# Patient Record
Sex: FEMALE | Race: WHITE | NOT HISPANIC OR LATINO | Employment: OTHER | URBAN - METROPOLITAN AREA
[De-identification: names, ages, dates, MRNs, and addresses within clinical notes are randomized per-mention and may not be internally consistent; named-entity substitution may affect disease eponyms.]

---

## 2017-04-04 ENCOUNTER — OFFICE VISIT (OUTPATIENT)
Dept: FAMILY MEDICINE | Facility: CLINIC | Age: 72
End: 2017-04-04
Payer: COMMERCIAL

## 2017-04-04 ENCOUNTER — RADIANT APPOINTMENT (OUTPATIENT)
Dept: GENERAL RADIOLOGY | Facility: CLINIC | Age: 72
End: 2017-04-04
Attending: PHYSICIAN ASSISTANT
Payer: COMMERCIAL

## 2017-04-04 VITALS
BODY MASS INDEX: 20.36 KG/M2 | SYSTOLIC BLOOD PRESSURE: 98 MMHG | HEART RATE: 54 BPM | DIASTOLIC BLOOD PRESSURE: 66 MMHG | HEIGHT: 66 IN | TEMPERATURE: 97.5 F | WEIGHT: 126.7 LBS | OXYGEN SATURATION: 98 %

## 2017-04-04 DIAGNOSIS — S99.921A INJURY OF TOE, RIGHT, INITIAL ENCOUNTER: Primary | ICD-10-CM

## 2017-04-04 DIAGNOSIS — S99.921A INJURY OF TOE, RIGHT, INITIAL ENCOUNTER: ICD-10-CM

## 2017-04-04 DIAGNOSIS — S92.514A CLOSED NONDISPLACED FRACTURE OF PROXIMAL PHALANX OF LESSER TOE OF RIGHT FOOT, INITIAL ENCOUNTER: ICD-10-CM

## 2017-04-04 PROCEDURE — 99213 OFFICE O/P EST LOW 20 MIN: CPT | Performed by: PHYSICIAN ASSISTANT

## 2017-04-04 PROCEDURE — 73660 X-RAY EXAM OF TOE(S): CPT | Mod: RT

## 2017-04-04 ASSESSMENT — ANXIETY QUESTIONNAIRES
3. WORRYING TOO MUCH ABOUT DIFFERENT THINGS: NOT AT ALL
5. BEING SO RESTLESS THAT IT IS HARD TO SIT STILL: NOT AT ALL
7. FEELING AFRAID AS IF SOMETHING AWFUL MIGHT HAPPEN: NOT AT ALL
GAD7 TOTAL SCORE: 0
2. NOT BEING ABLE TO STOP OR CONTROL WORRYING: NOT AT ALL
6. BECOMING EASILY ANNOYED OR IRRITABLE: NOT AT ALL
IF YOU CHECKED OFF ANY PROBLEMS ON THIS QUESTIONNAIRE, HOW DIFFICULT HAVE THESE PROBLEMS MADE IT FOR YOU TO DO YOUR WORK, TAKE CARE OF THINGS AT HOME, OR GET ALONG WITH OTHER PEOPLE: NOT DIFFICULT AT ALL
1. FEELING NERVOUS, ANXIOUS, OR ON EDGE: NOT AT ALL

## 2017-04-04 ASSESSMENT — PATIENT HEALTH QUESTIONNAIRE - PHQ9: 5. POOR APPETITE OR OVEREATING: NOT AT ALL

## 2017-04-04 NOTE — MR AVS SNAPSHOT
After Visit Summary   4/4/2017    Shaina Calixto    MRN: [id number]           Patient Information     Date Of Birth          1945        Visit Information        Provider Department      4/4/2017 9:00 AM Sanjeev Olvera PA-C [facility]        Today's Diagnoses     Injury of toe, right, initial encounter    -  1    Closed nondisplaced fracture of proximal phalanx of lesser toe of right foot, initial encounter          Care Instructions    You have already gotten [id number] vaccine. You should get the ftlclir31 vaccine. Both a for pneumonia protection.        Follow-ups after your visit        Additional Services     ORTHO  REFERRAL       Coverage of these services is subject to the terms and limitations of your health insurance plan. Please call member services at your health plan with any benefit or coverage questions.       [facility] is referring you to the Orthopedic  Services at [facility].       The  representative will assist you in the coordination of your orthopedic and musculoskeletal care as prescribed by your physician.    The  representative will call you within 24 hours or   You may contact the  representative at:   [phone] for [place] and [facility]  [phone] for [facility], and [facility]  [phone] for [facility]    Type of Referral : [facility]       Timeframe requested: Routine       If X-rays, CT or MRI's   have been performed, please contact the facility where they were done, to arrange for  prior to your scheduled appointment. Please bring this referral request to your appointment and present it to your specialist.                  Your next 10 appointments already scheduled     Apr 18, 2017  1:15 PM CDT   Return Visit with Octavia Marroquin MD   [facility]  "   [facility]  [address]  [facility] [address] [phone]              Who to contact     If you have questions or need follow up information about today's clinic visit or your schedule please contact [facility] [place] directly at [phone].  Normal or non-critical lab and imaging results will be communicated to you by MyChart, letter or phone within 4 business days after the clinic has received the results. If you do not hear from us within 7 days, please contact the clinic through MyChart or phone. If you have a critical or abnormal lab result, we will notify you by phone as soon as possible.  Submit refill requests through [employer] or call your pharmacy and they will forward the refill request to us. Please allow 3 business days for your refill to be completed.          Additional Information About Your Visit        MyChart Information     [employer] gives you secure access to your electronic health record. If you see a primary care provider, you can also send messages to your care team and make appointments. If you have questions, please call your primary care clinic.  If you do not have a primary care provider, please call [phone] and they will assist you.        Care EveryWhere ID     This is your Care EveryWhere ID. This could be used by other organizations to access your [place] medical records  [id number]        Your Vitals Were     Pulse Temperature Height Pulse Oximetry BMI (Body Mass Index)       54 97.5  F (36.4  C) (Oral) 5' 5.5\" (1.664 m) 98% 20.76 kg/m2        Blood Pressure from Last 3 Encounters:   04/04/17 98/66   12/15/15 131/75   11/30/15 130/82    Weight from Last 3 Encounters:   04/04/17 126 lb 11.2 oz (57.5 kg)   12/15/15 136 lb 11.2 oz (62 kg)   11/30/15 138 lb 1.6 oz (62.6 kg)              We Performed the Following     ORTHO  REFERRAL        Primary Care Provider Office Phone # Fax #    JULES Gillette Ra CNP " [phone]       [facility] [address]  [facility] [address]        Thank you!     Thank you for choosing [facility]  for your care. Our goal is always to provide you with excellent care. Hearing back from our patients is one way we can continue to improve our services. Please take a few minutes to complete the written survey that you may receive in the mail after your visit with us. Thank you!             Your Updated Medication List - Protect others around you: Learn how to safely use, store and throw away your medicines at www.disposemymeds.org.          This list is accurate as of: 4/4/17  9:52 AM.  Always use your most recent med list.                   Brand Name Dispense Instructions for use    HOMEOPATHIC PRODUCTS PO          MAGNESIUM PO          OMEGA 3 PO          vitamin B complex with vitamin C Tabs tablet      Take 1 tablet by mouth daily Reported on 4/4/2017       VITAMIN D3 PO      Take by mouth daily       WOMENS BONE HEALTH PO

## 2017-04-04 NOTE — PROGRESS NOTES
SUBJECTIVE:                                                    Shaina Calixto is a 71 year old female who presents to clinic today for the following health issues:    Musculoskeletal problem/pain      Duration: 1 week ago    Description  Location: 4 and 5 toes on right foot    Intensity:  mild    Accompanying signs and symptoms: discoloration of foot, painfull, swelling    History  Previous similar problem: no   Previous evaluation:  none    Precipitating or alleviating factors:  Trauma or overuse: YES- hit chair with foot  Aggravating factors include: standing and walking    Therapies tried and outcome: ice and arnica     -Patient is a 70yo female who presents with a one week hx of right toe injury  -She was walking in the dark and stubbed her toe - chair seemed to go in between her 4th and 5th toes  -There was immediate pain and swelling  -later was able to ice and use arnica for the injury  -she was seen at a hospital in [place], no xrays taken, but did get an ace bandage and continued with her trip  -is walking with limp, favoring the foot    Problem list and histories reviewed & adjusted, as indicated.  Additional history: as documented    Patient Active Problem List   Diagnosis     Breast cancer screening     Mild major depression (H)     Yeast dermatitis     Stress     Urticaria     CARDIOVASCULAR SCREENING; LDL GOAL LESS THAN 160     High risk HPV infection     Past Surgical History:   Procedure Laterality Date     ARTHROSCOPY KNEE RT/LT Right 2004     FOOT SURGERY Right 1995    due to foot injury     FOOT SURGERY Right 2000    ayers's neuroma     LASER YAG CAPSULOTOMY Bilateral 12/29/15     PHACOEMULSIFICATION CLEAR CORNEA WITH STANDARD INTRAOCULAR LENS IMPLANT Right 10/1/14     PHACOEMULSIFICATION CLEAR CORNEA WITH STANDARD INTRAOCULAR LENS IMPLANT Left 10/08/14       Social History   Substance Use Topics     Smoking status: Never Smoker     Smokeless tobacco: Never Used     Alcohol use Yes      Comment:  "1 glass a month     Family History   Problem Relation Age of Onset     Endometrial Cancer Mother 73     only endometrial hyperplasia     Other - See Comments Mother      TIA     Parkinsonism Mother      C.A.D. Maternal Grandfather      CANCER Father      AML     Glaucoma No family hx of      DIABETES No family hx of      Macular Degeneration No family hx of            Reviewed and updated as needed this visit by clinical staff  Tobacco  Allergies  Med Hx  Surg Hx  Fam Hx  Soc Hx      Reviewed and updated as needed this visit by Provider         ROS:  Constitutional, HEENT, cardiovascular, pulmonary, gi and gu systems are negative, except as otherwise noted.    OBJECTIVE:                                                    BP 98/66  Pulse 54  Temp 97.5  F (36.4  C) (Oral)  Ht 5' 5.5\" (1.664 m)  Wt 126 lb 11.2 oz (57.5 kg)  SpO2 98%  BMI 20.76 kg/m2  Body mass index is 20.76 kg/(m^2).  GENERAL: healthy, alert and no distress  MS: mild swelling over the 5th digit of the right pinky toe with ecchymosis over the 4th and 5th toes. Flexion/extension intact. TTP over the mtp of 3,4 & 5. DP intact.     Diagnostic Test Results:  XRAY right foot: fracture of proximal phalanx 5th digit     ASSESSMENT/PLAN:                                                    1. Injury of toe, right, initial encounter  2. Closed nondisplaced fracture of proximal phalanx of lesser toe of right foot, initial encounter  Fracture to 5th digit, right foot noted on exam. She would like to discuss possible treatment/management with podiatry. She is very hands off regarding medical care so swelling will need to be controlled with ice and elevation. Referral placed.   - XR Toe Right G/E 2 Views; Future  - ORTHO  REFERRAL    JENNIFER Bazan-C  [facility]    "

## 2017-04-04 NOTE — NURSING NOTE
"Chief Complaint   Patient presents with     Toe Injury       Initial BP 98/66  Pulse 54  Temp 97.5  F (36.4  C) (Oral)  Ht 5' 5.5\" (1.664 m)  Wt 126 lb 11.2 oz (57.5 kg)  SpO2 98%  BMI 20.76 kg/m2 Estimated body mass index is 20.76 kg/(m^2) as calculated from the following:    Height as of this encounter: 5' 5.5\" (1.664 m).    Weight as of this encounter: 126 lb 11.2 oz (57.5 kg).  Medication Reconciliation: complete   Alexsander Lynne CMA        "

## 2017-04-04 NOTE — PATIENT INSTRUCTIONS
You have already gotten [id number] vaccine. You should get the omeyied44 vaccine. Both a for pneumonia protection.

## 2017-04-05 ASSESSMENT — ANXIETY QUESTIONNAIRES: GAD7 TOTAL SCORE: 0

## 2017-04-05 ASSESSMENT — PATIENT HEALTH QUESTIONNAIRE - PHQ9: SUM OF ALL RESPONSES TO PHQ QUESTIONS 1-9: 0

## 2017-04-18 ENCOUNTER — OFFICE VISIT (OUTPATIENT)
Dept: OPHTHALMOLOGY | Facility: CLINIC | Age: 72
End: 2017-04-18

## 2017-04-18 DIAGNOSIS — H52.13 MYOPIC ASTIGMATISM OF BOTH EYES: ICD-10-CM

## 2017-04-18 DIAGNOSIS — Z96.1 PSEUDOPHAKIA, BOTH EYES: ICD-10-CM

## 2017-04-18 DIAGNOSIS — H52.203 MYOPIC ASTIGMATISM OF BOTH EYES: ICD-10-CM

## 2017-04-18 DIAGNOSIS — H52.4 PRESBYOPIA: ICD-10-CM

## 2017-04-18 DIAGNOSIS — H35.371 EPIRETINAL MEMBRANE, RIGHT EYE: Primary | ICD-10-CM

## 2017-04-18 ASSESSMENT — EXTERNAL EXAM - LEFT EYE: OS_EXAM: NORMAL

## 2017-04-18 ASSESSMENT — VISUAL ACUITY
METHOD: SNELLEN - LINEAR
OS_SC: 20/70-2
OD_SC: 20/20-2 SLOW

## 2017-04-18 ASSESSMENT — CUP TO DISC RATIO
OS_RATIO: 0.2
OD_RATIO: 0.2

## 2017-04-18 ASSESSMENT — EXTERNAL EXAM - RIGHT EYE: OD_EXAM: NORMAL

## 2017-04-18 ASSESSMENT — TONOMETRY
OS_IOP_MMHG: 10
IOP_METHOD: APPLANATION
OD_IOP_MMHG: 10

## 2017-04-18 ASSESSMENT — REFRACTION_MANIFEST
OD_SPHERE: -0.50
OS_SPHERE: -2.50
OS_CYLINDER: +1.50
OS_AXIS: 015
OD_CYLINDER: SPHERE

## 2017-04-18 ASSESSMENT — CONF VISUAL FIELD
OD_NORMAL: 1
OS_NORMAL: 1
METHOD: COUNTING FINGERS

## 2017-04-18 ASSESSMENT — SLIT LAMP EXAM - LIDS
COMMENTS: NORMAL
COMMENTS: NORMAL

## 2017-04-18 NOTE — MR AVS SNAPSHOT
After Visit Summary   4/18/2017    Shaina Calixto    MRN: [id number]           Patient Information     Date Of Birth          1945        Visit Information        Provider Department      4/18/2017 1:15 PM Octavia Marroquin MD [facility]         Follow-ups after your visit        Who to contact     Please call your clinic at [phone] to:    Ask questions about your health    Make or cancel appointments    Discuss your medicines    Learn about your test results    Speak to your doctor   If you have compliments or concerns about an experience at your clinic, or if you wish to file a complaint, please contact [facility] Patient Relations at [phone] or email us at [email or web address]         Additional Information About Your Visit        MyChart Information     [employer] gives you secure access to your electronic health record. If you see a primary care provider, you can also send messages to your care team and make appointments. If you have questions, please call your primary care clinic.  If you do not have a primary care provider, please call [phone] and they will assist you.      [employer] is an electronic gateway that provides easy, online access to your medical records. With [employer], you can request a clinic appointment, read your test results, renew a prescription or communicate with your care team.     To access your existing account, please contact your [facility] or call [phone] for assistance.        Care EveryWhere ID     This is your Care EveryWhere ID. This could be used by other organizations to access your [place] medical records  [id number]         Blood Pressure from Last 3 Encounters:   04/05/17 106/70   04/04/17 98/66   12/15/15 131/75    Weight from Last 3 Encounters:   04/05/17 57.2 kg (126 lb)   04/04/17 57.5 kg (126 lb 11.2 oz)   12/15/15 62 kg (136 lb 11.2 oz)               Today, you had the following     No orders found for display       Primary Care Provider Office Phone # Fax #    JULES Gillette Ra [facility] [phone]       [facility] [address]  [facility] [address]        Thank you!     Thank you for choosing [facility]  for your care. Our goal is always to provide you with excellent care. Hearing back from our patients is one way we can continue to improve our services. Please take a few minutes to complete the written survey that you may receive in the mail after your visit with us. Thank you!             Your Updated Medication List - Protect others around you: Learn how to safely use, store and throw away your medicines at www.disposemymeds.org.          This list is accurate as of: 4/18/17  2:21 PM.  Always use your most recent med list.                   Brand Name Dispense Instructions for use    HOMEOPATHIC PRODUCTS PO          MAGNESIUM PO          OMEGA 3 PO          vitamin B complex with vitamin C Tabs tablet      Take 1 tablet by mouth daily Reported on 4/4/2017       VITAMIN D3 PO      Take by mouth daily       WOMENS BONE HEALTH PO

## 2017-04-18 NOTE — PROGRESS NOTES
HPI  Shaina Calixto is a 71 year old female here for follow-up of ERM right eye. She continues to be unhappy with the result of her cataract surgery by Dr. Arthur about a year ago. She ended up with a monovision result and wanted both eyes for distance. She was also unhappy that the possibility of requiring a YAG capsulotomy was not discussed with her prior to the surgery. She thinks her vision may be getting a bit worse, but nothing drastic. No eye pain, redness, discharge. No flashes/floaters.      Assessment & Plan      (H35.371) Epiretinal membrane, right eye  (primary encounter diagnosis)  Comment: PVD with ERM. ERM stable and area of traction improved. VA good at 20/20.  Plan: Observe for now.     (Z96.1) Pseudophakia, both eyes  Comment: S/p YAG cap OU by Dr. Arthur.   Plan: Observe    (H52.203) Myopic astigmatism of both eyes  (H52.4) Presbyopia  Comment: Good vision with refraction. Monovision.  Plan: Discussed that options include wearing glasses for distance vision or she can consider laser vision correction to left eye. If she opts for laser vision correction, she would still need reading glasses for near. She will consider this, but is not eager to have another procedure.   -----------------------------------------------------------------------------------    Patient disposition:   Return in about 1 year (around 4/18/2018). or sooner as needed.    Teaching statement:  Complete documentation of historical and exam elements from today's encounter can be found in the full encounter summary report (not reduplicated in this progress note). I personally obtained the chief complaint(s) and history of present illness.  I confirmed and edited as necessary the review of systems, past medical/surgical history, family history, social history, and examination findings as documented by others; and I examined the patient myself. I personally reviewed the relevant tests, images, and reports as documented  above.     I formulated and edited as necessary the assessment and plan and discussed the findings and management plan with the patient and family.    Octavia Marroquin MD  Comprehensive Ophthalmology & Ocular Pathology  Department of Ophthalmology and Visual Neurosciences  [email or web address]  Pager [phone]

## 2017-05-15 ENCOUNTER — RADIANT APPOINTMENT (OUTPATIENT)
Dept: GENERAL RADIOLOGY | Facility: CLINIC | Age: 72
End: 2017-05-15
Attending: NURSE PRACTITIONER
Payer: COMMERCIAL

## 2017-05-15 ENCOUNTER — TELEPHONE (OUTPATIENT)
Dept: FAMILY MEDICINE | Facility: CLINIC | Age: 72
End: 2017-05-15

## 2017-05-15 DIAGNOSIS — S92.534D CLOSED NONDISPLACED FRACTURE OF DISTAL PHALANX OF LESSER TOE OF RIGHT FOOT WITH ROUTINE HEALING, SUBSEQUENT ENCOUNTER: ICD-10-CM

## 2017-05-15 DIAGNOSIS — S92.534D CLOSED NONDISPLACED FRACTURE OF DISTAL PHALANX OF LESSER TOE OF RIGHT FOOT WITH ROUTINE HEALING, SUBSEQUENT ENCOUNTER: Primary | ICD-10-CM

## 2017-05-15 PROCEDURE — 73660 X-RAY EXAM OF TOE(S): CPT | Mod: RT

## 2017-05-15 NOTE — TELEPHONE ENCOUNTER
Patient is calling to get a repeat x-ray of her toe?  She was seen by Fredrick 4/4/17, and he told her to stop back  In about 6 weeks to have a repeat x-ray to see how it is   Healing. (not mentioned in note) Is this ok to have this done?    Please advise.  She is doing fine, toe is rj taped to the toe next to it each day.    Livier Carlisle, KAILEE  Triage Nurse

## 2017-05-17 ENCOUNTER — MYC MEDICAL ADVICE (OUTPATIENT)
Dept: FAMILY MEDICINE | Facility: CLINIC | Age: 72
End: 2017-05-17

## 2017-05-18 NOTE — TELEPHONE ENCOUNTER
Sent my chart message to see if she would like to see podiatry.  Livier Carlisle, RN  Triage Nurse

## 2017-05-18 NOTE — TELEPHONE ENCOUNTER
I am checking with Dr. Pastrana.  Dr. Pastrana said she would be happy to see her for eval since not healing as anticipated, but I'm not sure if pt is interested.  Do you want to offer that first?  MEAGAN

## 2017-05-18 NOTE — TELEPHONE ENCOUNTER
Advisable not to tape toe at this point?   Continued fracture noted.  Please advise.   Livier Carlisle, RN  Triage Nurse

## 2017-05-19 ENCOUNTER — TELEPHONE (OUTPATIENT)
Dept: FAMILY MEDICINE | Facility: CLINIC | Age: 72
End: 2017-05-19

## 2017-05-19 NOTE — TELEPHONE ENCOUNTER
Please let pt know I talked with Dr. Pastrana and here is her advice--      NONI Mueller,     That is okay. If she is doing well, I would just have her progress to shoes in 3-4 weeks. If she continues to have a lot of pain after 4-6 weeks, then I would have her follow up to assess bone healing.      Thanks,  MEAGAN

## 2017-06-19 ENCOUNTER — TRANSFERRED RECORDS (OUTPATIENT)
Dept: HEALTH INFORMATION MANAGEMENT | Facility: CLINIC | Age: 72
End: 2017-06-19

## 2017-06-26 ENCOUNTER — OFFICE VISIT (OUTPATIENT)
Dept: FAMILY MEDICINE | Facility: CLINIC | Age: 72
End: 2017-06-26
Payer: COMMERCIAL

## 2017-06-26 VITALS
TEMPERATURE: 98.4 F | WEIGHT: 126 LBS | DIASTOLIC BLOOD PRESSURE: 76 MMHG | OXYGEN SATURATION: 100 % | SYSTOLIC BLOOD PRESSURE: 125 MMHG | HEART RATE: 51 BPM | RESPIRATION RATE: 18 BRPM | BODY MASS INDEX: 20.25 KG/M2 | HEIGHT: 66 IN

## 2017-06-26 DIAGNOSIS — M70.41 PREPATELLAR BURSITIS OF RIGHT KNEE: Primary | ICD-10-CM

## 2017-06-26 DIAGNOSIS — Z13.220 LIPID SCREENING: ICD-10-CM

## 2017-06-26 DIAGNOSIS — F32.0 MILD MAJOR DEPRESSION (H): ICD-10-CM

## 2017-06-26 DIAGNOSIS — S92.501G: ICD-10-CM

## 2017-06-26 PROCEDURE — 99213 OFFICE O/P EST LOW 20 MIN: CPT | Performed by: NURSE PRACTITIONER

## 2017-06-26 RX ORDER — CHOLECALCIFEROL (VITAMIN D3) 50 MCG
CAPSULE ORAL
COMMUNITY
Start: 2017-06-26 | End: 2018-05-22

## 2017-06-26 ASSESSMENT — ANXIETY QUESTIONNAIRES
6. BECOMING EASILY ANNOYED OR IRRITABLE: NOT AT ALL
IF YOU CHECKED OFF ANY PROBLEMS ON THIS QUESTIONNAIRE, HOW DIFFICULT HAVE THESE PROBLEMS MADE IT FOR YOU TO DO YOUR WORK, TAKE CARE OF THINGS AT HOME, OR GET ALONG WITH OTHER PEOPLE: NOT DIFFICULT AT ALL
7. FEELING AFRAID AS IF SOMETHING AWFUL MIGHT HAPPEN: NOT AT ALL
1. FEELING NERVOUS, ANXIOUS, OR ON EDGE: NOT AT ALL
5. BEING SO RESTLESS THAT IT IS HARD TO SIT STILL: NOT AT ALL
3. WORRYING TOO MUCH ABOUT DIFFERENT THINGS: NOT AT ALL
GAD7 TOTAL SCORE: 0
2. NOT BEING ABLE TO STOP OR CONTROL WORRYING: NOT AT ALL

## 2017-06-26 ASSESSMENT — PATIENT HEALTH QUESTIONNAIRE - PHQ9: 5. POOR APPETITE OR OVEREATING: NOT AT ALL

## 2017-06-26 NOTE — PATIENT INSTRUCTIONS
Stop back for a fasting cholesterol screening.    I ordered the xray to be done sometime within the next 3 months as long as you continue to heal.

## 2017-06-26 NOTE — MR AVS SNAPSHOT
After Visit Summary   6/26/2017    Shaina Calixto    MRN: [id number]           Patient Information     Date Of Birth          1945        Visit Information        Provider Department      6/26/2017 9:20 AM Ami Aguilar Ra, APRN CNP [facility]        Today's Diagnoses     Mild major depression (H)    -  1    Lipid screening        Closed fracture of phalanx of lesser toe of right foot with delayed healing, physeal involvement unspecified, unspecified phalanx, subsequent encounter          Care Instructions    Stop back for a fasting cholesterol screening.    I ordered the xray to be done sometime within the next 3 months as long as you continue to heal.          Follow-ups after your visit        Future tests that were ordered for you today     Open Future Orders        Priority Expected Expires Ordered    Lipid panel reflex to direct LDL Routine  6/26/2018 6/26/2017    **Comprehensive metabolic panel FUTURE 1yr Routine 5/27/2018 6/26/2018 6/26/2017    XR Toe Right G/E 2 Views Routine 6/26/2017 6/26/2018 6/26/2017            Who to contact     If you have questions or need follow up information about today's clinic visit or your schedule please contact [facility] directly at [phone].  Normal or non-critical lab and imaging results will be communicated to you by MyChart, letter or phone within 4 business days after the clinic has received the results. If you do not hear from us within 7 days, please contact the clinic through MyChart or phone. If you have a critical or abnormal lab result, we will notify you by phone as soon as possible.  Submit refill requests through [employer] or call your pharmacy and they will forward the refill request to us. Please allow 3 business days for your refill to be completed.          Additional Information About Your Visit        MyChart Information     [employer] gives you secure access to your electronic health record. If you  "see a primary care provider, you can also send messages to your care team and make appointments. If you have questions, please call your primary care clinic.  If you do not have a primary care provider, please call [phone] and they will assist you.        Care EveryWhere ID     This is your Care EveryWhere ID. This could be used by other organizations to access your [place] medical records  [id number]        Your Vitals Were     Pulse Temperature Respirations Height Pulse Oximetry Breastfeeding?    51 98.4  F (36.9  C) (Oral) 18 5' 5.5\" (1.664 m) 100% No    BMI (Body Mass Index)                   20.65 kg/m2            Blood Pressure from Last 3 Encounters:   06/26/17 125/76   04/05/17 106/70   04/04/17 98/66    Weight from Last 3 Encounters:   06/26/17 126 lb (57.2 kg)   04/05/17 126 lb (57.2 kg)   04/04/17 126 lb 11.2 oz (57.5 kg)              We Performed the Following     DEPRESSION ACTION PLAN (DAP)        Primary Care Provider Office Phone # Fax #    Ami JULES Tejeda [facility] [phone]       [facility] [place] [address] [address] [facility] [address]        Equal Access to Services     ARCADIO MANRIQUE : Hadii aad ku hadasho Soomaali, waaxda luqadaha, qaybta kaalmada adeegyada, waxay idiin hayaan adeeg kharaanalia la'aan . So [facility] [phone].    ATENCIÓN: Si habla español, tiene a lee disposición servicios gratuitos de asistencia lingüística. Llame al [phone].    We comply with applicable federal civil rights laws and [place] laws. We do not discriminate on the basis of race, color, national origin, age, disability sex, sexual orientation or gender identity.            Thank you!     Thank you for choosing [facility]  for your care. Our goal is always to provide you with excellent care. Hearing back from our patients is one way we can continue to improve our services. Please take a few minutes to complete the written survey that you may receive in the mail after your " visit with us. Thank you!             Your Updated Medication List - Protect others around you: Learn how to safely use, store and throw away your medicines at www.disposemymeds.org.          This list is accurate as of: 6/26/17 10:46 AM.  Always use your most recent med list.                   Brand Name Dispense Instructions for use Diagnosis    4X PROBIOTIC Tabs           calcium-vitamin D 600-400 MG-UNIT per tablet    CALTRATE     Take 1 tablet by mouth 2 times daily        glutamine 500 MG capsule      Take 1 capsule (500 mg) by mouth 4 times daily        HOMEOPATHIC PRODUCTS PO           MAGNESIUM PO           Molybdenum 30 MCG Caps     30 capsule    Take 45 mcg by mouth        OMEGA 3 PO           UNKNOWN MED DOSAGE      1 tablet Zinc 75        vitamin B complex with vitamin C Tabs tablet      Take 1 tablet by mouth daily Reported on 4/4/2017        VITAMIN D3 PO      Take by mouth daily        VITAMIN K PO      Take 1 tablet by mouth        WOMENS BONE HEALTH PO

## 2017-06-26 NOTE — LETTER
My Depression Action Plan  Name: Shaina Calixto   Date of Birth 1945  Date: 6/26/2017    My doctor: Ami Aguilar Ra   My clinic: [facility]  [id number] [place] [id number] [phone]          GREEN    ZONE   Good Control    What it looks like:     Things are going generally well. You have normal up s and down s. You may even feel depressed from time to time, but bad moods usually last less than a day.   What you need to do:  1. Continue to care for yourself (see self care plan)  2. Check your depression survival kit and update it as needed  3. Follow your physician s recommendations including any medication.  4. Do not stop taking medication unless you consult with your physician first.           YELLOW         ZONE Getting Worse    What it looks like:     Depression is starting to interfere with your life.     It may be hard to get out of bed; you may be starting to isolate yourself from others.    Symptoms of depression are starting to last most all day and this has happened for several days.     You may have suicidal thoughts but they are not constant.   What you need to do:     1. Call your care team, your response to treatment will improve if you keep your care team informed of your progress. Yellow periods are signs an adjustment may need to be made.     2. Continue your self-care, even if you have to fake it!    3. Talk to someone in your support network    4. Open up your depression survival kit           RED    ZONE Medical Alert - Get Help    What it looks like:     Depression is seriously interfering with your life.     You may experience these or other symptoms: You can t get out of bed most days, can t work or engage in other necessary activities, you have trouble taking care of basic hygiene, or basic responsibilities, thoughts of suicide or death that will not go away, self-injurious behavior.     What you need to do:  1. Call your care team  and request a same-day appointment. If they are not available (weekends or after hours) call your local crisis line, emergency room or 911.      Electronically signed by: Kat Stewart, June 26, 2017    Depression Self Care Plan / Survival Kit    Self-Care for Depression  Here s the deal. Your body and mind are really not as separate as most people think.  What you do and think affects how you feel and how you feel influences what you do and think. This means if you do things that people who feel good do, it will help you feel better.  Sometimes this is all it takes.  There is also a place for medication and therapy depending on how severe your depression is, so be sure to consult with your medical provider and/ or Behavioral Health Consultant if your symptoms are worsening or not improving.     In order to better manage my stress, I will:    Exercise  Get some form of exercise, every day. This will help reduce pain and release endorphins, the  feel good  chemicals in your brain. This is almost as good as taking antidepressants!  This is not the same as joining a gym and then never going! (they count on that by the way ) It can be as simple as just going for a walk or doing some gardening, anything that will get you moving.      Hygiene   Maintain good hygiene (Get out of bed in the morning, Make your bed, Brush your teeth, Take a shower, and Get dressed like you were going to work, even if you are unemployed).  If your clothes don't fit try to get ones that do.    Diet  I will strive to eat foods that are good for me, drink plenty of water, and avoid excessive sugar, caffeine, alcohol, and other mood-altering substances.  Some foods that are helpful in depression are: complex carbohydrates, B vitamins, flaxseed, fish or fish oil, fresh fruits and vegetables.    Psychotherapy  I agree to participate in Individual Therapy (if recommended).    Medication  If prescribed medications, I agree to take them.  Missing  doses can result in serious side effects.  I understand that drinking alcohol, or other illicit drug use, may cause potential side effects.  I will not stop my medication abruptly without first discussing it with my provider.    Staying Connected With Others  I will stay in touch with my friends, family members, and my primary care provider/team.    Use your imagination  Be creative.  We all have a creative side; it doesn t matter if it s oil painting, sand castles, or mud pies! This will also kick up the endorphins.    Witness Beauty  (AKA stop and smell the roses) Take a look outside, even in mid-winter. Notice colors, textures. Watch the squirrels and birds.     Service to others  Be of service to others.  There is always someone else in need.  By helping others we can  get out of ourselves  and remember the really important things.  This also provides opportunities for practicing all the other parts of the program.    Humor  Laugh and be silly!  Adjust your TV habits for less news and crime-drama and more comedy.    Control your stress  Try breathing deep, massage therapy, biofeedback, and meditation. Find time to relax each day.     My support system    Clinic Contact:  Phone number:    Contact 1:  Phone number:    Contact 2:  Phone number:    [employer]/:  Phone number:    Therapist:  Phone number:    Local crisis center:    Phone number:    Other community support:  Phone number:

## 2017-06-26 NOTE — PROGRESS NOTES
SUBJECTIVE:                                                    Shaina Calixto is a 71 year old female who presents to clinic today for the following health issues:      Musculoskeletal problem/pain      Duration: x 1 year     Description  Location: right knee    Intensity:  mild    Accompanying signs and symptoms: none    History  Previous similar problem: no   Previous evaluation:  none    Precipitating or alleviating factors:  Trauma or overuse: no   Aggravating factors include: none    Therapies tried and outcome: nothing    Noted a mass on her R knee for the past year, slowly enlarging.  No redness.  She does not remember an injury and does not do anything where she is consistently kneeling.      Follow up on little toe on right foot.  Previous fracture with follow up xray showing continued fracture.  Pt notes she is now seeing improvement.    Problem list and histories reviewed & adjusted, as indicated.  Additional history: as documented    Patient Active Problem List   Diagnosis     Breast cancer screening     Mild major depression (H)     Yeast dermatitis     Stress     Urticaria     CARDIOVASCULAR SCREENING; LDL GOAL LESS THAN 160     High risk HPV infection     Past Surgical History:   Procedure Laterality Date     ARTHROSCOPY KNEE RT/LT Right 2004     FOOT SURGERY Right 1995    due to foot injury     FOOT SURGERY Right 2000    ayers's neuroma     LASER YAG CAPSULOTOMY Bilateral 12/29/15     PHACOEMULSIFICATION CLEAR CORNEA WITH STANDARD INTRAOCULAR LENS IMPLANT Right 10/1/14     PHACOEMULSIFICATION CLEAR CORNEA WITH STANDARD INTRAOCULAR LENS IMPLANT Left 10/08/14       Social History   Substance Use Topics     Smoking status: Never Smoker     Smokeless tobacco: Never Used     Alcohol use Yes      Comment: 1 glass a month     Family History   Problem Relation Age of Onset     Endometrial Cancer Mother 73     only endometrial hyperplasia     Other - See Comments Mother      TIA     Parkinsonism Mother   "    C.A.MICHAEL. Maternal Grandfather      CANCER Father      AML     Glaucoma No family hx of      DIABETES No family hx of      Macular Degeneration No family hx of            Reviewed and updated as needed this visit by clinical staff       Reviewed and updated as needed this visit by Provider         ROS:  SEE HPI.    OBJECTIVE:     /76 (BP Location: Right arm, Patient Position: Chair, Cuff Size: Adult Regular)  Pulse 51  Temp 98.4  F (36.9  C) (Oral)  Resp 18  Ht 5' 5.5\" (1.664 m)  Wt 126 lb (57.2 kg)  SpO2 100%  Breastfeeding? No  BMI 20.65 kg/m2  Body mass index is 20.65 kg/(m^2).  GENERAL: healthy, alert and no distress  MS: R knee palpable mass, soft, mobile.  PSYCH: mentation appears normal, affect normal/bright    Diagnostic Test Results:  none     ASSESSMENT/PLAN:   1. Prepatellar bursitis of right knee  Discussed management.  Monitoring.  Prefers to avoid medication.  Asymptomatic.  Follow up if change or worsening.  Pt agrees with plan and verbalized understanding.    2. Closed fracture of phalanx of lesser toe of right foot with delayed healing, physeal involvement unspecified, unspecified phalanx, subsequent encounter  Improving over time.  Repeat xray in 6-8 weeks.  - XR Toe Right G/E 2 Views; Future    3. Mild major depression (H)      4. Lipid screening  - Lipid panel reflex to direct LDL; Future  - **Comprehensive metabolic panel FUTURE 1yr; Future    JULES Gillette Ra [facility] [place]  "

## 2017-06-26 NOTE — NURSING NOTE
"Chief Complaint   Patient presents with     Musculoskeletal Problem     Consult     SIBO, thermography.       Initial /76 (BP Location: Right arm, Patient Position: Chair, Cuff Size: Adult Regular)  Pulse 51  Temp 98.4  F (36.9  C) (Oral)  Resp 18  Ht 5' 5.5\" (1.664 m)  Wt 126 lb (57.2 kg)  SpO2 100%  Breastfeeding? No  BMI 20.65 kg/m2 Estimated body mass index is 20.65 kg/(m^2) as calculated from the following:    Height as of this encounter: 5' 5.5\" (1.664 m).    Weight as of this encounter: 126 lb (57.2 kg).  Medication Reconciliation: complete   Toby Courtney Student MA  "

## 2017-06-27 DIAGNOSIS — Z13.220 LIPID SCREENING: ICD-10-CM

## 2017-06-27 LAB
ALBUMIN SERPL-MCNC: 3.5 G/DL (ref 3.4–5)
ALP SERPL-CCNC: 93 U/L (ref 40–150)
ALT SERPL W P-5'-P-CCNC: 22 U/L (ref 0–50)
ANION GAP SERPL CALCULATED.3IONS-SCNC: 8 MMOL/L (ref 3–14)
AST SERPL W P-5'-P-CCNC: 15 U/L (ref 0–45)
BILIRUB SERPL-MCNC: 0.3 MG/DL (ref 0.2–1.3)
BUN SERPL-MCNC: 32 MG/DL (ref 7–30)
CALCIUM SERPL-MCNC: 9.2 MG/DL (ref 8.5–10.1)
CHLORIDE SERPL-SCNC: 107 MMOL/L (ref 94–109)
CHOLEST SERPL-MCNC: 183 MG/DL
CO2 SERPL-SCNC: 27 MMOL/L (ref 20–32)
CREAT SERPL-MCNC: 0.78 MG/DL (ref 0.52–1.04)
GFR SERPL CREATININE-BSD FRML MDRD: 73 ML/MIN/1.7M2
GLUCOSE SERPL-MCNC: 79 MG/DL (ref 70–99)
HDLC SERPL-MCNC: 88 MG/DL
LDLC SERPL CALC-MCNC: 86 MG/DL
NONHDLC SERPL-MCNC: 95 MG/DL
POTASSIUM SERPL-SCNC: 4 MMOL/L (ref 3.4–5.3)
PROT SERPL-MCNC: 6.7 G/DL (ref 6.8–8.8)
SODIUM SERPL-SCNC: 142 MMOL/L (ref 133–144)
TRIGL SERPL-MCNC: 46 MG/DL

## 2017-06-27 PROCEDURE — 80053 COMPREHEN METABOLIC PANEL: CPT | Performed by: NURSE PRACTITIONER

## 2017-06-27 PROCEDURE — 80061 LIPID PANEL: CPT | Performed by: NURSE PRACTITIONER

## 2017-06-27 PROCEDURE — 36415 COLL VENOUS BLD VENIPUNCTURE: CPT | Performed by: NURSE PRACTITIONER

## 2017-06-27 ASSESSMENT — ANXIETY QUESTIONNAIRES: GAD7 TOTAL SCORE: 0

## 2017-06-27 ASSESSMENT — PATIENT HEALTH QUESTIONNAIRE - PHQ9: SUM OF ALL RESPONSES TO PHQ QUESTIONS 1-9: 0

## 2017-07-05 ENCOUNTER — MYC MEDICAL ADVICE (OUTPATIENT)
Dept: FAMILY MEDICINE | Facility: CLINIC | Age: 72
End: 2017-07-05

## 2017-07-06 NOTE — TELEPHONE ENCOUNTER
Please see my chart message regarding cholesterol level and risk factor.  Livier Carlisle, RN  Triage Nurse

## 2017-08-07 ENCOUNTER — OFFICE VISIT (OUTPATIENT)
Dept: FAMILY MEDICINE | Facility: CLINIC | Age: 72
End: 2017-08-07
Payer: COMMERCIAL

## 2017-08-07 ENCOUNTER — RADIANT APPOINTMENT (OUTPATIENT)
Dept: GENERAL RADIOLOGY | Facility: CLINIC | Age: 72
End: 2017-08-07
Attending: NURSE PRACTITIONER
Payer: COMMERCIAL

## 2017-08-07 VITALS
DIASTOLIC BLOOD PRESSURE: 60 MMHG | WEIGHT: 124.8 LBS | SYSTOLIC BLOOD PRESSURE: 118 MMHG | TEMPERATURE: 98.2 F | OXYGEN SATURATION: 100 % | HEIGHT: 66 IN | BODY MASS INDEX: 20.06 KG/M2 | HEART RATE: 64 BPM

## 2017-08-07 DIAGNOSIS — S92.514A CLOSED NONDISPLACED FRACTURE OF PROXIMAL PHALANX OF LESSER TOE OF RIGHT FOOT, INITIAL ENCOUNTER: ICD-10-CM

## 2017-08-07 DIAGNOSIS — R42 DIZZINESS: Primary | ICD-10-CM

## 2017-08-07 DIAGNOSIS — K90.9 INTESTINAL MALABSORPTION, UNSPECIFIED TYPE: ICD-10-CM

## 2017-08-07 LAB
BASOPHILS # BLD AUTO: 0.1 10E9/L (ref 0–0.2)
BASOPHILS NFR BLD AUTO: 1.6 %
DIFFERENTIAL METHOD BLD: ABNORMAL
EOSINOPHIL # BLD AUTO: 0.2 10E9/L (ref 0–0.7)
EOSINOPHIL NFR BLD AUTO: 4.7 %
ERYTHROCYTE [DISTWIDTH] IN BLOOD BY AUTOMATED COUNT: 13.5 % (ref 10–15)
HCT VFR BLD AUTO: 35.5 % (ref 35–47)
HGB BLD-MCNC: 11.6 G/DL (ref 11.7–15.7)
LYMPHOCYTES # BLD AUTO: 1 10E9/L (ref 0.8–5.3)
LYMPHOCYTES NFR BLD AUTO: 30.8 %
MCH RBC QN AUTO: 30.3 PG (ref 26.5–33)
MCHC RBC AUTO-ENTMCNC: 32.7 G/DL (ref 31.5–36.5)
MCV RBC AUTO: 93 FL (ref 78–100)
MONOCYTES # BLD AUTO: 0.3 10E9/L (ref 0–1.3)
MONOCYTES NFR BLD AUTO: 9.7 %
NEUTROPHILS # BLD AUTO: 1.7 10E9/L (ref 1.6–8.3)
NEUTROPHILS NFR BLD AUTO: 53.2 %
PLATELET # BLD AUTO: 329 10E9/L (ref 150–450)
RBC # BLD AUTO: 3.83 10E12/L (ref 3.8–5.2)
WBC # BLD AUTO: 3.2 10E9/L (ref 4–11)

## 2017-08-07 PROCEDURE — 85025 COMPLETE CBC W/AUTO DIFF WBC: CPT | Performed by: PHYSICIAN ASSISTANT

## 2017-08-07 PROCEDURE — 83550 IRON BINDING TEST: CPT | Performed by: PHYSICIAN ASSISTANT

## 2017-08-07 PROCEDURE — 73660 X-RAY EXAM OF TOE(S): CPT | Mod: RT

## 2017-08-07 PROCEDURE — 36415 COLL VENOUS BLD VENIPUNCTURE: CPT | Performed by: PHYSICIAN ASSISTANT

## 2017-08-07 PROCEDURE — 83540 ASSAY OF IRON: CPT | Performed by: PHYSICIAN ASSISTANT

## 2017-08-07 PROCEDURE — 82728 ASSAY OF FERRITIN: CPT | Performed by: PHYSICIAN ASSISTANT

## 2017-08-07 PROCEDURE — 99213 OFFICE O/P EST LOW 20 MIN: CPT | Performed by: PHYSICIAN ASSISTANT

## 2017-08-07 NOTE — MR AVS SNAPSHOT
"              After Visit Summary   8/7/2017    Shaina Calixto    MRN: [id number]           Patient Information     Date Of Birth          1945        Visit Information        Provider Department      8/7/2017 9:40 AM Sanjeev Olvera PA-C [facility]        Today's Diagnoses     Dizziness    -  1    Intestinal malabsorption, unspecified type           Follow-ups after your visit        Who to contact     If you have questions or need follow up information about today's clinic visit or your schedule please contact [facility] directly at [phone].  Normal or non-critical lab and imaging results will be communicated to you by Interactive Advisory Softwarehart, letter or phone within 4 business days after the clinic has received the results. If you do not hear from us within 7 days, please contact the clinic through [employer] or phone. If you have a critical or abnormal lab result, we will notify you by phone as soon as possible.  Submit refill requests through Mad Mimi or call your pharmacy and they will forward the refill request to us. Please allow 3 business days for your refill to be completed.          Additional Information About Your Visit        MyChart Information     Mad Mimi gives you secure access to your electronic health record. If you see a primary care provider, you can also send messages to your care team and make appointments. If you have questions, please call your primary care clinic.  If you do not have a primary care provider, please call [phone] and they will assist you.        Care EveryWhere ID     This is your Care EveryWhere ID. This could be used by other organizations to access your [facility] medical records  [id number]        Your Vitals Were     Pulse Temperature Height Pulse Oximetry BMI (Body Mass Index)       64 98.2  F (36.8  C) (Oral) 5' 5.5\" (1.664 m) 100% 20.45 kg/m2        Blood Pressure from Last 3 Encounters:   08/07/17 118/60   06/26/17 125/76   04/05/17 " 106/70    Weight from Last 3 Encounters:   08/07/17 124 lb 12.8 oz (56.6 kg)   06/26/17 126 lb (57.2 kg)   04/05/17 126 lb (57.2 kg)              We Performed the Following     CBC with platelets differential     Ferritin     Iron and iron binding capacity        Primary Care Provider Office Phone # Fax #    JULES Gillette Ra [facility] [phone]       [facility] [address] [facility] [address]        Equal Access to Services     ARCADIO MANRIQUE : Hadii aad ku hadasho Soomaali, waaxda luqadaha, qaybta kaalmada adeegyada, waxay idiin hayaan adeeg khveronica carlson . So [facility] [phone].    ATENCIÓN: Si habla español, tiene a lee disposición servicios gratuitos de asistencia lingüística. Llame al [phone].    We comply with applicable federal civil rights laws and [place] laws. We do not discriminate on the basis of race, color, national origin, age, disability sex, sexual orientation or gender identity.            Thank you!     Thank you for choosing [facility]  for your care. Our goal is always to provide you with excellent care. Hearing back from our patients is one way we can continue to improve our services. Please take a few minutes to complete the written survey that you may receive in the mail after your visit with us. Thank you!             Your Updated Medication List - Protect others around you: Learn how to safely use, store and throw away your medicines at www.disposemymeds.org.          This list is accurate as of: 8/7/17 10:19 AM.  Always use your most recent med list.                   Brand Name Dispense Instructions for use Diagnosis    4X PROBIOTIC Tabs           calcium-vitamin D 600-400 MG-UNIT per tablet    CALTRATE     Take 1 tablet by mouth 2 times daily        glutamine 500 MG capsule      Take 1 capsule (500 mg) by mouth 4 times daily        MAGNESIUM PO           Molybdenum 30 MCG Caps     30 capsule    Take 45 mcg by mouth        OMEGA 3 PO            UNKNOWN MED DOSAGE      1 tablet Zinc 75        VITAMIN D3 PO      Take by mouth daily        VITAMIN K PO      Take 1 tablet by mouth        WOM[employer] [facility] HEALTH PO

## 2017-08-07 NOTE — NURSING NOTE
"Chief Complaint   Patient presents with     Dizziness       Initial /60 (BP Location: Right arm, Cuff Size: Adult Regular)  Pulse 64  Temp 98.2  F (36.8  C) (Oral)  Ht 5' 5.5\" (1.664 m)  Wt 124 lb 12.8 oz (56.6 kg)  SpO2 100%  BMI 20.45 kg/m2 Estimated body mass index is 20.45 kg/(m^2) as calculated from the following:    Height as of this encounter: 5' 5.5\" (1.664 m).    Weight as of this encounter: 124 lb 12.8 oz (56.6 kg).  Medication Reconciliation: complete   Alexsander Lynne CMA      "
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
## 2017-08-08 LAB
FERRITIN SERPL-MCNC: 7 NG/ML (ref 8–252)
IRON SATN MFR SERPL: 7 % (ref 15–46)
IRON SERPL-MCNC: 33 UG/DL (ref 35–180)
TIBC SERPL-MCNC: 443 UG/DL (ref 240–430)

## 2017-08-30 ENCOUNTER — TELEPHONE (OUTPATIENT)
Dept: FAMILY MEDICINE | Facility: CLINIC | Age: 72
End: 2017-08-30

## 2017-08-30 DIAGNOSIS — Z12.11 SPECIAL SCREENING FOR MALIGNANT NEOPLASMS, COLON: ICD-10-CM

## 2017-08-30 DIAGNOSIS — D50.9 ANEMIA, IRON DEFICIENCY: Primary | ICD-10-CM

## 2017-08-30 NOTE — TELEPHONE ENCOUNTER
Patient is calling to ask for a FIT test to be ordered so she can check the  Possible cause of the iron deficiency. (seen by Fredrick recently)  Her other doctor suggested that she  Go off of the iron for 4 days, and then do this test and send it in.  Please order if ok. She will come and  the kit at lab.    Livier Carlisle, KAILEE  Triage Nurse

## 2017-08-31 PROCEDURE — G0328 FECAL BLOOD SCRN IMMUNOASSAY: HCPCS | Performed by: NURSE PRACTITIONER

## 2017-09-01 DIAGNOSIS — Z12.11 SPECIAL SCREENING FOR MALIGNANT NEOPLASMS, COLON: ICD-10-CM

## 2017-09-03 LAB — HEMOCCULT STL QL IA: NEGATIVE

## 2017-09-15 ENCOUNTER — DOCUMENTATION ONLY (OUTPATIENT)
Dept: FAMILY MEDICINE | Facility: CLINIC | Age: 72
End: 2017-09-15

## 2017-09-15 NOTE — PROGRESS NOTES
Panel Management Review      Patient has the following on her problem list: None      Composite cancer screening  Chart review shows that this patient is due/due soon for the following Mammogram  Summary:    Patient is due/failing the following:   MAMMOGRAM    Action needed:   Patient needs office visit for Mammo.    Type of outreach:    Sent GOWEX message.    Questions for provider review:    None                                                                                                                                    Alexsander Lynne [facility]       Chart routed to Care Team .

## 2017-11-13 DIAGNOSIS — D50.9 IRON DEFICIENCY ANEMIA, UNSPECIFIED IRON DEFICIENCY ANEMIA TYPE: Primary | ICD-10-CM

## 2017-11-13 DIAGNOSIS — D50.9 IRON DEFICIENCY ANEMIA, UNSPECIFIED IRON DEFICIENCY ANEMIA TYPE: ICD-10-CM

## 2017-11-13 LAB
ERYTHROCYTE [DISTWIDTH] IN BLOOD BY AUTOMATED COUNT: 14.5 % (ref 10–15)
HCT VFR BLD AUTO: 42.2 % (ref 35–47)
HGB BLD-MCNC: 14.2 G/DL (ref 11.7–15.7)
MCH RBC QN AUTO: 31.8 PG (ref 26.5–33)
MCHC RBC AUTO-ENTMCNC: 33.6 G/DL (ref 31.5–36.5)
MCV RBC AUTO: 95 FL (ref 78–100)
PLATELET # BLD AUTO: 213 10E9/L (ref 150–450)
RBC # BLD AUTO: 4.46 10E12/L (ref 3.8–5.2)
WBC # BLD AUTO: 3.9 10E9/L (ref 4–11)

## 2017-11-13 PROCEDURE — 83540 ASSAY OF IRON: CPT | Performed by: NURSE PRACTITIONER

## 2017-11-13 PROCEDURE — 85027 COMPLETE CBC AUTOMATED: CPT | Performed by: NURSE PRACTITIONER

## 2017-11-13 PROCEDURE — 82728 ASSAY OF FERRITIN: CPT | Performed by: NURSE PRACTITIONER

## 2017-11-13 PROCEDURE — 83550 IRON BINDING TEST: CPT | Performed by: NURSE PRACTITIONER

## 2017-11-13 PROCEDURE — 36415 COLL VENOUS BLD VENIPUNCTURE: CPT | Performed by: NURSE PRACTITIONER

## 2017-11-14 LAB — FERRITIN SERPL-MCNC: 26 NG/ML (ref 8–252)

## 2017-11-15 ENCOUNTER — MYC MEDICAL ADVICE (OUTPATIENT)
Dept: FAMILY MEDICINE | Facility: CLINIC | Age: 72
End: 2017-11-15

## 2017-11-15 DIAGNOSIS — D50.9 IRON DEFICIENCY ANEMIA, UNSPECIFIED IRON DEFICIENCY ANEMIA TYPE: Primary | ICD-10-CM

## 2017-11-15 NOTE — TELEPHONE ENCOUNTER
PCP please see Mychart message at length.     Looks like patient wanted to have another iron and iron binding cap lab drawn instead of a CBC.    She also is wondering if she should continue her Fe supplement and if so at what dose.     Diana SCHAFFER RN, BSN, PHN  [facility] RN

## 2017-11-16 LAB
IRON SATN MFR SERPL: 39 % (ref 15–46)
IRON SERPL-MCNC: 126 UG/DL (ref 35–180)
TIBC SERPL-MCNC: 325 UG/DL (ref 240–430)

## 2018-01-12 ENCOUNTER — TELEPHONE (OUTPATIENT)
Dept: FAMILY MEDICINE | Facility: CLINIC | Age: 73
End: 2018-01-12

## 2018-01-12 ENCOUNTER — OFFICE VISIT (OUTPATIENT)
Dept: FAMILY MEDICINE | Facility: CLINIC | Age: 73
End: 2018-01-12
Payer: COMMERCIAL

## 2018-01-12 VITALS
OXYGEN SATURATION: 99 % | WEIGHT: 126.8 LBS | TEMPERATURE: 97.8 F | DIASTOLIC BLOOD PRESSURE: 70 MMHG | SYSTOLIC BLOOD PRESSURE: 120 MMHG | HEART RATE: 68 BPM | BODY MASS INDEX: 20.38 KG/M2 | HEIGHT: 66 IN

## 2018-01-12 DIAGNOSIS — R09.89 BRUIT OF RIGHT CAROTID ARTERY: ICD-10-CM

## 2018-01-12 DIAGNOSIS — F43.0 ACUTE REACTION TO STRESS: Primary | ICD-10-CM

## 2018-01-12 PROCEDURE — 99214 OFFICE O/P EST MOD 30 MIN: CPT | Performed by: NURSE PRACTITIONER

## 2018-01-12 NOTE — TELEPHONE ENCOUNTER
Huddled with MEAGAN.    Patient asymptomatic at this time. Patient states feels it is more anxiety. Has read up on symptoms of TIA and does not think that caused her symptoms. Would be more anxious going to the ER and prefers to see MEAGAN.    MEAGAN informed of patient condition.    Will keep appt.    Shaina Umanzor RN

## 2018-01-12 NOTE — TELEPHONE ENCOUNTER
Patient calling to report that she feels she had a mini stroke.  Noted this day before yesterday, she was having trouble with   Her thoughts that seemed delayed, and she could not focus. This  Lasted about 6 hours, it would come to her eventually and she was  Able to function. Felt it may have been caused by some anxiety.  Had been having a difficult phone conversation at that time, and it seemed  To trigger this.   She is doing fine now, just has been looking up   Information and some of the symptoms, sounded somewhat like a TIA.   Some increase in word finding lately, so not sure if this could be an age  Related issue. No slurred speech, no LOC, no deficits. Normal sensation.  Ok to keep appointment for today?  If not she is fine with whatever you advise.  Livier Carlisle, RN  Triage Nurse

## 2018-01-12 NOTE — PROGRESS NOTES
"  SUBJECTIVE:   Shaina Calixto is a 72 year old female who presents to clinic today for the following health issues:      Neurologic problem      Duration: 3 days ago lasting 5-6 hours    Description (location/character/radiation): Felt like her thoughts were a \"Football field\" away per patient. Unable to remember and gather her thoughts.    Intensity:  Moderate- Under a lot of stress- anxiety?    Accompanying signs and symptoms: None    History (similar episodes/previous evaluation): None    Precipitating or alleviating factors: None    Therapies tried and outcome: None       Pt presents with concerns regarding recent events.  Has had a lot of stressors over the past 1 month.  Had just finished a stressful conversation and felt her thoughts became distant.  She felt it was difficult to focus but had no difficulty with speech, and seemed to continue comprehend everything without problem.  She relaxed for the remainder of the day and slowly felt improved.  She feels that this was stress induced.  She had no chest pain, palpitations, sob.  She has been eating and drinking well, working with specialist to treat SIBO.  Will be changing diet drastically in the future.  She does not feel that mood is a frequent issue but over the past few months there have been stressors.  Feels she needed to listen to her body and slow down.    Problem list and histories reviewed & adjusted, as indicated.  Additional history: as documented    Patient Active Problem List   Diagnosis     Breast cancer screening     Mild major depression (H)     Yeast dermatitis     Stress     Urticaria     CARDIOVASCULAR SCREENING; LDL GOAL LESS THAN 160     High risk HPV infection     Past Surgical History:   Procedure Laterality Date     ARTHROSCOPY KNEE RT/LT Right 2004     FOOT SURGERY Right 1995    due to foot injury     FOOT SURGERY Right 2000    ayers's neuroma     LASER YAG CAPSULOTOMY Bilateral 12/29/15     PHACOEMULSIFICATION CLEAR CORNEA WITH " "STANDARD INTRAOCULAR LENS IMPLANT Right 10/1/14     PHACOEMULSIFICATION CLEAR CORNEA WITH STANDARD INTRAOCULAR LENS IMPLANT Left 10/08/14       Social History   Substance Use Topics     Smoking status: Never Smoker     Smokeless tobacco: Never Used     Alcohol use Yes      Comment: 1 glass a month     Family History   Problem Relation Age of Onset     Endometrial Cancer Mother 73     only endometrial hyperplasia     Other - See Comments Mother      TIA     Parkinsonism Mother      C.A.D. Maternal Grandfather      CANCER Father      AML     Glaucoma No family hx of      DIABETES No family hx of      Macular Degeneration No family hx of            Reviewed and updated as needed this visit by clinical staff     Reviewed and updated as needed this visit by Provider         ROS:  SEE HPI.    OBJECTIVE:     /70  Pulse 68  Temp 97.8  F (36.6  C) (Oral)  Ht 5' 5.5\" (1.664 m)  Wt 126 lb 12.8 oz (57.5 kg)  SpO2 99%  BMI 20.78 kg/m2  Body mass index is 20.78 kg/(m^2).  GENERAL: healthy, alert and no distress  EYES: Eyes grossly normal to inspection, PERRL and conjunctivae and sclerae normal  HENT: ear canals and TM's normal, nose and mouth without ulcers or lesions  NECK: no adenopathy, no asymmetry, masses, or scars and carotid bruit noted:  right  RESP: lungs clear to auscultation - no rales, rhonchi or wheezes  CV: regular rate and rhythm, normal S1 S2, no S3 or S4, no murmur, click or rub, no peripheral edema and peripheral pulses strong  NEURO: Normal strength and tone, sensory exam grossly normal, mentation intact and cranial nerves 2-12 intact  PSYCH: mentation appears normal, affect normal/bright    Diagnostic Test Results:  none     ASSESSMENT/PLAN:   1. Acute reaction to stress  72 y.o. Female in very good health.  Here today with concerns regarding recent events.  After discussing in depth it does appear very likely that the recent episode was due to a sudden increase in stress.  She will monitor very " closely for recurrence and work on additional self care modalities at home.  If any recurrence she will follow up.    2. Bruit of right carotid artery  Incidentally noted on today's exam.  Discussed options.  Check US.  Pt agrees with plan and verbalized understanding.  - US Carotid Bilateral; Future    JULES Gillette Ra [facility] [place]

## 2018-01-12 NOTE — MR AVS SNAPSHOT
After Visit Summary   1/12/2018    Shaina Calixto    MRN: [id number]           Patient Information     Date Of Birth          1945        Visit Information        Provider Department      1/12/2018 2:20 PM Ami Aguilar Ra, APRN CNP [facility]        Today's Diagnoses     Bruit of right carotid artery    -  1       Follow-ups after your visit        Future tests that were ordered for you today     Open Future Orders        Priority Expected Expires Ordered    US Carotid Bilateral Routine  1/12/2019 1/12/2018            Who to contact     If you have questions or need follow up information about today's clinic visit or your schedule please contact [facility] directly at [phone].  Normal or non-critical lab and imaging results will be communicated to you by MyChart, letter or phone within 4 business days after the clinic has received the results. If you do not hear from us within 7 days, please contact the clinic through FLENShart or phone. If you have a critical or abnormal lab result, we will notify you by phone as soon as possible.  Submit refill requests through Jobaline or call your pharmacy and they will forward the refill request to us. Please allow 3 business days for your refill to be completed.          Additional Information About Your Visit        MyChart Information     Jobaline gives you secure access to your electronic health record. If you see a primary care provider, you can also send messages to your care team and make appointments. If you have questions, please call your primary care clinic.  If you do not have a primary care provider, please call [phone] and they will assist you.        Care EveryWhere ID     This is your Care EveryWhere ID. This could be used by other organizations to access your [place] medical records  [id number]        Your Vitals Were     Pulse Temperature Height Pulse Oximetry BMI (Body Mass Index)       68 97.8  " F (36.6  C) (Oral) 5' 5.5\" (1.664 m) 99% 20.78 kg/m2        Blood Pressure from Last 3 Encounters:   01/12/18 120/70   08/07/17 118/60   06/26/17 125/76    Weight from Last 3 Encounters:   01/12/18 126 lb 12.8 oz (57.5 kg)   08/07/17 124 lb 12.8 oz (56.6 kg)   06/26/17 126 lb (57.2 kg)               Primary Care Provider Office Phone # Fax #    Ami Aguilar, JULES [facility] [phone]       [address] [facility] [address]        Equal Access to Services     SCARLETT MANRIQUE : Hadii juarez li hadasho Soomaali, waaxda luqadaha, qaybta kaalmada adeegyada, mackenzie carlson . So [facility] [phone].    ATENCIÓN: Si habla español, tiene a lee disposición servicios gratuitos de asistencia lingüística. Llame al [phone].    We comply with applicable federal civil rights laws and [place] laws. We do not discriminate on the basis of race, color, national origin, age, disability, sex, sexual orientation, or gender identity.            Thank you!     Thank you for choosing [facility]  for your care. Our goal is always to provide you with excellent care. Hearing back from our patients is one way we can continue to improve our services. Please take a few minutes to complete the written survey that you may receive in the mail after your visit with us. Thank you!             Your Updated Medication List - Protect others around you: Learn how to safely use, store and throw away your medicines at www.disposemymeds.org.          This list is accurate as of: 1/12/18  3:11 PM.  Always use your most recent med list.                   Brand Name Dispense Instructions for use Diagnosis    4X PROBIOTIC Tabs           calcium-vitamin D 600-400 MG-UNIT per tablet    CALTRATE     Take 1 tablet by mouth 2 times daily        glutamine 500 MG capsule      Take 1 capsule (500 mg) by mouth 4 times daily        MAGNESIUM PO           Molybdenum 30 MCG Caps     30 capsule    Take 45 mcg by " mouth        OMEGA 3 PO           UNKNOWN MED DOSAGE      1 tablet Zinc 75        VITAMIN D3 PO      Take by mouth daily        VITAMIN K PO      Take 1 tablet by mouth        WOMENS BONE HEALTH PO

## 2018-01-12 NOTE — NURSING NOTE
"Chief Complaint   Patient presents with     Neurologic Problem       Initial /70  Pulse 68  Temp 97.8  F (36.6  C) (Oral)  Ht 5' 5.5\" (1.664 m)  Wt 126 lb 12.8 oz (57.5 kg)  SpO2 99%  BMI 20.78 kg/m2 Estimated body mass index is 20.78 kg/(m^2) as calculated from the following:    Height as of this encounter: 5' 5.5\" (1.664 m).    Weight as of this encounter: 126 lb 12.8 oz (57.5 kg).  Medication Reconciliation: complete   Suma RODRIGUEZ M.A.      "

## 2018-01-24 ENCOUNTER — TELEPHONE (OUTPATIENT)
Dept: FAMILY MEDICINE | Facility: CLINIC | Age: 73
End: 2018-01-24

## 2018-01-25 ENCOUNTER — HOSPITAL ENCOUNTER (OUTPATIENT)
Dept: ULTRASOUND IMAGING | Facility: CLINIC | Age: 73
Discharge: HOME | End: 2018-01-25
Attending: NURSE PRACTITIONER | Admitting: NURSE PRACTITIONER
Payer: MEDICARE

## 2018-01-25 DIAGNOSIS — R09.89 BRUIT OF RIGHT CAROTID ARTERY: ICD-10-CM

## 2018-01-25 PROCEDURE — 93880 EXTRACRANIAL BILAT STUDY: CPT

## 2018-01-29 ENCOUNTER — MYC MEDICAL ADVICE (OUTPATIENT)
Dept: FAMILY MEDICINE | Facility: CLINIC | Age: 73
End: 2018-01-29

## 2018-02-05 NOTE — PATIENT INSTRUCTIONS
We will talk more about the ultrasound.  Monitor everything very closely.  If any recurrence please let me know or seek care.

## 2018-02-26 ENCOUNTER — TELEPHONE (OUTPATIENT)
Dept: FAMILY MEDICINE | Facility: CLINIC | Age: 73
End: 2018-02-26

## 2018-02-26 NOTE — TELEPHONE ENCOUNTER
Panel Management Review      Patient has the following on her problem list: None      Composite cancer screening  Chart review shows that this patient is due/due soon for the following Mammogram  Summary:    Patient is due/failing the following:   MAMMOGRAM    Action needed:   Patient needs office visit for mammogram.    Type of outreach:    Phone, spoke to patient.  She said she goes somewhere else for mammograms.    Questions for provider review:    None                                                                                                                                    Octavia Howell (RT)R       Chart routed to  .

## 2018-05-08 ENCOUNTER — MYC MEDICAL ADVICE (OUTPATIENT)
Dept: FAMILY MEDICINE | Facility: CLINIC | Age: 73
End: 2018-05-08

## 2018-05-09 NOTE — TELEPHONE ENCOUNTER
Routing to lab to help look up cost before ordering.  Please let me know so I can let the pt know.  MEAGAN

## 2018-05-09 NOTE — TELEPHONE ENCOUNTER
Please see patient request for lab. If you would order, would you want an e-visit for this?    Shaina Umanzor RN

## 2018-05-22 ENCOUNTER — OFFICE VISIT (OUTPATIENT)
Dept: OPHTHALMOLOGY | Facility: CLINIC | Age: 73
End: 2018-05-22
Payer: COMMERCIAL

## 2018-05-22 DIAGNOSIS — H52.13 MYOPIC ASTIGMATISM OF BOTH EYES: ICD-10-CM

## 2018-05-22 DIAGNOSIS — H52.203 MYOPIC ASTIGMATISM OF BOTH EYES: ICD-10-CM

## 2018-05-22 DIAGNOSIS — Z96.1 PSEUDOPHAKIA, BOTH EYES: ICD-10-CM

## 2018-05-22 DIAGNOSIS — H35.371 EPIRETINAL MEMBRANE, RIGHT EYE: Primary | ICD-10-CM

## 2018-05-22 DIAGNOSIS — H52.4 PRESBYOPIA: ICD-10-CM

## 2018-05-22 ASSESSMENT — VISUAL ACUITY
METHOD: SNELLEN - LINEAR
OS_SC: J1+
METHOD_MR: DECLINES
OS_SC: 20/80-/+
OD_SC: 20/20-2

## 2018-05-22 ASSESSMENT — CUP TO DISC RATIO
OD_RATIO: 0.2
OS_RATIO: 0.2

## 2018-05-22 ASSESSMENT — TONOMETRY
OD_IOP_MMHG: 15
OS_IOP_MMHG: 11
IOP_METHOD: ICARE

## 2018-05-22 ASSESSMENT — EXTERNAL EXAM - LEFT EYE: OS_EXAM: NORMAL

## 2018-05-22 ASSESSMENT — SLIT LAMP EXAM - LIDS
COMMENTS: NORMAL
COMMENTS: NORMAL

## 2018-05-22 ASSESSMENT — CONF VISUAL FIELD
OS_NORMAL: 1
OD_NORMAL: 1

## 2018-05-22 ASSESSMENT — EXTERNAL EXAM - RIGHT EYE: OD_EXAM: NORMAL

## 2018-05-22 NOTE — NURSING NOTE
"Chief Complaints and History of Present Illnesses   Patient presents with     Follow Up For     1 year f/u epiretinal membrane of right eye and pseudophakia     HPI    Affected eye(s):  Both   Symptoms:     Difficulty with reading (Comment: reading is not quite as clear as it was right after she had her surgery, she \"tries not to\" wear reading glasses)      Duration:  1 year   Frequency:  Constant       Do you have eye pain now?:  No      Comments:  YONATAN Srivastava 1:56 PM 05/22/2018               "

## 2018-05-22 NOTE — MR AVS SNAPSHOT
After Visit Summary   5/22/2018    Shaina Calixto    MRN: [id number]           Patient Information     Date Of Birth          1945        Visit Information        Provider Department      5/22/2018 1:45 PM Octavia Marroquin MD [facility]        Today's Diagnoses     Epiretinal membrane, right eye    -  1    Pseudophakia, both eyes        Myopic astigmatism of both eyes        Presbyopia           Follow-ups after your visit        Follow-up notes from your care team     Return in about 1 year (around 5/22/2019).      Who to contact     Please call your clinic at [phone] to:    Ask questions about your health    Make or cancel appointments    Discuss your medicines    Learn about your test results    Speak to your doctor            Additional Information About Your Visit        MyChart Information     Jildy gives you secure access to your electronic health record. If you see a primary care provider, you can also send messages to your care team and make appointments. If you have questions, please call your primary care clinic.  If you do not have a primary care provider, please call [phone] and they will assist you.      Jildy is an electronic gateway that provides easy, online access to your medical records. With Jildy, you can request a clinic appointment, read your test results, renew a prescription or communicate with your care team.     To access your existing account, please contact your [facility] or call [phone] for assistance.        Care EveryWhere ID     This is your Care EveryWhere ID. This could be used by other organizations to access your [place] medical records  [id number]         Blood Pressure from Last 3 Encounters:   01/12/18 120/70   08/07/17 118/60   06/26/17 125/76    Weight from Last 3 Encounters:   01/12/18 57.5 kg (126 lb 12.8 oz)   08/07/17 56.6 kg (124 lb 12.8 oz)   06/26/17 57.2 kg (126  lb)              Today, you had the following     No orders found for display         Today's Medication Changes          These changes are accurate as of 5/22/18  2:36 PM.  If you have any questions, ask your nurse or doctor.               Stop taking these medicines if you haven't already. Please contact your care team if you have questions.     4X PROBIOTIC Tabs   Stopped by:  Octavia Marroquin MD           Molybdenum 30 MCG Caps   Stopped by:  Octavia Marroquin MD                    Primary Care Provider Office Phone # Fax #    JULES Gillette Ra [facility] [phone] [address]  [facility] [address]        Equal Access to Services     [facility]: Hadii aad ku hadasho Soomaali, waaxda luqadaha, qaybta kaalmada adeegyada, waxbritt carlson . So [facility] [phone].    ATENCIÓN: Si habla español, tiene a lee disposición servicios gratuitos de asistencia lingüística. Llame al [phone].    We comply with applicable federal civil rights laws and [place] laws. We do not discriminate on the basis of race, color, national origin, age, disability, sex, sexual orientation, or gender identity.            Thank you!     Thank you for choosing [facility] [place] [facility]  for your care. Our goal is always to provide you with excellent care. Hearing back from our patients is one way we can continue to improve our services. Please take a few minutes to complete the written survey that you may receive in the mail after your visit with us. Thank you!             Your Updated Medication List - Protect others around you: Learn how to safely use, store and throw away your medicines at www.disposemymeds.org.          This list is accurate as of 5/22/18  2:36 PM.  Always use your most recent med list.                   Brand Name Dispense Instructions for use Diagnosis    calcium-vitamin D 600-400 MG-UNIT per tablet    CALTRATE     Take 1 tablet by mouth 2 times daily         glutamine 500 MG capsule      Take 1 capsule (500 mg) by mouth 4 times daily        IRON (FERROUS GLUCONATE) PO      Floravital type        MAGNESIUM PO           OMEGA 3 PO           UNKNOWN MED DOSAGE      1 tablet Zinc 75        VITAMIN D3 PO      Take by mouth daily        VITAMIN K PO      Take 1 tablet by mouth        WOMENS BONE HEALTH PO

## 2018-05-22 NOTE — PROGRESS NOTES
HPI  Shaina Calixto is a 72 year old female here for follow-up of ERM right eye.     She had cataract surgery with Dr. Arthur in 2016. She ended up with a monovision result and wanted both eyes for distance.     She is having a little more trouble with reading compared to last year. No eye pain, redness, discharge. No flashes/floaters.      Assessment & Plan      (H35.371) Epiretinal membrane, right eye  (primary encounter diagnosis)  Comment: PVD with ERM. ERM stable and area of traction improved. VA good at 20/20.  Plan: Observe for now.     (Z96.1) Pseudophakia, both eyes  Comment: S/p YAG cap OU by Dr. Arthur.   Plan: Observe    (H52.203) Myopic astigmatism of both eyes  (H52.4) Presbyopia  Comment: Good vision with refraction. Monovision.  Plan: Discussed that options include wearing glasses for distance vision or she can consider laser vision correction to left eye. If she opts for laser vision correction, she would still need reading glasses for near. She will consider this, but is not eager to have another procedure.   -----------------------------------------------------------------------------------    Patient disposition:   Return in about 1 year (around 5/22/2019). or sooner as needed.    Teaching statement:  Complete documentation of historical and exam elements from today's encounter can be found in the full encounter summary report (not reduplicated in this progress note). I personally obtained the chief complaint(s) and history of present illness.  I confirmed and edited as necessary the review of systems, past medical/surgical history, family history, social history, and examination findings as documented by others; and I examined the patient myself. I personally reviewed the relevant tests, images, and reports as documented above.     I formulated and edited as necessary the assessment and plan and discussed the findings and management plan with the patient and family.    Octavia GUADALUPE  MD Cara  Comprehensive Ophthalmology & Ocular Pathology  Department of Ophthalmology and Visual Neurosciences  [email or web address]  Pager [phone]

## 2018-05-31 ENCOUNTER — TELEPHONE (OUTPATIENT)
Dept: FAMILY MEDICINE | Facility: CLINIC | Age: 73
End: 2018-05-31

## 2018-05-31 NOTE — TELEPHONE ENCOUNTER
Patient called. 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 Iron deficiency is a concern. In August her Iron saturation index was 7%, she began taking OTC iron which raised it up to 30%. But after getting it up and the inconvenience of traveling and having to refrigerate supplement, she discontinued it a number of months ago. Now has fatigue again. She has started taking a supplement again for the last month but at half the dosage she was taking before, a maintenance dose once a day.     Patient is requesting an order for her ferritin to be checked again in the lab.     Huddled with Ami and she advised an e-visit. Patient not comfortable with paying the fee for an e-visit (Medicare probably doesn't cover it and she doesn't want to spend 3 hours on the phone to find out), so patient requested a clinic visit with Ami.    Appointment scheduled for Monday, June 4th.    Coral JUAREZ Triage RN

## 2018-06-04 ENCOUNTER — RADIANT APPOINTMENT (OUTPATIENT)
Dept: GENERAL RADIOLOGY | Facility: CLINIC | Age: 73
End: 2018-06-04
Attending: NURSE PRACTITIONER
Payer: COMMERCIAL

## 2018-06-04 ENCOUNTER — OFFICE VISIT (OUTPATIENT)
Dept: FAMILY MEDICINE | Facility: CLINIC | Age: 73
End: 2018-06-04
Payer: COMMERCIAL

## 2018-06-04 ENCOUNTER — TELEPHONE (OUTPATIENT)
Dept: FAMILY MEDICINE | Facility: CLINIC | Age: 73
End: 2018-06-04

## 2018-06-04 VITALS
RESPIRATION RATE: 14 BRPM | HEART RATE: 57 BPM | SYSTOLIC BLOOD PRESSURE: 126 MMHG | OXYGEN SATURATION: 96 % | TEMPERATURE: 97.6 F | DIASTOLIC BLOOD PRESSURE: 70 MMHG | BODY MASS INDEX: 20.88 KG/M2 | WEIGHT: 127.4 LBS

## 2018-06-04 DIAGNOSIS — R05.9 COUGH: ICD-10-CM

## 2018-06-04 DIAGNOSIS — R21 RASH: ICD-10-CM

## 2018-06-04 DIAGNOSIS — Z86.2 HISTORY OF ANEMIA: Primary | ICD-10-CM

## 2018-06-04 LAB
ERYTHROCYTE [DISTWIDTH] IN BLOOD BY AUTOMATED COUNT: 12.3 % (ref 10–15)
HCT VFR BLD AUTO: 40.2 % (ref 35–47)
HGB BLD-MCNC: 13.7 G/DL (ref 11.7–15.7)
MCH RBC QN AUTO: 33.5 PG (ref 26.5–33)
MCHC RBC AUTO-ENTMCNC: 34.1 G/DL (ref 31.5–36.5)
MCV RBC AUTO: 98 FL (ref 78–100)
PLATELET # BLD AUTO: 234 10E9/L (ref 150–450)
RBC # BLD AUTO: 4.09 10E12/L (ref 3.8–5.2)
WBC # BLD AUTO: 4.1 10E9/L (ref 4–11)

## 2018-06-04 PROCEDURE — 83550 IRON BINDING TEST: CPT | Performed by: NURSE PRACTITIONER

## 2018-06-04 PROCEDURE — 99213 OFFICE O/P EST LOW 20 MIN: CPT | Performed by: NURSE PRACTITIONER

## 2018-06-04 PROCEDURE — 85027 COMPLETE CBC AUTOMATED: CPT | Performed by: NURSE PRACTITIONER

## 2018-06-04 PROCEDURE — 83540 ASSAY OF IRON: CPT | Performed by: NURSE PRACTITIONER

## 2018-06-04 PROCEDURE — 36415 COLL VENOUS BLD VENIPUNCTURE: CPT | Performed by: NURSE PRACTITIONER

## 2018-06-04 PROCEDURE — 71046 X-RAY EXAM CHEST 2 VIEWS: CPT | Mod: FY

## 2018-06-04 PROCEDURE — 82728 ASSAY OF FERRITIN: CPT | Performed by: NURSE PRACTITIONER

## 2018-06-04 ASSESSMENT — PATIENT HEALTH QUESTIONNAIRE - PHQ9: 5. POOR APPETITE OR OVEREATING: NOT AT ALL

## 2018-06-04 ASSESSMENT — ANXIETY QUESTIONNAIRES
GAD7 TOTAL SCORE: 0
3. WORRYING TOO MUCH ABOUT DIFFERENT THINGS: NOT AT ALL
2. NOT BEING ABLE TO STOP OR CONTROL WORRYING: NOT AT ALL
IF YOU CHECKED OFF ANY PROBLEMS ON THIS QUESTIONNAIRE, HOW DIFFICULT HAVE THESE PROBLEMS MADE IT FOR YOU TO DO YOUR WORK, TAKE CARE OF THINGS AT HOME, OR GET ALONG WITH OTHER PEOPLE: NOT DIFFICULT AT ALL
1. FEELING NERVOUS, ANXIOUS, OR ON EDGE: NOT AT ALL
5. BEING SO RESTLESS THAT IT IS HARD TO SIT STILL: NOT AT ALL
7. FEELING AFRAID AS IF SOMETHING AWFUL MIGHT HAPPEN: NOT AT ALL
6. BECOMING EASILY ANNOYED OR IRRITABLE: NOT AT ALL

## 2018-06-04 NOTE — MR AVS SNAPSHOT
After Visit Summary   6/4/2018    Shaina Calixto    MRN: [id number]           Patient Information     Date Of Birth          1945        Visit Information        Provider Department      6/4/2018 10:00 AM Lauren, Ami Ra, APRN CNP [facility]        Today's Diagnoses     History of anemia    -  1    Rash        Cough          Care Instructions    I ordered labs today.  I will let you know the results of these and the chest xray.            Follow-ups after your visit        Additional Services     DERMATOLOGY REFERRAL       Your provider has referred you to: ELIZABETH: My Dermatologist - [place] [phone]   [email or web address]    Please be aware that coverage of these services is subject to the terms and limitations of your health insurance plan.  Call member services at your health plan with any benefit or coverage questions.      Please bring the following with you to your appointment:    (1) Any X-Rays, CTs or MRIs which have been performed.  Contact the facility where they were done to arrange for  prior to your scheduled appointment.    (2) List of current medications  (3) This referral request   (4) Any documents/labs given to you for this referral                  Follow-up notes from your care team     Return in about 6 months (around 12/4/2018) for follow up.      Your next 10 appointments already scheduled     Jul 24, 2018  8:45 AM CDT   New Visit with MD Brittney Delgadoview Joel Montague ([facility])    [address] [place] [id number] [phone]              Future tests that were ordered for you today     Open Future Orders        Priority Expected Expires Ordered    XR Chest 2 Views Routine 6/4/2018 6/4/2019 6/4/2018            Who to contact     If you have questions or need follow up information about today's clinic visit or your schedule please contact [facility]  directly at [phone].  Normal or non-critical lab and imaging results will be communicated to you by MyChart, letter or phone within 4 business days after the clinic has received the results. If you do not hear from us within 7 days, please contact the clinic through Smartbill - Recurrence Backofficehart or phone. If you have a critical or abnormal lab result, we will notify you by phone as soon as possible.  Submit refill requests through Binary Thumb or call your pharmacy and they will forward the refill request to us. Please allow 3 business days for your refill to be completed.          Additional Information About Your Visit        Smartbill - Recurrence Backofficehart Information     Binary Thumb gives you secure access to your electronic health record. If you see a primary care provider, you can also send messages to your care team and make appointments. If you have questions, please call your primary care clinic.  If you do not have a primary care provider, please call [phone] and they will assist you.        Care EveryWhere ID     This is your Care EveryWhere ID. This could be used by other organizations to access your [place] medical records  [id number]        Your Vitals Were     Pulse Temperature Respirations Pulse Oximetry BMI (Body Mass Index)       57 97.6  F (36.4  C) (Oral) 14 96% 20.88 kg/m2        Blood Pressure from Last 3 Encounters:   06/04/18 126/70   01/12/18 120/70   08/07/17 118/60    Weight from Last 3 Encounters:   06/04/18 127 lb 6.4 oz (57.8 kg)   01/12/18 126 lb 12.8 oz (57.5 kg)   08/07/17 124 lb 12.8 oz (56.6 kg)              We Performed the Following     CBC with platelets     DEPRESSION ACTION PLAN (DAP)     DERMATOLOGY REFERRAL     Ferritin     Iron and iron binding capacity        Primary Care Provider Office Phone # Fax #    JULES Gillette Ra [facility] [phone]       [address]  [facility] [address]        Equal Access to Services     ARCADIO MANRIQUE : Jessie Hodges, nydia cardoso, [facility]  mackenzie coates laura carlson ah. So [facility] [phone].    ATENCIÓN: Si angela fuentes, tiene a lee disposición servicios gratuitos de asistencia lingüística. [place] al [phone].    We comply with applicable federal civil rights laws and [place] laws. We do not discriminate on the basis of race, color, national origin, age, disability, sex, sexual orientation, or gender identity.            Thank you!     Thank you for choosing [facility]  for your care. Our goal is always to provide you with excellent care. Hearing back from our patients is one way we can continue to improve our services. Please take a few minutes to complete the written survey that you may receive in the mail after your visit with us. Thank you!             Your Updated Medication List - Protect others around you: Learn how to safely use, store and throw away your medicines at www.disposemymeds.org.          This list is accurate as of 6/4/18 10:45 AM.  Always use your most recent med list.                   Brand Name Dispense Instructions for use Diagnosis    calcium-vitamin D 600-400 MG-UNIT per tablet    CALTRATE     Take 1 tablet by mouth 2 times daily        glutamine 500 MG capsule      Take 1 capsule (500 mg) by mouth 4 times daily        IRON (FERROUS GLUCONATE) PO      Floravital type        MAGNESIUM PO           OMEGA 3 PO           UNKNOWN MED DOSAGE      1 tablet Zinc 75        VITAMIN D3 PO      Take by mouth daily        VITAMIN K PO      Take 1 tablet by mouth        WOMENS BONE HEALTH PO

## 2018-06-04 NOTE — PROGRESS NOTES
SUBJECTIVE:   Shaina Calixto is a 72 year old female who presents to clinic today for the following health issues:      Fatigue      Duration: few months    Description (location/character/radiation): patient states that she feels tired     Intensity:  moderate    Accompanying signs and symptoms: None    History (similar episodes/previous evaluation): None    Precipitating or alleviating factors: None    Therapies tried and outcome: Iron supplement- somewhat effective     Patient states she has had a cough for intermittently for 1 year.    Pt interested in having iron levels rechecked.  Previously noted to be mildly anemic last year.  She took an iron supplement and responded nicely.  She went off of the supplement for about 5 months.  Started noticing some fatigue and restarted about 1 month ago - once daily iron supplement.  She is also undergoing SIBO treatment.  [place] and diet changes caused her to have additional symptoms.  She is back on the strict SIBO diet.    Problem list and histories reviewed & adjusted, as indicated.  Additional history: as documented    Patient Active Problem List   Diagnosis     Breast cancer screening     Mild major depression (H)     Yeast dermatitis     Stress     Urticaria     CARDIOVASCULAR SCREENING; LDL GOAL LESS THAN 160     High risk HPV infection     Past Surgical History:   Procedure Laterality Date     ARTHROSCOPY KNEE RT/LT Right 2004     FOOT SURGERY Right 1995    due to foot injury     FOOT SURGERY Right 2000    ayers's neuroma     LASER YAG CAPSULOTOMY Bilateral 12/29/15     PHACOEMULSIFICATION CLEAR CORNEA WITH STANDARD INTRAOCULAR LENS IMPLANT Right 10/1/14     PHACOEMULSIFICATION CLEAR CORNEA WITH STANDARD INTRAOCULAR LENS IMPLANT Left 10/08/14       Social History   Substance Use Topics     Smoking status: Never Smoker     Smokeless tobacco: Never Used     Alcohol use Yes      Comment: 1 glass a month     Family History   Problem  Relation Age of Onset     Endometrial Cancer Mother 73     only endometrial hyperplasia     Other - See Comments Mother      TIA     Parkinsonism Mother      C.A.D. Maternal Grandfather      CANCER Father      AML     Glaucoma No family hx of      DIABETES No family hx of      Macular Degeneration No family hx of            Reviewed and updated as needed this visit by clinical staff       Reviewed and updated as needed this visit by Provider         ROS:  SEE HPI.    OBJECTIVE:     /70 (BP Location: Right arm, Patient Position: Chair, Cuff Size: Adult Regular)  Pulse 57  Temp 97.6  F (36.4  C) (Oral)  Resp 14  Wt 127 lb 6.4 oz (57.8 kg)  SpO2 96%  BMI 20.88 kg/m2  Body mass index is 20.88 kg/(m^2).  GENERAL: healthy, alert and no distress  RESP: lungs clear to auscultation - no rales, rhonchi or wheezes  CV: regular rates and rhythm and normal S1 S2, no S3 or S4  SKIN:  A few scattered non blanching macules on lower extremities.  PSYCH: mentation appears normal, affect normal/bright    Diagnostic Test Results:  Results for orders placed or performed in visit on 06/04/18   Ferritin   Result Value Ref Range    Ferritin 38 8 - 252 ng/mL   CBC with platelets   Result Value Ref Range    WBC 4.1 4.0 - 11.0 10e9/L    RBC Count 4.09 3.8 - 5.2 10e12/L    Hemoglobin 13.7 11.7 - 15.7 g/dL    Hematocrit 40.2 35.0 - 47.0 %    MCV 98 78 - 100 fl    MCH 33.5 (H) 26.5 - 33.0 pg    MCHC 34.1 31.5 - 36.5 g/dL    RDW 12.3 10.0 - 15.0 %    Platelet Count 234 150 - 450 10e9/L   Iron and iron binding capacity   Result Value Ref Range    Iron 160 35 - 180 ug/dL    Iron Binding Cap 303 240 - 430 ug/dL    Iron Saturation Index 53 (H) 15 - 46 %       ASSESSMENT/PLAN:   1. History of anemia  Will check labs today and if normal she will determine if she would like to work up some of her fatigue further.  - Ferritin  - CBC with platelets  - Iron and iron binding capacity    2. Rash  Not a new rash.  I would like her to have these  biopsied.    - DERMATOLOGY REFERRAL    3. Cough  Persistent, mild.  Xray today for further evaluation.  If normal she is wondering if it is caused by GERD and will continue work with SIBO.  - XR Chest 2 Views; Future    JULES Gillette Ra, CNP  [facility]

## 2018-06-04 NOTE — TELEPHONE ENCOUNTER
Panel Management Review      Patient has the following on her problem list: None      Composite cancer screening  Chart review shows that this patient is due/due soon for the following Mammogram  Summary:    Patient is due/failing the following:   MAMMOGRAM    Action needed:   Patient needs referral/order: Mammogram    Type of outreach:    patient made aware of Mammogram need at 6/4/apt    Questions for provider review:    None                                                                                                                                    Meryl Calixto CMA       Chart routed to closed.

## 2018-06-04 NOTE — LETTER
My Depression Action Plan  Name: Shaina Calixto   Date of Birth 1945  Date: 6/4/2018    My doctor: Ami Aguilar Ra   My clinic: [facility]  [address] [place] [address] [phone]          GREEN    ZONE   Good Control    What it looks like:     Things are going generally well. You have normal up s and down s. You may even feel depressed from time to time, but bad moods usually last less than a day.   What you need to do:  1. Continue to care for yourself (see self care plan)  2. Check your depression survival kit and update it as needed  3. Follow your physician s recommendations including any medication.  4. Do not stop taking medication unless you consult with your physician first.           YELLOW         ZONE Getting Worse    What it looks like:     Depression is starting to interfere with your life.     It may be hard to get out of bed; you may be starting to isolate yourself from others.    Symptoms of depression are starting to last most all day and this has happened for several days.     You may have suicidal thoughts but they are not constant.   What you need to do:     1. Call your care team, your response to treatment will improve if you keep your care team informed of your progress. Yellow periods are signs an adjustment may need to be made.     2. Continue your self-care, even if you have to fake it!    3. Talk to someone in your support network    4. Open up your depression survival kit           RED    ZONE Medical Alert - Get Help    What it looks like:     Depression is seriously interfering with your life.     You may experience these or other symptoms: You can t get out of bed most days, can t work or engage in other necessary activities, you have trouble taking care of basic hygiene, or basic responsibilities, thoughts of suicide or death that will not go away, self-injurious behavior.     What you need to do:  1. Call your care team and  request a same-day appointment. If they are not available (weekends or after hours) call your local crisis line, emergency room or 911.            Depression Self Care Plan / Survival Kit    Self-Care for Depression  Here s the deal. Your body and mind are really not as separate as most people think.  What you do and think affects how you feel and how you feel influences what you do and think. This means if you do things that people who feel good do, it will help you feel better.  Sometimes this is all it takes.  There is also a place for medication and therapy depending on how severe your depression is, so be sure to consult with your medical provider and/ or Behavioral Health Consultant if your symptoms are worsening or not improving.     In order to better manage my stress, I will:    Exercise  Get some form of exercise, every day. This will help reduce pain and release endorphins, the  feel good  chemicals in your brain. This is almost as good as taking antidepressants!  This is not the same as joining a gym and then never going! (they count on that by the way ) It can be as simple as just going for a walk or doing some gardening, anything that will get you moving.      Hygiene   Maintain good hygiene (Get out of bed in the morning, Make your bed, Brush your teeth, Take a shower, and Get dressed like you were going to work, even if you are unemployed).  If your clothes don't fit try to get ones that do.    Diet  I will strive to eat foods that are good for me, drink plenty of water, and avoid excessive sugar, caffeine, alcohol, and other mood-altering substances.  Some foods that are helpful in depression are: complex carbohydrates, B vitamins, flaxseed, fish or fish oil, fresh fruits and vegetables.    Psychotherapy  I agree to participate in Individual Therapy (if recommended).    Medication  If prescribed medications, I agree to take them.  Missing doses can result in serious side effects.  I understand that  drinking alcohol, or other illicit drug use, may cause potential side effects.  I will not stop my medication abruptly without first discussing it with my provider.    Staying Connected With Others  I will stay in touch with my friends, family members, and my primary care provider/team.    Use your imagination  Be creative.  We all have a creative side; it doesn t matter if it s oil painting, sand castles, or mud pies! This will also kick up the endorphins.    Witness Beauty  (AKA stop and smell the roses) Take a look outside, even in mid-winter. Notice colors, textures. Watch the squirrels and birds.     Service to others  Be of service to others.  There is always someone else in need.  By helping others we can  get out of ourselves  and remember the really important things.  This also provides opportunities for practicing all the other parts of the program.    Humor  Laugh and be silly!  Adjust your TV habits for less news and crime-drama and more comedy.    Control your stress  Try breathing deep, massage therapy, biofeedback, and meditation. Find time to relax each day.     My support system    Clinic Contact:  Phone number:    Contact 1:  Phone number:    Contact 2:  Phone number:    Hinduism/:  Phone number:    Therapist:  Phone number:    Local crisis center:    Phone number:    Other community support:  Phone number:

## 2018-06-05 ENCOUNTER — TELEPHONE (OUTPATIENT)
Dept: FAMILY MEDICINE | Facility: CLINIC | Age: 73
End: 2018-06-05

## 2018-06-05 LAB
FERRITIN SERPL-MCNC: 38 NG/ML (ref 8–252)
IRON SATN MFR SERPL: 53 % (ref 15–46)
IRON SERPL-MCNC: 160 UG/DL (ref 35–180)
TIBC SERPL-MCNC: 303 UG/DL (ref 240–430)

## 2018-06-05 ASSESSMENT — ANXIETY QUESTIONNAIRES: GAD7 TOTAL SCORE: 0

## 2018-06-05 ASSESSMENT — PATIENT HEALTH QUESTIONNAIRE - PHQ9: SUM OF ALL RESPONSES TO PHQ QUESTIONS 1-9: 1

## 2018-06-05 NOTE — TELEPHONE ENCOUNTER
Patient left message at 9:44 a.m. Questioning who she should see for her Dermatology appointment. She has already been scheduled with Dr. Brasher in [place] but she sees that her referral is for [place]. She gave permission to leave a message on her voice mail.    Returned patient's call. Left VM that Dr. Brasher is a part of the [facility] and she should be fine keeping that appointment but she can always call the number listed on her referral to verify everything is in order.    Coral JUAREZ, Triage RN

## 2018-07-06 DIAGNOSIS — Z86.2 HISTORY OF ANEMIA: ICD-10-CM

## 2018-07-06 PROCEDURE — 36415 COLL VENOUS BLD VENIPUNCTURE: CPT | Performed by: NURSE PRACTITIONER

## 2018-07-06 PROCEDURE — 83540 ASSAY OF IRON: CPT | Performed by: NURSE PRACTITIONER

## 2018-07-06 PROCEDURE — 83550 IRON BINDING TEST: CPT | Performed by: NURSE PRACTITIONER

## 2018-07-07 LAB
IRON SATN MFR SERPL: 29 % (ref 15–46)
IRON SERPL-MCNC: 86 UG/DL (ref 35–180)
TIBC SERPL-MCNC: 293 UG/DL (ref 240–430)

## 2018-07-24 ENCOUNTER — OFFICE VISIT (OUTPATIENT)
Dept: DERMATOLOGY | Facility: CLINIC | Age: 73
End: 2018-07-24
Payer: COMMERCIAL

## 2018-07-24 DIAGNOSIS — D22.9 MULTIPLE NEVI: Primary | ICD-10-CM

## 2018-07-24 DIAGNOSIS — Z12.83 SCREENING FOR SKIN CANCER: ICD-10-CM

## 2018-07-24 DIAGNOSIS — D18.01 CHERRY ANGIOMA: ICD-10-CM

## 2018-07-24 PROCEDURE — 99203 OFFICE O/P NEW LOW 30 MIN: CPT | Performed by: DERMATOLOGY

## 2018-07-24 NOTE — MR AVS SNAPSHOT
After Visit Summary   7/24/2018    Shaina Calixto    MRN: [id number]           Patient Information     Date Of Birth          1945        Visit Information        Provider Department      7/24/2018 8:45 AM Breonna Brasher MD [facility]        Today's Diagnoses     Multiple nevi    -  1    Cherry angioma        Screening for skin cancer          Care Instructions                    Pediatric Dermatology  [facility]  [address]  1st [place]  [address]  Phone: [phone]    Pediatric & Adult Dermatology  [facility]  [address]  2nd Floor  [facility] [address]  [phone]                  General information: Dr. Breonna Brasher is a board-certified dermatologist with subspecialty certification in pediatric dermatology.     Scheduling and Nurse Triage: Dr. Brasher sees pediatric patients on Mondays in [place] and adult and pediatric patients on Tuesdays in [place]. The remainder of the week she practices at the [facility]. Please call the above phone numbers to schedule or to talk to a nurse.     -For scheduling at the [place] or [place] locations, or to talk to the triage nurse please call the above phone number at the clinic where you were seen.     -For medication refills, please call your pharmacy.                     Follow-ups after your visit        Who to contact     If you have questions or need follow up information about today's clinic visit or your schedule please contact [facility] directly at [phone].  Normal or non-critical lab and imaging results will be communicated to you by MyChart, letter or phone within 4 business days after the clinic has received the results. If you do not hear from us within 7 days, please contact the clinic through MyChart or phone. If you have a critical or abnormal lab result,  we will notify you by phone as soon as possible.  Submit refill requests through [employer] or call your pharmacy and they will forward the refill request to us. Please allow 3 business days for your refill to be completed.          Additional Information About Your Visit        Media Chaperonehart Information     [employer] gives you secure access to your electronic health record. If you see a primary care provider, you can also send messages to your care team and make appointments. If you have questions, please call your primary care clinic.  If you do not have a primary care provider, please call [phone] and they will assist you.        Care EveryWhere ID     This is your Care EveryWhere ID. This could be used by other organizations to access your [facility] medical records  [id number]         Blood Pressure from Last 3 Encounters:   06/04/18 126/70   01/12/18 120/70   08/07/17 118/60    Weight from Last 3 Encounters:   06/04/18 127 lb 6.4 oz (57.8 kg)   01/12/18 126 lb 12.8 oz (57.5 kg)   08/07/17 124 lb 12.8 oz (56.6 kg)              Today, you had the following     No orders found for display       Primary Care Provider Office Phone # Fax #    Ami JULES [facility] [phone]       [address] [facility] [address]        Equal Access to Services     [facility]: Hadii aad ku hadasho Soomaali, waaxda luqadaha, qaybta kaalmada adeegyada, waxay idiin hayhever carlson . So [facility] [phone].    ATENCIÓN: Si habla español, tiene a lee disposición servicios gratuitos de asistencia lingüística. Llame al [phone].    We comply with applicable federal civil rights laws and [place] laws. We do not discriminate on the basis of race, color, national origin, age, disability, sex, sexual orientation, or gender identity.            Thank you!     Thank you for choosing [facility]  for your care. Our goal is always to provide you with excellent care. Hearing back from our patients  is one way we can continue to improve our services. Please take a few minutes to complete the written survey that you may receive in the mail after your visit with us. Thank you!             Your Updated Medication List - Protect others around you: Learn how to safely use, store and throw away your medicines at www.disposemymeds.org.          This list is accurate as of 7/24/18  3:21 PM.  Always use your most recent med list.                   Brand Name Dispense Instructions for use Diagnosis    calcium-vitamin D 600-400 MG-UNIT per tablet    CALTRATE     Take 1 tablet by mouth 2 times daily        glutamine 500 MG capsule      Take 1 capsule (500 mg) by mouth 4 times daily        IRON (FERROUS GLUCONATE) PO      Floravital type        MAGNESIUM PO           OMEGA 3 PO           UNKNOWN MED DOSAGE      1 tablet Zinc 75        VITAMIN D3 PO      Take by mouth daily        VITAMIN K PO      Take 1 tablet by mouth        WOMENS BONE HEALTH PO

## 2018-07-24 NOTE — LETTER
7/24/2018      RE: Shaina Calixto  [address] [facility] [address]       Dermatology Clinic Note    Dermatology Problem List:  1. Benign nevi  2. BLK  3. History of blue nevus r arm biopsy in 3/12  4. AK  5. Suspected contact dermatitis. Biopsy of L lower abdomen in 12/16 by [facility]. Increased eos in epidermis. No patch testing performed. Pt given prescription for triamcinolone ointment which she did not use due to concern for allergy.   6. 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 History of urticaria         CC: Patient presents with:  Consult: new pt, ref by Ami Aguilar, start spots on and off for the last 6 months sx spots on both legs were there for less than a week and disappeared hx of CBO, histamine related problems, rash on lower torso, 2 past episodes of Wells Syndrome  tx none       HPI: Shaina Calixto is a 72 year old female presenting for initial evaluation of nevi.  She has no history of skin cancer or dysplastic nevus. She notes a longstanding history of various skin eruptions (see derm problem list). Most recently she noted a pruritic eruption on the buttock and abdomen over the last 3 li. Since beginning supplements to treat her concerns for small bowel overgrowth the rash has improved and she is not requesting evaluation of that eruption today. She has been applying a moisturizer from the coop to the rash when it occurs.       Patient Active Problem List   Diagnosis     Breast cancer screening     Mild major depression (H)     Yeast dermatitis     Stress     Urticaria     CARDIOVASCULAR SCREENING; LDL GOAL LESS THAN 160     High risk HPV infection       Allergies   Allergen Reactions     Floraquin [Iodoquinol]      Quinolones      Other reaction(s): Other - Describe In Comment Field  Lost sense of smell.  Pt would prefer to not take any of the medications in Fluoroquninolone group of antibiotics.     Cephalexin Rash     Cortisone Rash     Keflex  [Cephalexin Monohydrate] Rash     Penicillins Rash     Triamcinolone Rash     Pt reports rash starting on face then spreading to arms, legs, and truck a few days after a knee injection         Current Outpatient Prescriptions   Medication     calcium-vitamin D (CALTRATE) 600-400 MG-UNIT per tablet     Cholecalciferol (VITAMIN D3 PO)     glutamine 500 MG capsule     IRON, FERROUS GLUCONATE, PO     MAGNESIUM PO     Multiple Vitamins-Minerals (WOMENS BONE HEALTH PO)     [place]-3 Fatty Acids (OMEGA 3 PO)     UNKNOWN MED DOSAGE     VITAMIN K PO     No current facility-administered medications for this visit.        Family History   Problem Relation Age of Onset     Endometrial Cancer Mother 73     only endometrial hyperplasia     Other - See Comments Mother      TIA     Parkinsonism Mother      C.A.D. Maternal Grandfather      Cancer Father      AML     Glaucoma No family hx of      Diabetes No family hx of      Macular Degeneration No family hx of        Social History     Social History     Marital status: Single     Spouse name: N/A     Number of children: N/A     Years of education: N/A     Occupational History     Not on file.     Social History Main Topics     Smoking status: Never Smoker     Smokeless tobacco: Never Used     Alcohol use Yes      Comment: 1 glass a month     Drug use: No     Sexual activity: No     Other Topics Concern     Parent/Sibling W/ Cabg, Mi Or Angioplasty Before 65f 55m? No     Social History Narrative    How much exercise per week? Walking    How much calcium per day? Boron and dairy       How much caffeine per day? 1-2 cups coffee    How much vitamin D per day? supplement    Do you/your family wear seatbelts?  Yes    Do you/your family use safety helmets? Yes    Do you/your family use sunscreen? Yes    Do you/your family keep firearms in the home? No    Do you/your family have a smoke detector(s)? Yes        Do you feel safe in your home? Yes    Has anyone ever touched you in an unwanted  manner? No     Explain          March 26, 2015 Maria G Cardenas TANIA                     ROS: Feeling well without other skin concerns.     EXAM:  There were no vitals taken for this visit.  GEN: Alert, no distress  HEENT: Conjunctiva clear.   PULM: Breathing comfortably on RA  CV: Extrem warm and well perfused  ABD: No distension  SKIN: Exam of the face, neck, chest, abdomen, back, arms, legs, hands, feet, buttocks. Normal except as follows:    --Scattered cherry red papules 1 mm on the legs, abdomen, back  --4 mm smooth pink papule on the central chest  --scattered 2-3 mm medium brown macules on the back, arms, legs, face  --Scattered 2-3 mm pink and light brown papules on the face, back  --Mild perirectal erythema without scale      Assessment and Plan:    1. Cherry angiomas: Benign vascular papules, often familial. No treatment advised.     2. Benign pigmented nevi: No lesions of concern. Sun protection recommended. Discussed ABCDEs of malignant melanoma.     3. History of pruritic eruption on the abdomen and legs, buttocks. Biopsy c/w suspected contact dermatitis. Would recommend light treatment given steroid allergy and referral for patch testing if recurs.     4. History of actinic keratosis: No lesions today. Yearly skin check advised.     5. Perirectal erythema: Differential diagnosis includes intertrigo vs irritant dermatitis vs contact allergy. Improving per patient. No treatment advised.     RTC 1 year.     Thank you for involving me in this patient's care.     Breonna Brasher MD  Dermatology Staff    CC:     JULES Gillette Ra CNP

## 2018-07-24 NOTE — PATIENT INSTRUCTIONS
Pediatric Dermatology  [facility]  [address]  1st [place]  [address]  Phone: [phone]    Pediatric & Adult Dermatology  [facility]  [address] [facility]   2nd [address]  [facility] [address]  [phone]                  General information: Dr. Breonna Brasher is a board-certified dermatologist with subspecialty certification in pediatric dermatology.     Scheduling and Nurse Triage: Dr. Brasher sees pediatric patients on Mondays in [place] and adult and pediatric patients on Tuesdays in [place]. The remainder of the week she practices at the [facility]. Please call the above phone numbers to schedule or to talk to a nurse.     -For scheduling at the [place] or [place] locations, or to talk to the triage nurse please call the above phone number at the clinic where you were seen.     -For medication refills, please call your pharmacy.

## 2018-07-24 NOTE — PROGRESS NOTES
Dermatology Clinic Note    Dermatology Problem List:  1. Benign nevi  2. BLK  3. History of blue nevus r arm biopsy in 3/12  4. AK  5. Suspected contact dermatitis. Biopsy of L lower abdomen in 12/16 by [facility]. Increased eos in epidermis. No patch testing performed. Pt given prescription for triamcinolone ointment which she did not use due to concern for allergy.   6. History of Well's diagnosed in [facility] in 1996 and 1999  7. History of urticaria         CC: Patient presents with:  Consult: new pt, ref by Ami Aguilar, start spots on and off for the last 6 months sx spots on both legs were there for less than a week and disappeared hx of CBO, histamine related problems, rash on lower torso, 2 past episodes of Wells Syndrome  tx none       HPI: Shaina Calixto is a 72 year old female presenting for initial evaluation of nevi.  She has no history of skin cancer or dysplastic nevus. She notes a longstanding history of various skin eruptions (see derm problem list). Most recently she noted a pruritic eruption on the buttock and abdomen over the last 3 li. Since beginning supplements to treat her concerns for small bowel overgrowth the rash has improved and she is not requesting evaluation of that eruption today. She has been applying a moisturizer from the coop to the rash when it occurs.       Patient Active Problem List   Diagnosis     Breast cancer screening     Mild major depression (H)     Yeast dermatitis     Stress     Urticaria     CARDIOVASCULAR SCREENING; LDL GOAL LESS THAN 160     High risk HPV infection       Allergies   Allergen Reactions     Floraquin [Iodoquinol]      Quinolones      Other reaction(s): Other - Describe In Comment Field  Lost sense of smell.  Pt would prefer to not take any of the medications in Fluoroquninolone group of antibiotics.     Cephalexin Rash     Cortisone Rash     Keflex [Cephalexin Monohydrate] Rash     Penicillins Rash     Triamcinolone Rash     Pt reports  rash starting on face then spreading to arms, legs, and truck a few days after a knee injection         Current Outpatient Prescriptions   Medication     calcium-vitamin D (CALTRATE) 600-400 MG-UNIT per tablet     Cholecalciferol (VITAMIN D3 PO)     glutamine 500 MG capsule     IRON, FERROUS GLUCONATE, PO     MAGNESIUM PO     Multiple Vitamins-Minerals (WOMENS BONE HEALTH PO)     [place]-3 Fatty Acids (OMEGA 3 PO)     UNKNOWN MED DOSAGE     VITAMIN K PO     No current facility-administered medications for this visit.        Family History   Problem Relation Age of Onset     Endometrial Cancer Mother 73     only endometrial hyperplasia     Other - See Comments Mother      TIA     Parkinsonism Mother      C.A.D. Maternal Grandfather      Cancer Father      AML     Glaucoma No family hx of      Diabetes No family hx of      Macular Degeneration No family hx of        Social History     Social History     Marital status: Single     Spouse name: N/A     Number of children: N/A     Years of education: N/A     Occupational History     Not on file.     Social History Main Topics     Smoking status: Never Smoker     Smokeless tobacco: Never Used     Alcohol use Yes      Comment: 1 glass a month     Drug use: No     Sexual activity: No     Other Topics Concern     Parent/Sibling W/ Cabg, Mi Or Angioplasty Before 65f 55m? No     Social History Narrative    How much exercise per week? Walking    How much calcium per day? Boron and dairy       How much caffeine per day? 1-2 cups coffee    How much vitamin D per day? supplement    Do you/your family wear seatbelts?  Yes    Do you/your family use safety helmets? Yes    Do you/your family use sunscreen? Yes    Do you/your family keep firearms in the home? No    Do you/your family have a smoke detector(s)? Yes        Do you feel safe in your home? Yes    Has anyone ever touched you in an unwanted manner? No     Explain          March 26, 2015 Maria G Cardenas LPN                     ROS:  Feeling well without other skin concerns.     EXAM:  There were no vitals taken for this visit.  GEN: Alert, no distress  HEENT: Conjunctiva clear.   PULM: Breathing comfortably on RA  CV: Extrem warm and well perfused  ABD: No distension  SKIN: Exam of the face, neck, chest, abdomen, back, arms, legs, hands, feet, buttocks. Normal except as follows:    --Scattered cherry red papules 1 mm on the legs, abdomen, back  --4 mm smooth pink papule on the central chest  --scattered 2-3 mm medium brown macules on the back, arms, legs, face  --Scattered 2-3 mm pink and light brown papules on the face, back  --Mild perirectal erythema without scale      Assessment and Plan:    1. Cherry angiomas: Benign vascular papules, often familial. No treatment advised.     2. Benign pigmented nevi: No lesions of concern. Sun protection recommended. Discussed ABCDEs of malignant melanoma.     3. History of pruritic eruption on the abdomen and legs, buttocks. Biopsy c/w suspected contact dermatitis. Would recommend light treatment given steroid allergy and referral for patch testing if recurs.     4. History of actinic keratosis: No lesions today. Yearly skin check advised.     5. Perirectal erythema: Differential diagnosis includes intertrigo vs irritant dermatitis vs contact allergy. Improving per patient. No treatment advised.     RTC 1 year.     Thank you for involving me in this patient's care.     Breonna Brasher MD  Dermatology Staff    CC:     Ami Aguilar, JULES CNP

## 2018-08-14 ENCOUNTER — TRANSFERRED RECORDS (OUTPATIENT)
Dept: HEALTH INFORMATION MANAGEMENT | Facility: CLINIC | Age: 73
End: 2018-08-14

## 2018-08-14 LAB
CREATININE (EXTERNAL): 0.9 MG/DL (ref 0.52–1.04)
GFR ESTIMATED (EXTERNAL): >60 ML/MIN/1.73M2
GFR ESTIMATED (IF AFRICAN AMERICAN) (EXTERNAL): >60 ML/MIN/1.73M2
GLUCOSE (EXTERNAL): 97 MG/DL (ref 74–106)
POTASSIUM (EXTERNAL): 3.9 MMOL/L (ref 3.5–5.1)

## 2018-08-15 ENCOUNTER — TELEPHONE (OUTPATIENT)
Dept: FAMILY MEDICINE | Facility: CLINIC | Age: 73
End: 2018-08-15

## 2018-08-15 NOTE — TELEPHONE ENCOUNTER
Patient left telephone message yesterday at 4:12 p.m.   Seen in [facility].  Returned call for further triage.    Patient thinks it might be a stress reaction.     BP Readings from Last 6 Encounters:   06/04/18 126/70   01/12/18 120/70   08/07/17 118/60   06/26/17 125/76   04/05/17 106/70   04/04/17 98/66     Next 5 appointments (look out 90 days)     Aug 30, 2018  9:20 AM CDT   [facility]    [address] [place]  [facility] [id number] [phone]                Patient will come to pharmacy tomorrow to have her blood checked then follow up with MD on 8/30/18.

## 2018-08-17 ENCOUNTER — ALLIED HEALTH/NURSE VISIT (OUTPATIENT)
Dept: FAMILY MEDICINE | Facility: CLINIC | Age: 73
End: 2018-08-17
Payer: COMMERCIAL

## 2018-08-17 VITALS — SYSTOLIC BLOOD PRESSURE: 128 MMHG | DIASTOLIC BLOOD PRESSURE: 64 MMHG

## 2018-08-17 DIAGNOSIS — Z01.30 BLOOD PRESSURE CHECK: Primary | ICD-10-CM

## 2018-08-17 PROCEDURE — 99207 ZZC NO CHARGE NURSE ONLY: CPT | Performed by: NURSE PRACTITIONER

## 2018-08-17 NOTE — MR AVS SNAPSHOT
After Visit Summary   8/17/2018    Shaina Calixto    MRN: [id number]           Patient Information     Date Of Birth          1945        Visit Information        Provider Department      8/17/2018 1:41 PM Ami Aguilar Ra, APRN CNP [facility]        Today's Diagnoses     Blood pressure check    -  1       Follow-ups after your visit        Your next 10 appointments already scheduled     Aug 30, 2018  9:20 AM CDT   Office Visit with Chelsea Cornelius MD   [facility]    [address]  [facility] [id number] [phone]           Bring a current list of meds and any records pertaining to this visit. For Physicals, please bring immunization records and any forms needing to be filled out. Please arrive 10 minutes early to complete paperwork.              Who to contact     If you have questions or need follow up information about today's clinic visit or your schedule please contact [facility] directly at [phone].  Normal or non-critical lab and imaging results will be communicated to you by [employer], letter or phone within 4 business days after the clinic has received the results. If you do not hear from us within 7 days, please contact the clinic through [employer] or phone. If you have a critical or abnormal lab result, we will notify you by phone as soon as possible.  Submit refill requests through [employer] or call your pharmacy and they will forward the refill request to us. Please allow 3 business days for your refill to be completed.          Additional Information About Your Visit        [employer] Information     [employer] gives you secure access to your electronic health record. If you see a primary care provider, you can also send messages to your care team and make appointments. If you have questions, please call your primary care clinic.  If you do not have a primary care provider,  please call [phone] and they will assist you.        Care EveryWhere ID     This is your Care EveryWhere ID. This could be used by other organizations to access your [facility] medical records  [id number]         Blood Pressure from Last 3 Encounters:   08/17/18 128/64   06/04/18 126/70   01/12/18 120/70    Weight from Last 3 Encounters:   06/04/18 127 lb 6.4 oz (57.8 kg)   01/12/18 126 lb 12.8 oz (57.5 kg)   08/07/17 124 lb 12.8 oz (56.6 kg)              Today, you had the following     No orders found for display       Primary Care Provider Office Phone # Fax #    Ami JULES Tejeda [facility] [phone] [address] [facility] [address]        Equal Access to Services     SCARLETT MANRIQUE : Hadii aad ku hadasho Soomaali, waaxda luqadaha, qaybta kaalmada adeegyada, mackenzie bryant hayhever carlson . So [facility] [phone].    ATENCIÓN: Si habla español, tiene a lee disposición servicios gratuitos de asistencia lingüística. [place] al [phone].    We comply with applicable federal civil rights laws and [place] laws. We do not discriminate on the basis of race, color, national origin, age, disability, sex, sexual orientation, or gender identity.            Thank you!     Thank you for choosing [facility]  for your care. Our goal is always to provide you with excellent care. Hearing back from our patients is one way we can continue to improve our services. Please take a few minutes to complete the written survey that you may receive in the mail after your visit with us. Thank you!             Your Updated Medication List - Protect others around you: Learn how to safely use, store and throw away your medicines at www.disposemymeds.org.          This list is accurate as of 8/17/18  1:43 PM.  Always use your most recent med list.                   Brand Name Dispense Instructions for use Diagnosis    calcium-vitamin D 600-400 MG-UNIT per tablet    CALTRATE     Take 1  tablet by mouth 2 times daily        glutamine 500 MG capsule      Take 1 capsule (500 mg) by mouth 4 times daily        IRON (FERROUS GLUCONATE) PO      Floravital type        MAGNESIUM PO           OMEGA 3 PO           UNKNOWN MED DOSAGE      1 tablet Zinc 75        VITAMIN D3 PO      Take by mouth daily        VITAMIN K PO      Take 1 tablet by mouth        WOMENS BONE HEALTH PO

## 2018-08-17 NOTE — PROGRESS NOTES
Shaina Calixto is enrolled/participating in the retail pharmacy Blood Pressure Goals Achievement Program (BPGAP).  Shaina Calixto was evaluated at [facility] on August 17, 2018 at which time her blood pressure was:    BP Readings from Last 3 Encounters:   08/17/18 128/64   06/04/18 126/70   01/12/18 120/70     Reviewed lifestyle modifications for blood pressure control and reduction: including making healthy food choices, managing weight, getting regular exercise, smoking cessation, reducing alcohol consumption, monitoring blood pressure regularly.     Shaina Calixto is not experiencing symptoms.    Follow-Up: BP is at goal of < 140/90mmHg (patient 18+ years of age with or without diabetes).  Recommended follow-up at pharmacy in 6 months.     Recommendation to Provider: continue to monitor    Shaina Calixto was evaluated for enrollment into the PGEN study today.    Patient eligible for enrollment:  No  Patient interested in enrollment:  No    Completed by: Geno Madrigal, PharmD  [facility]

## 2018-08-20 ENCOUNTER — TELEPHONE (OUTPATIENT)
Dept: INTERNAL MEDICINE | Facility: CLINIC | Age: 73
End: 2018-08-20

## 2018-08-20 NOTE — TELEPHONE ENCOUNTER
I see no openings this week or next week.   She is scheduled for Aug 30.     Sorry, I can't give advices without seeing the patient
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
## 2018-08-22 ENCOUNTER — TRANSFERRED RECORDS (OUTPATIENT)
Dept: HEALTH INFORMATION MANAGEMENT | Facility: CLINIC | Age: 73
End: 2018-08-22

## 2018-08-22 LAB
CREATININE (EXTERNAL): 0.7 MG/DL (ref 0.52–1.04)
GFR ESTIMATED (EXTERNAL): >60 ML/MIN/1.73M2
GFR ESTIMATED (IF AFRICAN AMERICAN) (EXTERNAL): >60 ML/MIN/1.73M2
GLUCOSE (EXTERNAL): 94 MG/DL (ref 74–106)
POTASSIUM (EXTERNAL): 3.9 MMOL/L (ref 3.5–5.1)

## 2018-08-23 NOTE — TELEPHONE ENCOUNTER
Patient advised.  Sounds upbeat and positive and is taking into consideration that her symptoms may be related to being under stress.  States she has taken some steps to work on this and states she has developed a different approach to finding a solution or at least improving how she deals with stress.  Will keep appt as scheduled.  ANAYELI Roger R.N.   ANAYELI Roger R.N.

## 2018-08-30 ENCOUNTER — OFFICE VISIT (OUTPATIENT)
Dept: INTERNAL MEDICINE | Facility: CLINIC | Age: 73
End: 2018-08-30
Payer: COMMERCIAL

## 2018-08-30 VITALS
DIASTOLIC BLOOD PRESSURE: 62 MMHG | HEIGHT: 66 IN | TEMPERATURE: 97.8 F | RESPIRATION RATE: 16 BRPM | OXYGEN SATURATION: 98 % | SYSTOLIC BLOOD PRESSURE: 136 MMHG | BODY MASS INDEX: 19.61 KG/M2 | HEART RATE: 61 BPM | WEIGHT: 122 LBS

## 2018-08-30 DIAGNOSIS — R10.13 DYSPEPSIA: ICD-10-CM

## 2018-08-30 DIAGNOSIS — G25.81 RESTLESS LEGS SYNDROME (RLS): ICD-10-CM

## 2018-08-30 DIAGNOSIS — I73.00 RAYNAUD'S DISEASE WITHOUT GANGRENE: ICD-10-CM

## 2018-08-30 DIAGNOSIS — G47.33 OSA (OBSTRUCTIVE SLEEP APNEA): ICD-10-CM

## 2018-08-30 DIAGNOSIS — B97.7 HPV (HUMAN PAPILLOMA VIRUS) INFECTION: ICD-10-CM

## 2018-08-30 DIAGNOSIS — L30.9 ECZEMA, UNSPECIFIED TYPE: ICD-10-CM

## 2018-08-30 DIAGNOSIS — M81.0 AGE-RELATED OSTEOPOROSIS WITHOUT CURRENT PATHOLOGICAL FRACTURE: ICD-10-CM

## 2018-08-30 DIAGNOSIS — R55 NEAR SYNCOPE: Primary | ICD-10-CM

## 2018-08-30 DIAGNOSIS — M72.2 PLANTAR FASCIITIS: ICD-10-CM

## 2018-08-30 PROCEDURE — 99214 OFFICE O/P EST MOD 30 MIN: CPT | Performed by: INTERNAL MEDICINE

## 2018-08-30 ASSESSMENT — PATIENT HEALTH QUESTIONNAIRE - PHQ9
SUM OF ALL RESPONSES TO PHQ QUESTIONS 1-9: 0
10. IF YOU CHECKED OFF ANY PROBLEMS, HOW DIFFICULT HAVE THESE PROBLEMS MADE IT FOR YOU TO DO YOUR WORK, TAKE CARE OF THINGS AT HOME, OR GET ALONG WITH OTHER PEOPLE: NOT DIFFICULT AT ALL
SUM OF ALL RESPONSES TO PHQ QUESTIONS 1-9: 0

## 2018-08-30 NOTE — PROGRESS NOTES
ASSESSMENT & PLAN:                                                      (R55) Near syncope  (primary encounter diagnosis)  Comment: Patient believes that this was secondary anxiety.  I am not sure at this time why she had these episodes  Plan: I advised her if she feels palpitation, sudden episodes of unexplained weakness/fatigue she will need a heart monitor.    (R10.13) Dyspepsia  (L30.9) Eczema, unspecified type  (M72.2) Plantar fasciitis  (G25.81) Restless legs syndrome (RLS)  (I73.00) Raynaud's disease without gangrene  Comment: Controlled with present treatments through the alternative medicine providers  Plan: I explained her that I have no knowledge  about these treatments.  Her labs have been normal.  I am not against alternative treatments as long as she feels well, labs are okay.  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  She will accept traditional medicine only for acute care.  She sees naturist, holistic, functional and acupuncture doctors  Looking for a new doctor, in case she needs a acute care  She wants to discuss with me her chronic medical problems        SUBJECTIVE:                                                    History of present illness       Used to see NP, but retired 2 years ago. Trying to find MD to establish care    Recent urgent care visits  2 episodes of near syncope in the last week that took her to . She thinks she figured it out  what happened. She feels it was anxiety. Somebody suggested her to have a stress test, but she doesn't want to do it now because she is dealing with small intestine bacterial overgrowth She is dealing with an alternative doctor for this - naturist doctor in [place].   In urgent care she was advised to talk to me and schedule stress test.  She denies rest or exercise chest pain or shortness of breath.  I told her I see no reasons for a stress test.  We can consider a heart monitor.  She does not feel anypalpitaions - doesn't want heart monitor for now. She will think about     Abdominal symptoms  She states she had Vit def because of the bowel deficiency - she takes a lot of supplements. She feels distension and some pain sometimes.     Skin condition  She has eczema and hives, she considered associated them with the bowel syndrome.     Osteoporosis  She has osteoporosis - doesn't not want convential meds    SABRA - she wears appliance    Raynaud syndrome - symptoms in winter.  No gangrene.  Not interested in medications    Depression  On antidepression meds x 20 y, stopped them 4 years ago. Doing well with no meds. Does meditation     Hx of ankylosing spndilitis - 1987, doind well on homeopathy tx    HPV pos  In the last 3 years she was dx with HPV ( 16, 18) but neg pap x 3. She sees OBGYN     RLS, plantar fasciitis - takes Acupuncture and PT and Mg     Hx of exposure to black mold x 4 y  in the basement. Taken care over a year ago     Hx of Arthroscopic surgery on the R knee, ayers neuroma on R foot.     She does not do mammogram.   She does thermography        I reviewed in care everywhere labs Aug 22 - in acceptable range    Last colonoscopy 2015 - normal         ROS:                                                      ROS: negative for fever, chills, cough, wheezes, chest pain, shortness of breath, vomiting,  leg swelling positive for abdominal discomfort, as above      OBJECTIVE:                                  "                   Physical Exam :    Blood pressure 136/62, pulse 61, temperature 97.8  F (36.6  C), temperature source Oral, resp. rate 16, height 5' 5.5\" (1.664 m), weight 122 lb (55.3 kg), SpO2 98 %, not currently breastfeeding.   NAD, appears comfortable  Skin: no rashes   HEENT: PERRLA, EOMI, pink conjunctiva, anicteric sclerae, bilateral tympanic membranes are clinically normal, oropharynx is normal color  Neck: supple, no JVD, No thyroidmegaly. Lymph nodes nonpalpable cervical and supraclavicular.  Chest: clear to auscultation bilaterally, good respiratory effort  Heart: S1 S2, RRR, no mgr appreciated  Abdomen: soft, not tender, no hepatosplenomegaly or masses appreciated, no abdominal bruit, present bowel sounds  Extremities: no edema,   Neurologic: A, Ox3, no focal signs appreciated    PMHx: reviewed  Past Medical History:   Diagnosis Date     Ankylosing spondylitis (H)      HLAB19 +     Eosinophilic cellulitis      Mild major depression (H)      Osteopenia     -2.1; Lumbar -2.5; Fem Neck      Small intestinal bacterial overgrowth 05/2017    has had for years but finally diagnosed correctly May 2017     Vitamin deficiency       PSHx: reviewed  Past Surgical History:   Procedure Laterality Date     ARTHROSCOPY KNEE RT/LT Right 2004     FOOT SURGERY Right 1995    due to foot injury     FOOT SURGERY Right 2000    ayers's neuroma     LASER YAG CAPSULOTOMY Bilateral 12/29/15     PHACOEMULSIFICATION CLEAR CORNEA WITH STANDARD INTRAOCULAR LENS IMPLANT Right 10/1/14     PHACOEMULSIFICATION CLEAR CORNEA WITH STANDARD INTRAOCULAR LENS IMPLANT Left 10/08/14        Meds: reviewed  Current Outpatient Prescriptions   Medication Sig Dispense Refill     calcium-vitamin D (CALTRATE) 600-400 MG-UNIT per tablet Take 1 tablet by mouth 2 times daily       Cholecalciferol (VITAMIN D3 PO) Take by mouth daily       glutamine 500 MG capsule Take 1 capsule (500 mg) by mouth 4 times daily       MAGNESIUM PO        Multiple " Vitamins-Minerals (WOMENS BONE HEALTH PO)        Omega-3 Fatty Acids (OMEGA 3 PO)        UNKNOWN MED DOSAGE 1 tablet Zinc 75       VITAMIN K PO Take 1 tablet by mouth         Soc Hx: reviewed  Fam Hx: reviewed          Chelsea Swann MD  Internal Medicine     Answers for HPI/ROS submitted by the patient on 8/30/2018   If you checked off any problems, how difficult have these problems made it for you to do your work, take care of things at home, or get along with other people?: Not difficult at all  PHQ9 TOTAL SCORE: 0

## 2018-08-30 NOTE — MR AVS SNAPSHOT
After Visit Summary   8/30/2018    Shaina Calixto    MRN: [id number]           Patient Information     Date Of Birth          1945        Visit Information        Provider Department      8/30/2018 9:20 AM Chelsea Cornelius MD [facility]        Today's Diagnoses     Near syncope    -  1    Dyspepsia        Eczema, unspecified type        Age-related osteoporosis without current pathological fracture        SABRA (obstructive sleep apnea)        Raynaud's disease without gangrene        HPV (human papilloma virus) infection        Plantar fasciitis        Restless legs syndrome (RLS)           Follow-ups after your visit        Who to contact     If you have questions or need follow up information about today's clinic visit or your schedule please contact [facility] directly at [phone].  Normal or non-critical lab and imaging results will be communicated to you by MyChart, letter or phone within 4 business days after the clinic has received the results. If you do not hear from us within 7 days, please contact the clinic through MyChart or phone. If you have a critical or abnormal lab result, we will notify you by phone as soon as possible.  Submit refill requests through [employer] or call your pharmacy and they will forward the refill request to us. Please allow 3 business days for your refill to be completed.          Additional Information About Your Visit        MyChart Information     [employer] gives you secure access to your electronic health record. If you see a primary care provider, you can also send messages to your care team and make appointments. If you have questions, please call your primary care clinic.  If you do not have a primary care provider, please call [phone] and they will assist you.        Care EveryWhere ID     This is your Care EveryWhere ID. This could be used by other organizations to access your [facility]  "records  [id number]        Your Vitals Were     Pulse Temperature Respirations Height Pulse Oximetry BMI (Body Mass Index)    61 97.8  F (36.6  C) (Oral) 16 5' 5.5\" (1.664 m) 98% 19.99 kg/m2       Blood Pressure from Last 3 Encounters:   08/30/18 136/62   08/17/18 128/64   06/04/18 126/70    Weight from Last 3 Encounters:   08/30/18 122 lb (55.3 kg)   06/04/18 127 lb 6.4 oz (57.8 kg)   01/12/18 126 lb 12.8 oz (57.5 kg)              Today, you had the following     No orders found for display       Primary Care Provider Office Phone # Fax #    Ami JULES Tejeda [facility] [phone]       [address] [facility] [address]        Equal Access to Services     [facility]: Hadii aad ku hadasho Soomaali, waaxda luqadaha, qaybta kaalmada adeegyada, waxay osmaniin hayhever carlson . So [facility] [phone].    ATENCIÓN: Si habla español, tiene a lee disposición servicios gratuitos de asistencia lingüística. Suzyjudson al [phone].    We comply with applicable federal civil rights laws and [place] laws. We do not discriminate on the basis of race, color, national origin, age, disability, sex, sexual orientation, or gender identity.            Thank you!     Thank you for choosing [facility]  for your care. Our goal is always to provide you with excellent care. Hearing back from our patients is one way we can continue to improve our services. Please take a few minutes to complete the written survey that you may receive in the mail after your visit with us. Thank you!             Your Updated Medication List - Protect others around you: Learn how to safely use, store and throw away your medicines at www.disposemymeds.org.          This list is accurate as of 8/30/18 11:59 PM.  Always use your most recent med list.                   Brand Name Dispense Instructions for use Diagnosis    calcium carbonate 600 mg-vitamin D 400 units 600-400 MG-UNIT per tablet    CALTRATE     Take 1 " tablet by mouth 2 times daily        glutamine 500 MG capsule      Take 1 capsule (500 mg) by mouth 4 times daily        MAGNESIUM PO           OMEGA 3 PO           UNKNOWN MED DOSAGE      1 tablet Zinc 75        VITAMIN D3 PO      Take by mouth daily        VITAMIN K PO      Take 1 tablet by mouth        [employer] [facility] PO

## 2018-08-30 NOTE — NURSING NOTE
"Chief Complaint   Patient presents with     Establish Care     initial /62  Pulse 61  Temp 97.8  F (36.6  C) (Oral)  Resp 16  Ht 5' 5.5\" (1.664 m)  Wt 122 lb (55.3 kg)  SpO2 98%  BMI 19.99 kg/m2 Estimated body mass index is 19.99 kg/(m^2) as calculated from the following:    Height as of this encounter: 5' 5.5\" (1.664 m).    Weight as of this encounter: 122 lb (55.3 kg)..  bp completed using cuff size regular  JUAN ZELAYA LPN  "

## 2018-08-31 ASSESSMENT — PATIENT HEALTH QUESTIONNAIRE - PHQ9: SUM OF ALL RESPONSES TO PHQ QUESTIONS 1-9: 0

## 2018-09-02 PROBLEM — G25.81 RESTLESS LEGS SYNDROME (RLS): Status: ACTIVE | Noted: 2018-09-02

## 2018-09-02 PROBLEM — R10.13 DYSPEPSIA: Status: ACTIVE | Noted: 2018-09-02

## 2018-09-02 PROBLEM — G47.33 OSA (OBSTRUCTIVE SLEEP APNEA): Status: ACTIVE | Noted: 2018-09-02

## 2018-09-02 PROBLEM — I73.00 RAYNAUD'S DISEASE WITHOUT GANGRENE: Status: ACTIVE | Noted: 2018-09-02

## 2018-09-02 PROBLEM — M81.0 AGE-RELATED OSTEOPOROSIS WITHOUT CURRENT PATHOLOGICAL FRACTURE: Status: ACTIVE | Noted: 2018-09-02

## 2018-09-02 PROBLEM — M72.2 PLANTAR FASCIITIS: Status: ACTIVE | Noted: 2018-09-02

## 2018-09-02 PROBLEM — R55 NEAR SYNCOPE: Status: ACTIVE | Noted: 2018-09-02

## 2018-09-02 PROBLEM — B97.7 HPV (HUMAN PAPILLOMA VIRUS) INFECTION: Status: ACTIVE | Noted: 2018-09-02

## 2018-09-02 PROBLEM — L30.9 ECZEMA, UNSPECIFIED TYPE: Status: ACTIVE | Noted: 2018-09-02

## 2018-09-03 ENCOUNTER — TRANSFERRED RECORDS (OUTPATIENT)
Dept: HEALTH INFORMATION MANAGEMENT | Facility: CLINIC | Age: 73
End: 2018-09-03

## 2018-09-06 ENCOUNTER — ALLIED HEALTH/NURSE VISIT (OUTPATIENT)
Dept: NURSING | Facility: CLINIC | Age: 73
End: 2018-09-06
Payer: COMMERCIAL

## 2018-09-06 DIAGNOSIS — Z23 NEED FOR VACCINATION: Primary | ICD-10-CM

## 2018-09-06 PROCEDURE — 90471 IMMUNIZATION ADMIN: CPT

## 2018-09-06 PROCEDURE — 99207 ZZC NO CHARGE NURSE ONLY: CPT

## 2018-09-06 PROCEDURE — 90715 TDAP VACCINE 7 YRS/> IM: CPT

## 2018-09-14 ENCOUNTER — TELEPHONE (OUTPATIENT)
Dept: FAMILY MEDICINE | Facility: CLINIC | Age: 73
End: 2018-09-14

## 2018-09-14 NOTE — TELEPHONE ENCOUNTER
Panel Management Review      Patient has the following on her problem list: None      Composite cancer screening  Chart review shows that this patient is due/due soon for the following Mammogram  Summary:    Patient is due/failing the following:   MAMMOGRAM    Action needed:   Patient needs referral/order: Mammo    Type of outreach:    Sent [employer] message.    Questions for provider review:    None                                                                                                                                    Meryl Calixto CMA       Chart routed to Care Team .

## 2018-10-29 ENCOUNTER — TELEPHONE (OUTPATIENT)
Dept: FAMILY MEDICINE | Facility: CLINIC | Age: 73
End: 2018-10-29

## 2018-10-29 NOTE — TELEPHONE ENCOUNTER
Panel Management Review      Patient has the following on her problem list: None      Composite cancer screening  Chart review shows that this patient is due/due soon for the following Mammogram  Summary:    Patient is due/failing the following:   MAMMOGRAM    Action needed:   Patient needs referral/order: Mammo    Type of outreach:    Sent [employer] message.    Questions for provider review:    None                                                                                                                                    Meryl Calixto CMA       Chart routed to Care Team .

## 2018-11-06 ENCOUNTER — TELEPHONE (OUTPATIENT)
Dept: INTERNAL MEDICINE | Facility: CLINIC | Age: 73
End: 2018-11-06

## 2018-11-06 NOTE — TELEPHONE ENCOUNTER
Patient calling and requesting that PCP (Dr. Swann) refer her to the [facility].  She is currently seeing a naturopathic doctor for her SIBO (small intenstin bacterial overgrowth).  She is working on having them send her records from that provider to the [place].  That provider is not able to refer since they are not an MD.  Patient is able to self refer, but feels that she would have a better possibility to be seen sooner if she has an MD referral.  Phone number for MD to call [place] is [phone].  Patient states that she did discuss this at her visit in August with Dr. Swann.   Meryl Love RN

## 2018-11-08 NOTE — TELEPHONE ENCOUNTER
OK to leave message on patient's machine per this message. Advised not able to refer to [place] but willing to refer to the  per verbal order Dr. Swann.

## 2018-11-13 ENCOUNTER — TRANSFERRED RECORDS (OUTPATIENT)
Dept: HEALTH INFORMATION MANAGEMENT | Facility: CLINIC | Age: 73
End: 2018-11-13

## 2018-11-27 ENCOUNTER — TRANSFERRED RECORDS (OUTPATIENT)
Dept: HEALTH INFORMATION MANAGEMENT | Facility: CLINIC | Age: 73
End: 2018-11-27

## 2019-03-15 ENCOUNTER — TELEPHONE (OUTPATIENT)
Dept: FAMILY MEDICINE | Facility: CLINIC | Age: 74
End: 2019-03-15

## 2019-03-15 NOTE — LETTER
[facility]  [address] [facility] [address] [facility] [address] [phone]       March 22, 2019    Shaina Calixto  [address] [place] [address]    Dear Shaina,    We care about your health and have reviewed your health plan and are making recommendations based on this review, to optimize your health.    You are in particular need of attention regarding:  -Breast Cancer Screening    We are recommending that you:                                                                                                                                        -schedule a MAMMOGRAM which is due.         1 in 8 women will develop invasive breast cancer during her lifetime and it is the most common non-skin cancer in American women.  EARLY detection, new treatments, and a better understanding of the disease have increased survival rates - the 5 year survival rate in the 1960s was 63% and today it is close to 90% .      If you are under/uninsured, we recommend you contact [facility]. They offer mammograms at no charge or on a sliding fee charge. You can schedule with them at [phone]. Please have them send us the results.          Please disregard this reminder if you have had this exam elsewhere within the last year.  It would be helpful for us to have a copy of your mammogram report in our file so that we can best coordinate your care - please contact us with when your test was done so we can update your record.    In addition, here is a list of due or overdue Health Maintenance reminders.    Health Maintenance Due   Topic Date Due     Zoster (Shingles) Vaccine (1 of 2) 12/07/1996     Discuss Advance Directive Planning  12/07/2001     Annual Wellness Visit  03/26/2016     Mammogram - every 2 years  03/26/2017     Flu Vaccine (1) 09/01/2018     Depression Assessment 3 Months  11/30/2018       To address the above recommendations, we encourage you to contact us at [phone], via [email or web address]  or by contacting Central Scheduling toll free at [phone] 24 hours a day. They will assist you with finding the most convenient time and location.    Thank you for trusting [facility] and we appreciate the opportunity to serve you.  We look forward to supporting your healthcare needs in the future.    Healthy Regards,    Your [facility] Team

## 2019-03-15 NOTE — LETTER
[facility]  [address] [address] [facility] [id number] [phone]       April 4, 2019    Shaina Calixto  [address] [place] [address]    Dear Shaina,    We care about your health and have reviewed your health plan and are making recommendations based on this review, to optimize your health.    You are in particular need of attention regarding:  -Breast Cancer Screening    We are recommending that you:                                                                                                                                        -schedule a MAMMOGRAM which is due.         1 in 8 women will develop invasive breast cancer during her lifetime and it is the most common non-skin cancer in American women.  EARLY detection, new treatments, and a better understanding of the disease have increased survival rates - the 5 year survival rate in the 1960s was 63% and today it is close to 90% .      If you are under/uninsured, we recommend you contact [facility]. They offer mammograms at no charge or on a sliding fee charge. You can schedule with them at [phone]. Please have them send us the results.          Please disregard this reminder if you have had this exam elsewhere within the last year.  It would be helpful for us to have a copy of your mammogram report in our file so that we can best coordinate your care - please contact us with when your test was done so we can update your record.    In addition, here is a list of due or overdue Health Maintenance reminders.    Health Maintenance Due   Topic Date Due     Zoster (Shingles) Vaccine (1 of 2) 12/07/1996     Discuss Advance Directive Planning  12/07/2001     Annual Wellness Visit  03/26/2016     Mammogram - every 2 years  03/26/2017     Flu Vaccine (1) 09/01/2018     Depression Assessment 3 Months  11/30/2018       To address the above recommendations, we encourage you to contact us at [phone], via [employer]  or by contacting Central Scheduling toll free at [phone] 24 hours a day. They will assist you with finding the most convenient time and location.    Thank you for trusting [facility] and we appreciate the opportunity to serve you.  We look forward to supporting your healthcare needs in the future.    Healthy Regards,    Your [facility] Team

## 2019-03-15 NOTE — TELEPHONE ENCOUNTER
Panel Management Review      Patient has the following on her problem list: None      Composite cancer screening  Chart review shows that this patient is due/due soon for the following Mammogram  Summary:    Patient is due/failing the following:   MAMMOGRAM    Action needed:   Patient needs referral/order: mammo    Type of outreach:    Sent [employer] message.    Questions for provider review:    None                                                                                                                                    Meryl Calixto CMA       Chart routed to Care Team .

## 2019-07-09 ENCOUNTER — TELEPHONE (OUTPATIENT)
Dept: INTERNAL MEDICINE | Facility: CLINIC | Age: 74
End: 2019-07-09

## 2019-07-09 ASSESSMENT — PATIENT HEALTH QUESTIONNAIRE - PHQ9: SUM OF ALL RESPONSES TO PHQ QUESTIONS 1-9: 0

## 2019-07-09 NOTE — TELEPHONE ENCOUNTER
Panel Management Review      Patient has the following on her problem list:     Depression / Dysthymia review    Measure:  Needs PHQ-9 score of 4 or less during index window.  Administer PHQ-9 and if score is 5 or more, send encounter to provider for next steps.    5 - 7 month window range: N/A    PHQ-9 SCORE 6/26/2017 6/4/2018 8/30/2018   PHQ-9 Total Score - - -   PHQ-9 Total Score MyChart - - 0   PHQ-9 Total Score 0 1 0       If PHQ-9 recheck is 5 or more, route to provider for next steps.    Patient is due for:  PHQ9      Composite cancer screening  Chart review shows that this patient is due/due soon for the following Mammogram  Summary:    Patient is due/failing the following:   -Mammogram  -PHQ  -Due for routine physical with fasting labs    Action needed:   -Needs to schedule an appointment with Dr. Swann for routine physical with fasting labs. Can do PHQ9 at that time.   -Needs to schedule mammogram.     Type of outreach:    Called and spoke to pt-PHQ9 completed: score=0.    She has also already had her breast cancer screening done-completed breast thermography initially on 6/2018, and then again on 11/2018 (she said they make you go two times when you are initially starting to do thermography, but she will only need to go yearly from now on). Results were normal, completed through Dr. Ashtyn Santos (private practice). Routed to abstraction to update.     She has not had her cholesterol checked recently, but she has had other labs through the [facility]. Advised her we can do JAN to get her previous labs when she is here next time, and that she should plan to have her cholesterol and complete labs completed either through here or her OB/GNY's office-she agrees with plan. If she has these done through her OB, will ask them to send records here.      Questions for provider review:    None                                                                                                                                     Lupe Kruger CMA       Chart not routed.

## 2019-08-19 ENCOUNTER — OFFICE VISIT (OUTPATIENT)
Dept: OPHTHALMOLOGY | Facility: CLINIC | Age: 74
End: 2019-08-19
Attending: OPHTHALMOLOGY
Payer: COMMERCIAL

## 2019-08-19 DIAGNOSIS — H52.13 MYOPIC ASTIGMATISM OF BOTH EYES: ICD-10-CM

## 2019-08-19 DIAGNOSIS — H35.371 EPIRETINAL MEMBRANE, RIGHT: Primary | ICD-10-CM

## 2019-08-19 DIAGNOSIS — H52.4 PRESBYOPIA: ICD-10-CM

## 2019-08-19 DIAGNOSIS — H52.203 MYOPIC ASTIGMATISM OF BOTH EYES: ICD-10-CM

## 2019-08-19 DIAGNOSIS — Z96.1 PSEUDOPHAKIA, BOTH EYES: ICD-10-CM

## 2019-08-19 PROCEDURE — 92015 DETERMINE REFRACTIVE STATE: CPT | Mod: ZF

## 2019-08-19 PROCEDURE — G0463 HOSPITAL OUTPT CLINIC VISIT: HCPCS | Mod: ZF

## 2019-08-19 ASSESSMENT — REFRACTION_MANIFEST
OD_CYLINDER: +0.25
OD_ADD: +2.75
OD_SPHERE: -0.75
OS_CYLINDER: +1.50
OS_ADD: +2.75
OD_AXIS: 030
OS_AXIS: 010
OS_SPHERE: -2.75

## 2019-08-19 ASSESSMENT — VISUAL ACUITY
OS_SC+: +2
OD_SC+: +2
OS_PH_SC+: -1
METHOD: SNELLEN - LINEAR
OS_SC: 20/60
OD_SC: 20/25
OS_PH_SC: 20/20
OD_PH_CC: -0.75

## 2019-08-19 ASSESSMENT — REFRACTION_WEARINGRX
OS_CYLINDER: +1.50
OD_AXIS: 090
SPECS_TYPE: PAL
OD_SPHERE: -0.50
OS_AXIS: 004
OS_CYLINDER: +0.75
OS_SPHERE: -2.00
SPECS_TYPE: READING
OS_ADD: +2.75
OD_AXIS: 176
OS_AXIS: 012
OD_SPHERE: PLANO
OD_CYLINDER: +1.00
OD_CYLINDER: +1.00
OD_ADD: +2.75
OS_SPHERE: PLANO

## 2019-08-19 ASSESSMENT — SLIT LAMP EXAM - LIDS
COMMENTS: NORMAL
COMMENTS: NORMAL

## 2019-08-19 ASSESSMENT — CONF VISUAL FIELD
METHOD: COUNTING FINGERS
OS_NORMAL: 1
OD_NORMAL: 1

## 2019-08-19 ASSESSMENT — TONOMETRY
OS_IOP_MMHG: 16
OD_IOP_MMHG: 16
IOP_METHOD: TONOPEN

## 2019-08-19 ASSESSMENT — CUP TO DISC RATIO
OS_RATIO: 0.2
OD_RATIO: 0.2

## 2019-08-19 ASSESSMENT — EXTERNAL EXAM - LEFT EYE: OS_EXAM: NORMAL

## 2019-08-19 ASSESSMENT — EXTERNAL EXAM - RIGHT EYE: OD_EXAM: NORMAL

## 2019-08-19 NOTE — PROGRESS NOTES
HPI  Shaina Calixto is a 73 year old female here for annual exam. She continues to have a little more trouble with reading compared to last year. No eye pain, redness, discharge. No flashes/floaters.     She has had a few episodes over the past year of transient narrowing of visual field associated with light-headedness, dizziness, and nausea. She had work-up Patient was taken to ED and worked-up, per patient Head and Neck CTA(?MRA) negative for stenosis, cardiac work-up negative. Pt states vision was not affected over last few episodes.     Assessment & Plan      (H35.371) Epiretinal membrane, right eye  (primary encounter diagnosis)  Comment: PVD with ERM. ERM stable and area of traction improved. VA good at 20/20.  Plan: Observe for now.     (Z96.1) Pseudophakia, both eyes  Comment: S/p YAG cap OU by Dr. Arthur.   Plan: Observe    (H52.203) Myopic astigmatism of both eyes  (H52.4) Presbyopia  Comment: Good vision with refraction. Monovision.  Plan: Given updated glasses Rx.   -----------------------------------------------------------------------------------    Patient disposition:   Return in about 1 year (around 8/19/2020). or sooner as needed.    Teaching statement:  Complete documentation of historical and exam elements from today's encounter can be found in the full encounter summary report (not reduplicated in this progress note). I personally obtained the chief complaint(s) and history of present illness.  I confirmed and edited as necessary the review of systems, past medical/surgical history, family history, social history, and examination findings as documented by others; and I examined the patient myself. I personally reviewed the relevant tests, images, and reports as documented above.     I formulated and edited as necessary the assessment and plan and discussed the findings and management plan with the patient and family.    Octavia Marroquin MD  Comprehensive Ophthalmology & Ocular  Pathology  Department of Ophthalmology and Visual Neurosciences  [email or web address]  Pager [phone]

## 2019-08-19 NOTE — NURSING NOTE
Chief Complaints and History of Present Illnesses   Patient presents with     Follow Up     1 year follow up Pseudophakia, both eyes     Chief Complaint(s) and History of Present Illness(es)     Follow Up     Comments: 1 year follow up Pseudophakia, both eyes              Comments     Pt having slight difficulty reading up close without her glasses.  No eye pain today. No new flashes or floaters.  No redness or dryness.    Dl AQUINO August 19, 2019 2:49 PM

## 2019-09-29 ENCOUNTER — HEALTH MAINTENANCE LETTER (OUTPATIENT)
Age: 74
End: 2019-09-29

## 2019-11-08 ENCOUNTER — ALLIED HEALTH/NURSE VISIT (OUTPATIENT)
Dept: FAMILY MEDICINE | Facility: CLINIC | Age: 74
End: 2019-11-08
Payer: COMMERCIAL

## 2019-11-08 VITALS — DIASTOLIC BLOOD PRESSURE: 68 MMHG | SYSTOLIC BLOOD PRESSURE: 108 MMHG

## 2019-11-08 DIAGNOSIS — Z13.6 SCREENING FOR HYPERTENSION: Primary | ICD-10-CM

## 2019-11-08 PROCEDURE — 99207 ZZC NO CHARGE NURSE ONLY: CPT | Performed by: NURSE PRACTITIONER

## 2019-11-08 NOTE — PROGRESS NOTES
Shaina Calixto was evaluated at [facility] on November 8, 2019 at which time her blood pressure was:    BP Readings from Last 3 Encounters:   11/08/19 108/68   08/30/18 136/62   08/17/18 128/64     Pulse Readings from Last 3 Encounters:   08/30/18 61   06/04/18 57   01/12/18 68       Reviewed lifestyle modifications for blood pressure control and reduction: including making healthy food choices, managing weight, getting regular exercise, smoking cessation, reducing alcohol consumption, monitoring blood pressure regularly.     Symptoms: None    BP Goal:< 140/90 mmHg    BP Assessment:  BP at goal    Potential Reasons for BP too high: NA - Not applicable    BP Follow-Up Plan: Recheck BP in 6 months at pharmacy    Recommendation to Provider: checked patient's home monitor and it matched the bp I took.      Note completed by: Geno Madrigal, PharmD  [facility]

## 2019-11-11 ENCOUNTER — TRANSFERRED RECORDS (OUTPATIENT)
Dept: HEALTH INFORMATION MANAGEMENT | Facility: CLINIC | Age: 74
End: 2019-11-11

## 2019-12-17 ASSESSMENT — ENCOUNTER SYMPTOMS
FREQUENCY: 1
SORE THROAT: 0
DIZZINESS: 0
NERVOUS/ANXIOUS: 1
ARTHRALGIAS: 0
EYE PAIN: 0
WEAKNESS: 0
BREAST MASS: 0
HEMATOCHEZIA: 0
MYALGIAS: 1
ABDOMINAL PAIN: 1
NAUSEA: 0
PARESTHESIAS: 0
JOINT SWELLING: 0
CONSTIPATION: 1
HEADACHES: 0
DIARRHEA: 1
SHORTNESS OF BREATH: 0
HEARTBURN: 0
DYSURIA: 0
COUGH: 1
HEMATURIA: 0
FEVER: 0
PALPITATIONS: 0
CHILLS: 0

## 2019-12-17 ASSESSMENT — ACTIVITIES OF DAILY LIVING (ADL): CURRENT_FUNCTION: NO ASSISTANCE NEEDED

## 2019-12-19 ENCOUNTER — OFFICE VISIT (OUTPATIENT)
Dept: INTERNAL MEDICINE | Facility: CLINIC | Age: 74
End: 2019-12-19
Payer: COMMERCIAL

## 2019-12-19 VITALS
HEART RATE: 66 BPM | WEIGHT: 129 LBS | BODY MASS INDEX: 22.02 KG/M2 | TEMPERATURE: 97.9 F | RESPIRATION RATE: 12 BRPM | HEIGHT: 64 IN | OXYGEN SATURATION: 98 % | DIASTOLIC BLOOD PRESSURE: 70 MMHG | SYSTOLIC BLOOD PRESSURE: 120 MMHG

## 2019-12-19 DIAGNOSIS — R25.2 MUSCLE CRAMPS: ICD-10-CM

## 2019-12-19 DIAGNOSIS — Z00.00 ROUTINE GENERAL MEDICAL EXAMINATION AT A HEALTH CARE FACILITY: Primary | ICD-10-CM

## 2019-12-19 DIAGNOSIS — D72.819 LEUKOPENIA, UNSPECIFIED TYPE: ICD-10-CM

## 2019-12-19 PROCEDURE — G0439 PPPS, SUBSEQ VISIT: HCPCS | Performed by: INTERNAL MEDICINE

## 2019-12-19 RX ORDER — CALCIUM CARB/VITAMIN D3/VIT K1 500-500-40
TABLET,CHEWABLE ORAL DAILY
COMMUNITY
End: 2022-03-09

## 2019-12-19 ASSESSMENT — ENCOUNTER SYMPTOMS
DIZZINESS: 0
DIARRHEA: 1
MYALGIAS: 1
NAUSEA: 0
HEMATURIA: 0
WEAKNESS: 0
DYSURIA: 0
FEVER: 0
EYE PAIN: 0
COUGH: 1
CHILLS: 0
HEARTBURN: 0
ABDOMINAL PAIN: 1
CONSTIPATION: 1
HEMATOCHEZIA: 0
SHORTNESS OF BREATH: 0
PALPITATIONS: 0
NERVOUS/ANXIOUS: 1
JOINT SWELLING: 0
BREAST MASS: 0
HEADACHES: 0
PARESTHESIAS: 0
FREQUENCY: 1
ARTHRALGIAS: 0
SORE THROAT: 0

## 2019-12-19 ASSESSMENT — MIFFLIN-ST. JEOR: SCORE: 1075.14

## 2019-12-19 ASSESSMENT — ACTIVITIES OF DAILY LIVING (ADL): CURRENT_FUNCTION: NO ASSISTANCE NEEDED

## 2019-12-19 ASSESSMENT — PATIENT HEALTH QUESTIONNAIRE - PHQ9: SUM OF ALL RESPONSES TO PHQ QUESTIONS 1-9: 0

## 2019-12-19 NOTE — PATIENT INSTRUCTIONS
Plan:  1. If you decide to see hematologist about low white blood cell please let me know and I will make a referral   2. If you have labs at a different doctor please send us the results   3. Please send a report of the thermography to us - fax [phone]  4. Please bring a copy of the Health Directives   5. The following vaccines are recommended for you. Please check with your insurance about coverage.  Some insurances cover better if you have these vaccines at the pharmacy:  -- Prevnar 13 ( pneumonia vaccine)  -- Shingerix vaccine - the newest vaccine for shingles

## 2019-12-19 NOTE — PROGRESS NOTES
Dr Swann's note    Patient's instructions / PLAN:                                                        Plan:  1. If you decide to see hematologist about low white blood cell please let me know and I will make a referral   2. If you have labs at a different doctor please send us the results   3. Please send a report of the thermography to us - fax [phone]  4. Please bring a copy of the Health Directives   5. The following vaccines are recommended for you. Please check with your insurance about coverage.  Some insurances cover better if you have these vaccines at the pharmacy:  -- Prevnar 13 ( pneumonia vaccine)  -- Shingerix vaccine - the newest vaccine for shingles         ASSESSMENT & PLAN:                                                      (Z00.00) Routine general medical examination at a health care facility  (primary encounter diagnosis)  Comment: No labs today, prefers getting them with a different doctor  Plan:     (K76.790) Leukopenia, unspecified type  Comment: Patient will call if interested in following up with heme/onc to discuss further  Plan:     (R25.2) Muscle cramps  Comment: Takes multiple supplements. Discussed that I cannot test assess interactions between her supplements or assess if they are contributing to symptoms   Plan:     Chief Complaint:                                                      WELLNESS and questions   Annual exam  Follow up chronic medical problems      SUBJECTIVE:                                                    History of present illness     We reviewed the chronic medical problems as above.   I reviewed the recent tests results in Epic     --Patient is thinking of seeing Dr. Reynoso again, last seen in 2015  --2015 labs with Dr. Reynoso: , CRP normal, homocysteine normal, CNP normal, magnesium normal, TSH normal, CBC normal, AVERY negative  --She takes a lot a lot of supplements   --Notes that she's been attending various online webinars   -- no labs today  She prefers to do labs with a different doctor    Leukopenia  --She's noticed low normal WBC over the years  --WBC 2016 4.3, 2017 3.2, 2017 3.9, 2018 4.1, 2018 4.7, 10/2019 3.8  --She mentioned this to her GI  --Was told low fiber could be contributing   --Discussed that historically WBC is low normal for her and I do not think following up with heme/onc is necessary    Muscle cramps   --In lower legs, thighs, and groin area  --She is taking various supplements, including magnesium, as a form of alternative medicine  --Switched to magnesium thionate due to diarrhea with regular magnesium   --  Takes Mg and vit D 5000      Depression  --She was on anti-depressants for 20 years   --Has questions about it's effects on dopamine production    --Mother and cousin have Parkinson's and she worries about this   --Discussed that our literature does not indicate there is a correlation between anti-depressants and Parkinson's        ROS:   See below    This document serves as a record of the services and decisions personally performed and made by Chelsea Swann MD. It was created on their behalf by Arina Flores, a trained medical scribe. The creation of this document is based on the provider's statements to the medical scribe.  Arina Flores December 19, 2019 10:46 AM      PMHx: - reviewed  Past Medical History:   Diagnosis Date     Ankylosing spondylitis (H)      HLAB19 +     Depressive disorder college, young adult    resolved for many years     Eosinophilic cellulitis      Mild major depression (H)      Osteopenia     -2.1; Lumbar -2.5; Fem Neck      Small intestinal bacterial overgrowth 05/2017    has had for years but finally diagnosed correctly May 2017     Vitamin deficiency        PSHx: reviewed  Past Surgical History:   Procedure Laterality Date     ARTHROSCOPY KNEE RT/LT Right 2004     BIOPSY  Dec. 2016    spongiotic dermatitis     COLONOSCOPY  May 2015    normal     FOOT SURGERY Right 1995    due to foot injury      FOOT SURGERY Right 2000    ayers's neuroma     LASER YAG CAPSULOTOMY Bilateral 12/29/15     PHACOEMULSIFICATION CLEAR CORNEA WITH STANDARD INTRAOCULAR LENS IMPLANT Right 10/1/14     PHACOEMULSIFICATION CLEAR CORNEA WITH STANDARD INTRAOCULAR LENS IMPLANT Left 10/08/14     SOFT TISSUE SURGERY  1995    multiple crush & fx's to right foot        Soc Hx: No daily alcohol, no smoking  Social History     Socioeconomic History     Marital status:      Spouse name: Not on file     Number of children: Not on file     Years of education: Not on file     Highest education level: Not on file   Occupational History     Not on file   Social Needs     Financial resource strain: Not on file     Food insecurity:     Worry: Not on file     Inability: Not on file     Transportation needs:     Medical: Not on file     Non-medical: Not on file   Tobacco Use     Smoking status: Never Smoker     Smokeless tobacco: Never Used   Substance and Sexual Activity     Alcohol use: Yes     Comment: once every several months     Drug use: No     Sexual activity: Never   Lifestyle     Physical activity:     Days per week: Not on file     Minutes per session: Not on file     Stress: Not on file   Relationships     Social connections:     Talks on phone: Not on file     Gets together: Not on file     Attends Rastafari service: Not on file     Active member of club or organization: Not on file     Attends meetings of clubs or organizations: Not on file     Relationship status: Not on file     Intimate partner violence:     Fear of current or ex partner: Not on file     Emotionally abused: Not on file     Physically abused: Not on file     Forced sexual activity: Not on file   Other Topics Concern     Parent/sibling w/ CABG, MI or angioplasty before 65F 55M? No   Social History Narrative    How much exercise per week? Walking    How much calcium per day? Boron and dairy       How much caffeine per day? 1-2 cups coffee    How much vitamin D  per day? supplement    Do you/your family wear seatbelts?  Yes    Do you/your family use safety helmets? Yes    Do you/your family use sunscreen? Yes    Do you/your family keep firearms in the home? No    Do you/your family have a smoke detector(s)? Yes        Do you feel safe in your home? Yes    Has anyone ever touched you in an unwanted manner? No     Explain          March 26, 2015 Maria G Cardenas LPN                Fam Hx: reviewed  Family History   Problem Relation Age of Onset     Endometrial Cancer Mother 73        only endometrial hyperplasia     Other - See Comments Mother         TIA     Parkinsonism Mother      Hypertension Mother      Thyroid Disease Mother      C.A.D. Maternal Grandfather      Cancer Father         AML     Other Cancer Father      Other Cancer Maternal Half-Brother      Other Cancer Cousin      Other Cancer Paternal Half-Brother      Glaucoma No family hx of      Diabetes No family hx of      Macular Degeneration No family hx of          Screening: reviewed      All: reviewed    Meds: reviewed  Current Outpatient Medications   Medication Sig Dispense Refill     acetylcysteine (N-ACETYL CYSTEINE) 600 MG CAPS capsule Take by mouth daily       Alpha-Lipoic Acid 200 MG TABS Take by mouth 2 times daily       cholecalciferol (VITAMIN D3) 125 MCG (5000 UT) TABS tablet Take by mouth daily       GLYCINE PO Take 1 g by mouth daily       Milk Thistle-Turmeric (SILYMARIN PO) Take 170 mg by mouth 3 times daily       Multiple Vitamins-Minerals (MULTIVITAMIN ADULT PO) Take by mouth daily       Omega-3 Fatty Acids (OMEGA 3 PO)        Omega-3 Fatty Acids (OMEGA-3 CF PO) Take 1,280 mg by mouth daily       Taurine 1000 MG CAPS Take by mouth daily       UNABLE TO FIND MEDICATION NAME: pepcix,75 mg 1 qd       UNABLE TO FIND MEDICATION NAME: aller dhq -1 qd       UNABLE TO FIND MEDICATION NAME: curcu plex  mg-2 qd       UNABLE TO FIND MEDICATION NAME: ocamine oxidase(histamine block), 4.2 mg, prn    "      OBJECTIVE:                                                    Physical Exam :  Blood pressure 120/70, pulse 66, temperature 97.9  F (36.6  C), temperature source Oral, resp. rate 12, height 1.626 m (5' 4\"), weight 58.5 kg (129 lb), SpO2 98 %, not currently breastfeeding.     NAD, appears comfortable  Skin clear, no rashes  HEENT: PERRLA, EOMI, anicteric sclera, pink conjunctiva, external ears appear normal, bilateral tympanic membranes clinically normal, oropharynx normal color.  Neck: supple, no JVD,  no thyroidmegaly  Lymph nodes non palpable in the cervical, supraclavicular axillaries, inguinal areas  Chest: clear to auscultation with good respiratory effort  Cardiac: S1S2, RRR, no mgr appreciated  Abdomen: soft, not tender, not distended, audible bowel sound, no hepatosplenomegaly, no palpable masses, no abdominal bruits  Extremities: no cyanosis, clubbing or edema.   Neuro: A, Ox3, no focal signs.  Breast exam in supine and erect position: they are symmetrical, no skin changes, no tenderness or nodes on palpation. Nipples are erect, no skin lesions, no discharge on pressure.    Pelvic exam: deferred, s/p menopause, no symptoms, no hx of abnormal pap     The information in this document, created by the medical scribe for me, accurately reflects the services I personally performed and the decisions made by me. I have reviewed and approved this document for accuracy prior to leaving the patient care area.  December 19, 2019     Chelsea Swann MD  Internal Medicine         SUBJECTIVE:   Shaina Calixto is a 74 year old female who presents for Preventive Visit.    Are you in the first 12 months of your Medicare coverage?  No    Healthy Habits:     In general, how would you rate your overall health?  Excellent    Frequency of exercise:  1 day/week    Duration of exercise:  15-30 minutes    Do you usually eat at least 4 servings of fruit and vegetables a day, include whole grains    & fiber and avoid regularly " "eating high fat or \"junk\" foods?  Yes    Taking medications regularly:  Not Applicable    Medication side effects:  Not applicable    Ability to successfully perform activities of daily living:  No assistance needed    Home Safety:  No safety concerns identified    Hearing Impairment:  No hearing concerns    In the past 6 months, have you been bothered by leaking of urine?  No    In general, how would you rate your overall mental or emotional health?  Excellent      PHQ-2 Total Score: 0    Additional concerns today:  Yes    Do you feel safe in your environment? Yes    Have you ever done Advance Care Planning? (For example, a Health Directive, POLST, or a discussion with a medical provider or your loved ones about your wishes): Yes, patient states has an Advance Care Planning document and will bring a copy to the clinic.      Fall risk  Fallen 2 or more times in the past year?: No  Any fall with injury in the past year?: No    Cognitive Screening   1) Repeat 3 items (Leader, Season, Table)    2) Clock draw: NORMAL  3) 3 item recall:   Recalls 3 objects  Results: 3 items recalled: COGNITIVE IMPAIRMENT LESS LIKELY    Mini-CogTM Copyright JEANNIE Beckett. Licensed by the author for use in [facility]; reprinted with permission (selene@[facility]). All rights reserved.      Do you have sleep apnea, excessive snoring or daytime drowsiness?: no    Reviewed and updated as needed this visit by clinical staff  Tobacco  Med Hx  Surg Hx  Fam Hx  Soc Hx        Reviewed and updated as needed this visit by Provider        Social History     Tobacco Use     Smoking status: Never Smoker     Smokeless tobacco: Never Used   Substance Use Topics     Alcohol use: Yes     Comment: once every several months     If you drink alcohol do you typically have >3 drinks per day or >7 drinks per week? No    Alcohol Use 12/17/2019   Prescreen: >3 drinks/day or >7 drinks/week? No     Current providers sharing in care for this patient include: "   Patient Care Team:  Ami Aguilar Ra, APRN CNP as PCP - General (Family Practice)  Jennifer Valero MD as MD (Family Practice)  Octavia Marroquin MD as MD (Ophthalmology)  Ami Aguilar Ra, APRN CNP as Assigned PCP    The following health maintenance items are reviewed in Epic and correct as of today:  Health Maintenance   Topic Date Due     ADVANCE CARE PLANNING  1945     ZOSTER IMMUNIZATION (1 of 2) 12/07/1996     PNEUMOCOCCAL IMMUNIZATION 65+ LOW/MEDIUM RISK (2 of 2 - PCV13) 01/26/2015     MEDICARE ANNUAL WELLNESS VISIT  03/26/2016     MAMMO SCREENING  03/26/2017     INFLUENZA VACCINE (1) 09/01/2019     PHQ-9  10/09/2019     FALL RISK ASSESSMENT  08/19/2020     LIPID  06/27/2022     COLONOSCOPY  05/01/2025     DTAP/TDAP/TD IMMUNIZATION (15 - Td) 09/06/2028     DEXA  Completed     HEPATITIS C SCREENING  Completed     DEPRESSION ACTION PLAN  Completed     IPV IMMUNIZATION  Aged Out     MENINGITIS IMMUNIZATION  Aged Out     Labs reviewed in EPIC    Review of Systems   Constitutional: Negative for chills and fever.   HENT: Negative for congestion, ear pain, hearing loss and sore throat.    Eyes: Negative for pain and visual disturbance.   Respiratory: Positive for cough. Negative for shortness of breath.    Cardiovascular: Negative for chest pain, palpitations and peripheral edema.   Gastrointestinal: Positive for abdominal pain, constipation and diarrhea. Negative for heartburn, hematochezia and nausea.   Breasts:  Negative for tenderness, breast mass and discharge.   Genitourinary: Positive for frequency and urgency. Negative for dysuria, genital sores, hematuria, pelvic pain, vaginal bleeding and vaginal discharge.   Musculoskeletal: Positive for myalgias. Negative for arthralgias and joint swelling.   Skin: Positive for rash.   Neurological: Negative for dizziness, weakness, headaches and paresthesias.   Psychiatric/Behavioral: Negative for mood changes. The patient is nervous/anxious.   "        COUNSELING:  Reviewed preventive health counseling, as reflected in patient instructions       Regular exercise       Healthy diet/nutrition    Estimated body mass index is 19.99 kg/m  as calculated from the following:    Height as of 8/30/18: 1.664 m (5' 5.5\").    Weight as of 8/30/18: 55.3 kg (122 lb).         reports that she has never smoked. She has never used smokeless tobacco.      Appropriate preventive services were discussed with this patient, including applicable screening as appropriate for cardiovascular disease, diabetes, osteopenia/osteoporosis, and glaucoma.  As appropriate for age/gender, discussed screening for colorectal cancer, prostate cancer, breast cancer, and cervical cancer. Checklist reviewing preventive services available has been given to the patient.    Reviewed patients plan of care and provided an AVS. The Basic Care Plan (routine screening as documented in Health Maintenance) for Shaina meets the Care Plan requirement. This Care Plan has been established and reviewed with the Patient.    Counseling Resources:  ATP IV Guidelines  Pooled Cohorts Equation Calculator  Breast Cancer Risk Calculator  FRAX Risk Assessment  ICSI Preventive Guidelines  Dietary Guidelines for Americans, 2010  USDA's MyPlate  ASA Prophylaxis  Lung CA Screening    Chelsea Cornelius MD  [facility]    Identified Health Risks:  "

## 2020-02-05 NOTE — PROGRESS NOTES
Subjective     Shaina Calixto is a 74 year old female who presents to clinic today for the following health issues:    HPI   Patient is coming in to have labs ordered.    She has been experiencing more fatigue.  Continues to work with nutritionist, naturopath, as well as other providers.  There are labs that they would like to have done today and she is hoping to have them done through us.    She has a hx of anemia.  No current signs of bleeding, no palpitations.  Continues to work with provider r/t SIBO.  She would like iron studies done today.    She is working with someone r/t osteoporosis.  Would like a urine test completed today.    She would like vitamin D tested.    She would also like a specific stool test done per her nutritionist.  This is done through a specific lab, but sounds like I could order it.  Nutritionist can then use it to make diet recommendations.    Patient Active Problem List   Diagnosis     Breast cancer screening     Mild major depression (H)     Yeast dermatitis     Stress     Urticaria     CARDIOVASCULAR SCREENING; LDL GOAL LESS THAN 160     High risk HPV infection     Multiple nevi     Cherry angioma     Screening for skin cancer     Restless legs syndrome (RLS)     Plantar fasciitis     HPV (human papilloma virus) infection     Raynaud's disease without gangrene     SABRA (obstructive sleep apnea)     Age-related osteoporosis without current pathological fracture     Eczema, unspecified type     Dyspepsia     Near syncope     Past Surgical History:   Procedure Laterality Date     ARTHROSCOPY KNEE RT/LT Right 2004     BIOPSY  Dec. 2016    spongiotic dermatitis     COLONOSCOPY  May 2015    normal     FOOT SURGERY Right 1995    due to foot injury     FOOT SURGERY Right 2000    ayers's neuroma     LASER YAG CAPSULOTOMY Bilateral 12/29/15     PHACOEMULSIFICATION CLEAR CORNEA WITH STANDARD INTRAOCULAR LENS IMPLANT Right 10/1/14     PHACOEMULSIFICATION CLEAR CORNEA WITH STANDARD INTRAOCULAR  "LENS IMPLANT Left 10/08/14     SOFT TISSUE SURGERY  1995    multiple crush & fx's to right foot       Social History     Tobacco Use     Smoking status: Never Smoker     Smokeless tobacco: Never Used   Substance Use Topics     Alcohol use: Yes     Comment: once every several months     Family History   Problem Relation Age of Onset     Endometrial Cancer Mother 73        only endometrial hyperplasia     Other - See Comments Mother         TIA     Parkinsonism Mother      Hypertension Mother      Thyroid Disease Mother      C.A.D. Maternal Grandfather      Cancer Father         AML     Other Cancer Father      Other Cancer Maternal Half-Brother      Other Cancer Cousin      Other Cancer Paternal Half-Brother      Glaucoma No family hx of      Diabetes No family hx of      Macular Degeneration No family hx of              Reviewed and updated as needed this visit by Provider         Review of Systems   SEE HPI.      Objective    /68 (BP Location: Right arm, Patient Position: Chair, Cuff Size: Adult Regular)   Pulse 50   Temp 97.6  F (36.4  C) (Oral)   Resp 16   Ht 1.626 m (5' 4\")   Wt 60.3 kg (132 lb 14.4 oz)   LMP  (LMP Unknown)   SpO2 96%   BMI 22.81 kg/m    Body mass index is 22.81 kg/m .  Physical Exam   GENERAL: healthy, alert and no distress  PSYCH: mentation appears normal, affect normal/bright    Diagnostic Test Results:  Labs reviewed in Epic        Assessment & Plan     1. Fatigue, unspecified type  - Magnesium  - Vitamin D Deficiency  - CBC with platelets  - Ferritin  - Iron and iron binding capacity    2. Screening for osteoporosis  - N telopeptide cross linked urine    3. Vitamin D deficiency, unspecified   - Vitamin D Deficiency    4. Intestinal malabsorption, unspecified type   - Ferritin    5. Other intestinal malabsorption   - Iron and iron binding capacity      No follow-ups on file.    JULES Gillette Ra [facility]      "

## 2020-02-06 ENCOUNTER — OFFICE VISIT (OUTPATIENT)
Dept: FAMILY MEDICINE | Facility: CLINIC | Age: 75
End: 2020-02-06
Payer: COMMERCIAL

## 2020-02-06 VITALS
SYSTOLIC BLOOD PRESSURE: 124 MMHG | BODY MASS INDEX: 22.69 KG/M2 | OXYGEN SATURATION: 96 % | HEIGHT: 64 IN | RESPIRATION RATE: 16 BRPM | TEMPERATURE: 97.6 F | DIASTOLIC BLOOD PRESSURE: 68 MMHG | WEIGHT: 132.9 LBS | HEART RATE: 50 BPM

## 2020-02-06 DIAGNOSIS — K90.9 INTESTINAL MALABSORPTION, UNSPECIFIED TYPE: ICD-10-CM

## 2020-02-06 DIAGNOSIS — E55.9 VITAMIN D DEFICIENCY, UNSPECIFIED: ICD-10-CM

## 2020-02-06 DIAGNOSIS — K90.89 OTHER INTESTINAL MALABSORPTION: ICD-10-CM

## 2020-02-06 DIAGNOSIS — R53.83 FATIGUE, UNSPECIFIED TYPE: Primary | ICD-10-CM

## 2020-02-06 DIAGNOSIS — Z13.820 SCREENING FOR OSTEOPOROSIS: ICD-10-CM

## 2020-02-06 LAB
DEPRECATED CALCIDIOL+CALCIFEROL SERPL-MC: 64 UG/L (ref 20–75)
ERYTHROCYTE [DISTWIDTH] IN BLOOD BY AUTOMATED COUNT: 12.5 % (ref 10–15)
FERRITIN SERPL-MCNC: 32 NG/ML (ref 8–252)
HCT VFR BLD AUTO: 42.2 % (ref 35–47)
HGB BLD-MCNC: 14.2 G/DL (ref 11.7–15.7)
IRON SATN MFR SERPL: 21 % (ref 15–46)
IRON SERPL-MCNC: 75 UG/DL (ref 35–180)
MAGNESIUM SERPL-MCNC: 2.2 MG/DL (ref 1.6–2.3)
MCH RBC QN AUTO: 33.2 PG (ref 26.5–33)
MCHC RBC AUTO-ENTMCNC: 33.6 G/DL (ref 31.5–36.5)
MCV RBC AUTO: 99 FL (ref 78–100)
PLATELET # BLD AUTO: 260 10E9/L (ref 150–450)
RBC # BLD AUTO: 4.28 10E12/L (ref 3.8–5.2)
TIBC SERPL-MCNC: 349 UG/DL (ref 240–430)
WBC # BLD AUTO: 4.5 10E9/L (ref 4–11)

## 2020-02-06 PROCEDURE — 99213 OFFICE O/P EST LOW 20 MIN: CPT | Performed by: NURSE PRACTITIONER

## 2020-02-06 PROCEDURE — 82306 VITAMIN D 25 HYDROXY: CPT | Performed by: NURSE PRACTITIONER

## 2020-02-06 PROCEDURE — 83540 ASSAY OF IRON: CPT | Performed by: NURSE PRACTITIONER

## 2020-02-06 PROCEDURE — 83550 IRON BINDING TEST: CPT | Performed by: NURSE PRACTITIONER

## 2020-02-06 PROCEDURE — 36415 COLL VENOUS BLD VENIPUNCTURE: CPT | Performed by: NURSE PRACTITIONER

## 2020-02-06 PROCEDURE — 99000 SPECIMEN HANDLING OFFICE-LAB: CPT | Performed by: NURSE PRACTITIONER

## 2020-02-06 PROCEDURE — 85027 COMPLETE CBC AUTOMATED: CPT | Performed by: NURSE PRACTITIONER

## 2020-02-06 PROCEDURE — 82523 COLLAGEN CROSSLINKS: CPT | Mod: 90 | Performed by: NURSE PRACTITIONER

## 2020-02-06 PROCEDURE — 82728 ASSAY OF FERRITIN: CPT | Performed by: NURSE PRACTITIONER

## 2020-02-06 PROCEDURE — 83735 ASSAY OF MAGNESIUM: CPT | Performed by: NURSE PRACTITIONER

## 2020-02-06 ASSESSMENT — MIFFLIN-ST. JEOR: SCORE: 1092.83

## 2020-02-07 LAB
COLLAGEN NTX/CREAT UR-SRTO: 32 BCE/MM
CREAT UR-MCNC: 38 MG/DL

## 2020-02-21 ENCOUNTER — TELEPHONE (OUTPATIENT)
Dept: FAMILY MEDICINE | Facility: CLINIC | Age: 75
End: 2020-02-21

## 2020-02-21 NOTE — TELEPHONE ENCOUNTER
Reason for Call:  Other call back    Detailed comments: Shaina wants to know if Ami OWENS has ever heard of the GI Map test and what does she know about it. Also wants to know if you have been notified of acceptance of her application.     Phone Number Patient can be reached at: Home number on file [phone] (home)    Best Time: any    Can we leave a detailed message on this number? YES    Call taken on 2/21/2020 at 8:55 AM by Shiresa H. Ormond

## 2020-03-06 ENCOUNTER — TELEPHONE (OUTPATIENT)
Dept: FAMILY MEDICINE | Facility: CLINIC | Age: 75
End: 2020-03-06

## 2020-03-06 DIAGNOSIS — Z13.6 CARDIOVASCULAR SCREENING; LDL GOAL LESS THAN 160: ICD-10-CM

## 2020-03-06 DIAGNOSIS — Z13.1 SCREENING FOR DIABETES MELLITUS: Primary | ICD-10-CM

## 2020-03-06 NOTE — TELEPHONE ENCOUNTER
Reason for call:  Order   Order or referral being requested: Order for following labs:  Total cholestore  HDL  LDL  Triglyceride values  Fasting glucose  A1C    Reason for request: Pt stated last time this labs where completed was about 2 yrs ago.    Date needed: as soon as possible  Has the patient been seen by the PCP for this problem? Not Applicable    Additional comments: Pt would like a call back when orders are placed to schedule a lab appt.    Phone number to reach patient:  Home number on file [phone] (home)    Best Time:  ANY TIME    Can we leave a detailed message on this number?  YES    Travel screening: Not Applicable

## 2020-03-10 NOTE — TELEPHONE ENCOUNTER
Pt called back she called billing was told that test is 31.00 and 12 for lab fees she still wants to tony johnson and have the test done

## 2020-03-10 NOTE — TELEPHONE ENCOUNTER
Called patient LVM to check with her insurance but that the A1C that she requested would not be covered and to please call us back with questions

## 2020-03-13 DIAGNOSIS — Z13.6 CARDIOVASCULAR SCREENING; LDL GOAL LESS THAN 160: ICD-10-CM

## 2020-03-13 LAB
ALBUMIN SERPL-MCNC: 3.2 G/DL (ref 3.4–5)
ALP SERPL-CCNC: 94 U/L (ref 40–150)
ALT SERPL W P-5'-P-CCNC: 44 U/L (ref 0–50)
ANION GAP SERPL CALCULATED.3IONS-SCNC: 3 MMOL/L (ref 3–14)
AST SERPL W P-5'-P-CCNC: 22 U/L (ref 0–45)
BILIRUB SERPL-MCNC: 0.6 MG/DL (ref 0.2–1.3)
BUN SERPL-MCNC: 18 MG/DL (ref 7–30)
CALCIUM SERPL-MCNC: 8.9 MG/DL (ref 8.5–10.1)
CHLORIDE SERPL-SCNC: 106 MMOL/L (ref 94–109)
CHOLEST SERPL-MCNC: 206 MG/DL
CO2 SERPL-SCNC: 30 MMOL/L (ref 20–32)
CREAT SERPL-MCNC: 0.94 MG/DL (ref 0.52–1.04)
GFR SERPL CREATININE-BSD FRML MDRD: 60 ML/MIN/{1.73_M2}
GLUCOSE SERPL-MCNC: 91 MG/DL (ref 70–99)
HDLC SERPL-MCNC: 89 MG/DL
LDLC SERPL CALC-MCNC: 105 MG/DL
NONHDLC SERPL-MCNC: 117 MG/DL
POTASSIUM SERPL-SCNC: 4.3 MMOL/L (ref 3.4–5.3)
PROT SERPL-MCNC: 6.4 G/DL (ref 6.8–8.8)
SODIUM SERPL-SCNC: 139 MMOL/L (ref 133–144)
TRIGL SERPL-MCNC: 58 MG/DL

## 2020-03-13 PROCEDURE — 36415 COLL VENOUS BLD VENIPUNCTURE: CPT | Performed by: NURSE PRACTITIONER

## 2020-03-13 PROCEDURE — 80053 COMPREHEN METABOLIC PANEL: CPT | Performed by: NURSE PRACTITIONER

## 2020-03-13 PROCEDURE — 80061 LIPID PANEL: CPT | Performed by: NURSE PRACTITIONER

## 2020-03-16 ENCOUNTER — TELEPHONE (OUTPATIENT)
Dept: FAMILY MEDICINE | Facility: CLINIC | Age: 75
End: 2020-03-16

## 2020-03-16 NOTE — TELEPHONE ENCOUNTER
Test Results  Who is calling?:  Shaina Calixto  Where was the test performed:  [facility]  What are your questions/concerns?:  Had a GI-MAT completed, pt is requesting the results emailed to address on file rather than coming in to the office. Please call pt with any questions or concerns.  Okay to leave a detailed message?:  Yes at [phone]

## 2020-03-16 NOTE — TELEPHONE ENCOUNTER
Called patient explained that she needs to call them to get a copy and that that we don't email patient information at this time

## 2020-05-26 ENCOUNTER — TELEPHONE (OUTPATIENT)
Dept: OPHTHALMOLOGY | Facility: CLINIC | Age: 75
End: 2020-05-26

## 2020-05-26 NOTE — TELEPHONE ENCOUNTER
I spoke to the patient who notes that she has redness on her RLL and in the lower part of her eye.  She notes that it started with a cyst over two weeks ago .  She notices some itching but she hasn't touched her eye.  She notes it looks red now.  She has blurry or changed vision as well. It is difficult for her to describe the vision.  She has been using Systane artificial tears and warm compresses.  Message routed to Resident MD.

## 2020-05-26 NOTE — TELEPHONE ENCOUNTER
[facility] Call Center    Phone Message    May a detailed message be left on voicemail: yes     Reason for Call: Symptoms or Concerns     If patient has red-flag symptoms, warm transfer to triage line    Current symptom or concern: Something on right eye, red spot, looks like a pimple on lid and is affecting her vision.  When she lifts her eye lid, the eye doesn't look right.    Symptoms have been present for:  2-3 week(s)    Has patient previously been seen for this? No    By :    Date:     Are there any new or worsening symptoms? No      Action Taken: Message routed to:  [facility]  Eye    Travel Screening: Not Applicable

## 2020-05-26 NOTE — TELEPHONE ENCOUNTER
Called and discussed with patient. Description from patient most consistent with RLL chalazion. Patient unable to send images. No involvement of the eye and no visual changes. Discussed hot compresses BID to start. Patient to call if not improving with time. All questions answered.    Bruce Leary MD  Ophthalmology Resident, PGY-3

## 2020-05-28 NOTE — PROGRESS NOTES
HPI:  Patient presents for bump in the RLL for 3 weeks. There is mild itch but no pain. Patient tried systane and warm compress which does not help.       Pertinent Medical History:    Obstructive Sleep Apnea    Cherry Angioma    Near syncope        Ocular History:    Epiretinal Membrane, right eye    Pseudophakia both eyes    Eye Medications:    Allergic to cephalexin, penicillins, Moxifloxacin, and triamcinolone.    Assessment and Plan:  1.   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     There is also underlying blepharitis both eyes. Clean lids and lashes with water and baby shampoo twice daily.     Patient mentioned rash with corticosteroids. Though she would like to try lotemax BID inside the eye and on eyelids - she will stop if there is a reaction. Side effects of steroids discussed.     Warm compress every 2 hours right eye.    Monitor in 1 week with Dr. Marroquin.      2.   Epiretinal Membrane, right eye    Monitor.     3.   Pseudophakia, both eyes    Monovision.     Right eye distance.    Left eye near.      Medical History:  Past Medical History:   Diagnosis Date     Ankylosing spondylitis (H)      HLAB19 +     Depressive disorder college, young adult    resolved for many years     Eosinophilic cellulitis      Mild major depression (H)      Osteopenia     -2.1; Lumbar -2.5; Fem Neck      Small intestinal bacterial overgrowth 05/2017    has had for years but finally diagnosed correctly May 2017     Vitamin deficiency        Medications:  Current Outpatient Medications   Medication Sig Dispense Refill     acetylcysteine (N-ACETYL CYSTEINE) 600 MG CAPS capsule Take by mouth daily       Alpha-Lipoic Acid 200 MG TABS Take by mouth 2 times daily       cholecalciferol (VITAMIN D3) 125 MCG (5000 UT) TABS tablet Take by mouth daily       GLYCINE PO Take 1 g by mouth daily       Menaquinone-7 (VITAMIN K2 PO)        Milk Thistle-Turmeric (SILYMARIN PO) Take 170 mg by mouth 3 times daily       Multiple  Vitamins-Minerals (MULTIVITAMIN ADULT PO) Take by mouth daily       Omega-3 Fatty Acids (OMEGA 3 PO)        Omega-3 Fatty Acids (OMEGA-3 CF PO) Take 1,280 mg by mouth daily       Probiotic Product (ULTRAFLORA IMMUNE HEALTH PO) Take by mouth daily       Taurine 1000 MG CAPS Take by mouth daily       UNABLE TO FIND MEDICATION NAME: pepcix,75 mg 1 qd       UNABLE TO FIND MEDICATION NAME: aller dhq -1 qd       UNABLE TO FIND MEDICATION NAME: curcu plex  mg-2 qd       UNABLE TO FIND MEDICATION NAME: ocamine oxidase(histamine block), 4.2 mg, prn       UNABLE TO FIND MEDICATION NAME: TH 2 modutator, 1-2 bid       UNABLE TO FIND MEDICATION NAME: glutaloemine, bid       UNABLE TO FIND MEDICATION NAME: magnesium l threonate, 1 scoop bid       UNABLE TO FIND MEDICATION NAME: pepti guard       UNABLE TO FIND MEDICATION NAME: [facility]       UNABLE TO FIND MEDICATION NAME: reyene max liquid choline 120 mg & solicon 6 mg-6 drops       UNABLE TO FIND MEDICATION NAME: IGG CWP 2000, 5 gram immunoglobin     Complete documentation of historical and exam elements from today's encounter can be found in the full encounter summary report (not reduplicated in this progress note). I personally obtained the chief complaint(s) and history of present illness.  I confirmed and edited as necessary the review of systems, past medical/surgical history, family history, social history, and examination findings as documented by others; and I examined the patient myself. I personally reviewed the relevant tests, images, and reports as documented above. I formulated and edited as necessary the assessment and plan and discussed the findings and management plan with the patient and family. - Jerry Hairston, OD      Addendum:  Insurance does not cover lotemax. Start FML BID right eye for 7 days.

## 2020-05-29 ENCOUNTER — OFFICE VISIT (OUTPATIENT)
Dept: OPHTHALMOLOGY | Facility: CLINIC | Age: 75
End: 2020-05-29
Attending: OPTOMETRIST
Payer: COMMERCIAL

## 2020-05-29 ENCOUNTER — TELEPHONE (OUTPATIENT)
Dept: OPHTHALMOLOGY | Facility: CLINIC | Age: 75
End: 2020-05-29

## 2020-05-29 DIAGNOSIS — H11.221 PYOGENIC GRANULOMA OF CONJUNCTIVA, RIGHT: Primary | ICD-10-CM

## 2020-05-29 DIAGNOSIS — H35.371 EPIRETINAL MEMBRANE (ERM) OF RIGHT EYE: ICD-10-CM

## 2020-05-29 DIAGNOSIS — Z96.1 PSEUDOPHAKIA OF BOTH EYES: ICD-10-CM

## 2020-05-29 PROCEDURE — G0463 HOSPITAL OUTPT CLINIC VISIT: HCPCS | Mod: ZF

## 2020-05-29 ASSESSMENT — TONOMETRY
OD_IOP_MMHG: 15
IOP_METHOD: ICARE
OS_IOP_MMHG: 13

## 2020-05-29 ASSESSMENT — VISUAL ACUITY
OD_SC+: -2
OD_SC: 20/30
OS_SC+: -1
OD_PH_SC+: -1
OD_PH_SC: 20/20
METHOD: SNELLEN - LINEAR
OS_SC: 20/80
OS_PH_SC: 20/25

## 2020-05-29 ASSESSMENT — SLIT LAMP EXAM - LIDS: COMMENTS: BLEPHARITIS

## 2020-05-29 ASSESSMENT — EXTERNAL EXAM - RIGHT EYE: OD_EXAM: NORMAL

## 2020-05-29 ASSESSMENT — CONF VISUAL FIELD
METHOD: COUNTING FINGERS
OS_NORMAL: 1
OD_NORMAL: 1

## 2020-05-29 ASSESSMENT — CUP TO DISC RATIO
OS_RATIO: 0.2
OD_RATIO: 0.2

## 2020-05-29 ASSESSMENT — EXTERNAL EXAM - LEFT EYE: OS_EXAM: NORMAL

## 2020-05-29 NOTE — NURSING NOTE
Chief Complaints and History of Present Illnesses   Patient presents with     Stye / Hordeolum Evaluation     Chief Complaint(s) and History of Present Illness(es)     Stye / Hordeolum Evaluation     Laterality: right lower lid    Onset: 3 weeks ago    Associated symptoms: lid swelling and mattering.  Negative for lid pain and eye pain    Treatments tried: warm compresses and artificial tears    Pain scale: 0/10              Comments     About 3 weeks ago she developed a bump on her RLL.  She initially used warm compresses for a couple days but stopped when the compresses seemed ineffective.  After talking to Dr Leary of Wednesday she has done a couple more compresses.  She gets some matter in her right eye.  She has never had a stye prior to this one.    Shelly Smith, COT 10:13 AM  May 29, 2020

## 2020-05-29 NOTE — TELEPHONE ENCOUNTER
PA was submitted and denied   Note sent to provider for review of letter of appeal vs alternate  Shakeel Miranda RN 11:50 AM 06/02/20        [facility] Call Center    Phone Message    May a detailed message be left on voicemail: yes     Reason for Call: Medication Question or concern regarding medication   Prescription Clarification  Name of Medication: Eye ointment that was prescribed this morning.  Prescribing Provider: Dr. Hairston    Pharmacy: [facility] [address] [place] [address]    What on the order needs clarification? Pt was wondering if you can do a Prior Authorization for this medication?? Pt said that it cost over $350 out of pocket.  Pt would like a call back to discuss this since she has a lot of allergies. Pt would also like to know when you do send the PA to the pharm. Thanks        Action Taken: Message routed to:  [facility] eye clinic    Travel Screening: Not Applicable

## 2020-05-29 NOTE — PATIENT INSTRUCTIONS
1. Clean eyelids and lashes with baby shampoo and water (can use cotton rounds).     2. lotemax ointment inside the eye and on eyelid, 2 times daily    3. Warm compress, 20 minutes, at least 4 times daily.

## 2020-05-31 ENCOUNTER — TRANSFERRED RECORDS (OUTPATIENT)
Dept: HEALTH INFORMATION MANAGEMENT | Facility: CLINIC | Age: 75
End: 2020-05-31

## 2020-06-01 ENCOUNTER — TELEPHONE (OUTPATIENT)
Dept: OPHTHALMOLOGY | Facility: CLINIC | Age: 75
End: 2020-06-01

## 2020-06-01 NOTE — TELEPHONE ENCOUNTER
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  Insurance ID:  [id number]      Pharmacy Information (if different than what is on RX)  Name:  [facility] [address] [place] [id number] [facility]   Phone:  [phone]

## 2020-06-02 NOTE — TELEPHONE ENCOUNTER
PRIOR AUTHORIZATION DENIED    Medication: Loteprednol Etabonate (LOTEMAX) 0.5 % OINT opthalmic ointment -PA DENIED    Denial Date: 6/2/2020    Denial Rational:         Appeal Information:

## 2020-06-05 ENCOUNTER — TELEPHONE (OUTPATIENT)
Dept: OPHTHALMOLOGY | Facility: CLINIC | Age: 75
End: 2020-06-05

## 2020-06-05 DIAGNOSIS — H11.221 PYOGENIC GRANULOMA OF CONJUNCTIVA, RIGHT: ICD-10-CM

## 2020-06-05 NOTE — TELEPHONE ENCOUNTER
[phone]    Pt calling to review prior authorization for lotemax ointment    Reviewed PA was denied in review for appeal letter vs alternate therapy    Pt at this point has called and priced pharmacies for ointment and reviewed options with pharmacy rep      Pt would like to review other options now with Dr. Hairston including low dose steroids     Pt will allergies to medication       Note to Dr. Hairston for Call Back to review plan      Shakeel Miranda RN 4:45 PM 06/05/20      Addendum (Dr. Hairston):   Can try FML BID right eye for 7 days.

## 2020-06-08 RX ORDER — FLUOROMETHOLONE 0.1 %
1 SUSPENSION, DROPS(FINAL DOSAGE FORM)(ML) OPHTHALMIC (EYE)
Qty: 1 BOTTLE | Refills: 0 | Status: SHIPPED | OUTPATIENT
Start: 2020-06-08 | End: 2021-04-12

## 2020-06-08 RX ORDER — FLUOROMETHOLONE 0.1 %
1 SUSPENSION, DROPS(FINAL DOSAGE FORM)(ML) OPHTHALMIC (EYE) 2 TIMES DAILY
Qty: 1 BOTTLE | Refills: 0 | Status: SHIPPED | OUTPATIENT
Start: 2020-06-08 | End: 2020-06-08

## 2020-06-08 RX ORDER — FLUOROMETHOLONE 0.1 %
1 SUSPENSION, DROPS(FINAL DOSAGE FORM)(ML) OPHTHALMIC (EYE) 2 TIMES DAILY
Qty: 1 ML | Refills: 0 | Status: SHIPPED | OUTPATIENT
Start: 2020-06-08 | End: 2020-06-15

## 2020-07-23 ENCOUNTER — VIRTUAL VISIT (OUTPATIENT)
Dept: FAMILY MEDICINE | Facility: CLINIC | Age: 75
End: 2020-07-23
Payer: COMMERCIAL

## 2020-07-23 DIAGNOSIS — Z13.820 SCREENING FOR OSTEOPOROSIS: Primary | ICD-10-CM

## 2020-07-23 DIAGNOSIS — Z13.820 SCREENING FOR OSTEOPOROSIS: ICD-10-CM
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
## 2020-07-23 PROCEDURE — 99213 OFFICE O/P EST LOW 20 MIN: CPT | Mod: 95 | Performed by: NURSE PRACTITIONER

## 2020-07-23 PROCEDURE — 82523 COLLAGEN CROSSLINKS: CPT | Mod: 90 | Performed by: NURSE PRACTITIONER

## 2020-07-23 PROCEDURE — 99000 SPECIMEN HANDLING OFFICE-LAB: CPT | Performed by: NURSE PRACTITIONER

## 2020-07-23 PROCEDURE — 86618 LYME DISEASE ANTIBODY: CPT | Performed by: NURSE PRACTITIONER

## 2020-07-23 PROCEDURE — 36415 COLL VENOUS BLD VENIPUNCTURE: CPT | Performed by: NURSE PRACTITIONER

## 2020-07-23 ASSESSMENT — ANXIETY QUESTIONNAIRES
GAD7 TOTAL SCORE: 0
5. BEING SO RESTLESS THAT IT IS HARD TO SIT STILL: NOT AT ALL
3. WORRYING TOO MUCH ABOUT DIFFERENT THINGS: NOT AT ALL
6. BECOMING EASILY ANNOYED OR IRRITABLE: NOT AT ALL
7. FEELING AFRAID AS IF SOMETHING AWFUL MIGHT HAPPEN: NOT AT ALL
IF YOU CHECKED OFF ANY PROBLEMS ON THIS QUESTIONNAIRE, HOW DIFFICULT HAVE THESE PROBLEMS MADE IT FOR YOU TO DO YOUR WORK, TAKE CARE OF THINGS AT HOME, OR GET ALONG WITH OTHER PEOPLE: NOT DIFFICULT AT ALL
2. NOT BEING ABLE TO STOP OR CONTROL WORRYING: NOT AT ALL
1. FEELING NERVOUS, ANXIOUS, OR ON EDGE: NOT AT ALL

## 2020-07-23 ASSESSMENT — PATIENT HEALTH QUESTIONNAIRE - PHQ9
SUM OF ALL RESPONSES TO PHQ QUESTIONS 1-9: 0
5. POOR APPETITE OR OVEREATING: NOT AT ALL

## 2020-07-23 NOTE — PROGRESS NOTES
"Shaina Calixto is a 74 year old female who is being evaluated via a billable video visit.      The patient has been notified of following:     \"This video visit will be conducted via a call between you and your physician/provider. We have found that certain health care needs can be provided without the need for an in-person physical exam.  This service lets us provide the care you need with a video conversation.  If a prescription is necessary we can send it directly to your pharmacy.  If lab work is needed we can place an order for that and you can then stop by our lab to have the test done at a later time.    Video visits are billed at different rates depending on your insurance coverage.  Please reach out to your insurance provider with any questions.    If during the course of the call the physician/provider feels a video visit is not appropriate, you will not be charged for this service.\"    Patient has given verbal consent for Video visit? Yes  How would you like to obtain your AVS? MyChart  If you are dropped from the video visit, the video invite should be resent to: Send to e-mail at: [email or web address]  Will anyone else be joining your video visit? No    Subjective     Shaina Calixto is a 74 year old female who presents today via video visit for the following health issues:    HPI    Tick bite      Duration: Started May    Description (location/character/radiation): patient had tick bite in May and went to urgent care, they were unable to remove head    Intensity:  mild    Accompanying signs and symptoms: None    History (similar episodes/previous evaluation): None    Precipitating or alleviating factors: None    Therapies tried and outcome: None    Patient would like to discuss lyme testing           Video Start Time: 9:44am    Pt had a tick bite at the end of May.  Unable to get head out.  Seen at [facility].  Told to monitor.  No infection noted.  She believes it was attached 24 hours or " less.  Since that time she has had no symptoms of lyme disease.  She would like to check blood work to make sure no infection.      Patient Active Problem List   Diagnosis     Breast cancer screening     Mild major depression (H)     Yeast dermatitis     Stress     Urticaria     CARDIOVASCULAR SCREENING; LDL GOAL LESS THAN 160     High risk HPV infection     Multiple nevi     Cherry angioma     Screening for skin cancer     Restless legs syndrome (RLS)     Plantar fasciitis     HPV (human papilloma virus) infection     Raynaud's disease without gangrene     SABRA (obstructive sleep apnea)     Age-related osteoporosis without current pathological fracture     Eczema, unspecified type     Dyspepsia     Near syncope     Past Surgical History:   Procedure Laterality Date     ARTHROSCOPY KNEE RT/LT Right 2004     BIOPSY  Dec. 2016    spongiotic dermatitis     COLONOSCOPY  May 2015    normal     FOOT SURGERY Right 1995    due to foot injury     FOOT SURGERY Right 2000    ayers's neuroma     LASER YAG CAPSULOTOMY Bilateral 12/29/15     PHACOEMULSIFICATION CLEAR CORNEA WITH STANDARD INTRAOCULAR LENS IMPLANT Right 10/1/14     PHACOEMULSIFICATION CLEAR CORNEA WITH STANDARD INTRAOCULAR LENS IMPLANT Left 10/08/14     SOFT TISSUE SURGERY  1995    multiple crush & fx's to right foot       Social History     Tobacco Use     Smoking status: Never Smoker     Smokeless tobacco: Never Used   Substance Use Topics     Alcohol use: Yes     Comment: once every several months     Family History   Problem Relation Age of Onset     Endometrial Cancer Mother 73        only endometrial hyperplasia     Other - See Comments Mother         TIA     Parkinsonism Mother      Hypertension Mother      Thyroid Disease Mother      C.A.D. Maternal Grandfather      Cancer Father         AML     Other Cancer Father      Other Cancer Maternal Half-Brother      Other Cancer Cousin      Other Cancer Paternal Half-Brother      Glaucoma No family hx of       Diabetes No family hx of      Macular Degeneration No family hx of            Reviewed and updated as needed this visit by Provider         Review of Systems   Constitutional, HEENT, cardiovascular, pulmonary, gi and gu systems are negative, except as otherwise noted.      Objective             Physical Exam     GENERAL: Healthy, alert and no distress  EYES: Eyes grossly normal to inspection.  No discharge or erythema, or obvious scleral/conjunctival abnormalities.  RESP: No audible wheeze, cough, or visible cyanosis.  No visible retractions or increased work of breathing.    SKIN: Visible skin clear. No significant rash, abnormal pigmentation or lesions.  NEURO: Cranial nerves grossly intact.  Mentation and speech appropriate for age.  PSYCH: Mentation appears normal, affect normal/bright, judgement and insight intact, normal speech and appearance well-groomed.      Diagnostic Test Results:  Labs reviewed in Epic        Assessment & Plan     1. Screening for osteoporosis  Due for recheck.  Outside provider will treat as needed.  - N telopeptide cross linked urine; Future    2. Tick bite, subsequent encounter  Asymptomatic.  Lab ordered.  - Lyme Disease [place] with reflex to WB Serum; Future       No follow-ups on file.    JULES Gillette Ra, CNP  [facility] [place]      Video-Visit Details    Type of service:  Video Visit    Video End Time:10:01am    Originating Location (pt. Location): Home    Distant Location (provider location):  [facility]     Platform used for Video Visit: Koby    No follow-ups on file.       JULES Gillette Ra, CNP

## 2020-07-24 ENCOUNTER — MYC MEDICAL ADVICE (OUTPATIENT)
Dept: FAMILY MEDICINE | Facility: CLINIC | Age: 75
End: 2020-07-24

## 2020-07-24 LAB — B BURGDOR IGG+IGM SER QL: 0.05 (ref 0–0.89)

## 2020-07-24 ASSESSMENT — ANXIETY QUESTIONNAIRES: GAD7 TOTAL SCORE: 0

## 2020-07-25 LAB
COLLAGEN NTX/CREAT UR-SRTO: 47 BCE/MM
CREAT UR-MCNC: 24 MG/DL

## 2020-09-17 ENCOUNTER — TELEPHONE (OUTPATIENT)
Dept: FAMILY MEDICINE | Facility: CLINIC | Age: 75
End: 2020-09-17

## 2020-09-17 NOTE — TELEPHONE ENCOUNTER
Reason for call:  Symptom   Symptom or request: Stomach ache, chills, hot and nausea     Duration (how long have symptoms been present): since yesterday  Have you been treated for this before? No    Additional comments: Pt stated that got stomach ache after eating yesterday and had baking soda and water and went away, but go sick today again.     Phone number to reach patient:  Home number on file [phone] (home)    Best Time:  Any time     Can we leave a detailed message on this number?  YES    Travel screening: Not Applicable     Blue Carrasquillo on 9/17/2020 at 2:42 PM

## 2020-09-17 NOTE — TELEPHONE ENCOUNTER
Patient c/o episodes of stomach ache. Onset after lunch yesterday had stomach ache and nausea. Took baking soda and water per GI provider. Symptoms resolved.    Rt sided abdomen and chills this afternoon after walk.     Felt hot and had nausea, then diarrhea. Patient states feels better now.    Discussed clear liquid diet for rest of day and advance diet as tolerated.    If symptoms continue, call clinic.    Patient agreeable to plan.    Shaina Umanzor RN

## 2020-09-18 ENCOUNTER — VIRTUAL VISIT (OUTPATIENT)
Dept: FAMILY MEDICINE | Facility: CLINIC | Age: 75
End: 2020-09-18
Payer: COMMERCIAL
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
## 2020-09-18 DIAGNOSIS — R39.89 ABNORMAL URINE COLOR: ICD-10-CM

## 2020-09-18 DIAGNOSIS — R19.7 DIARRHEA, UNSPECIFIED TYPE: ICD-10-CM

## 2020-09-18 DIAGNOSIS — H53.9 VISION CHANGES: Primary | ICD-10-CM

## 2020-09-18 PROCEDURE — 99214 OFFICE O/P EST MOD 30 MIN: CPT | Performed by: NURSE PRACTITIONER

## 2020-09-18 NOTE — TELEPHONE ENCOUNTER
"FYI,    Patient has appt with you today. Main symptoms at this time is diarrhea after eating. Some tenderness in abdomen.     Patient mentioned has had episodes of lightheadedness \"out of body feeling with gray patches in vision.\" Denies dizziness.    Patient stated was fine for awhile and then had 2 episodes past 3 weeks.    Denies dizziness. Onset 2 years ago and had been to [facility]. Encounter from [facility] 8/22/2018 in chart.    Patient advised to go to ER if episode occurs, but should also make scheduled OV.    Patient understand that GI symptoms will be discussed today.    Shaina Umanzor RN        "

## 2020-09-18 NOTE — PROGRESS NOTES
"Shaina Calixto is a 74 year old female who is being evaluated via a billable telephone visit.      The patient has been notified of following:     \"This telephone visit will be conducted via a call between you and your physician/provider. We have found that certain health care needs can be provided without the need for a physical exam.  This service lets us provide the care you need with a short phone conversation.  If a prescription is necessary we can send it directly to your pharmacy.  If lab work is needed we can place an order for that and you can then stop by our lab to have the test done at a later time.    Telephone visits are billed at different rates depending on your insurance coverage. During this emergency period, for some insurers they may be billed the same as an in-person visit.  Please reach out to your insurance provider with any questions.    If during the course of the call the physician/provider feels a telephone visit is not appropriate, you will not be charged for this service.\"    Patient has given verbal consent for Telephone visit?  Yes    What phone number would you like to be contacted at? [phone]    How would you like to obtain your AVS? Michellhart    Subjective     Shaina Calixto is a 74 year old female who presents via phone visit today for the following health issues:    HPI    GI Issues    Pt concerned with recent episode of diarrhea and abdominal pain.  She was bedridden with the pain.  She has since been able to advance her diet and her bowels are improving.  No black or bloody stools.  She is on a very restrictive diet for management of SIBO directed by another provider.  She has been in contact with them and they have recommended some further interventions.    She also notes episodes of dizziness.  Occurring more frequently recently.  She denies any chest pain, sob.  Prior to this her most recent episode was a few years ago.      Review of Systems   Constitutional, HEENT, " cardiovascular, pulmonary, gi and gu systems are negative, except as otherwise noted.       Objective          Vitals:  No vitals were obtained today due to virtual visit.    healthy, alert and no distress  PSYCH: Alert and oriented times 3; coherent speech, normal   rate and volume, able to articulate logical thoughts, able   to abstract reason, no tangential thoughts, no hallucinations   or delusions  Her affect is normal  RESP: No cough, no audible wheezing, able to talk in full sentences  Remainder of exam unable to be completed due to telephone visits    No results found for any visits on 09/18/20.        Assessment/Plan:    Assessment & Plan     Vision changes  I recommended with these changes she see neurology for further evaluation.  Pt is open to this.  Referral placed.  - NEUROLOGY ADULT REFERRAL    Diarrhea, unspecified type  Resolving.  Continue with outside provider.  Let me know if symptoms recur.  Pt agrees with plan and verbalized understanding.    Abnormal urine color  - *UA reflex to Microscopic and Culture ([facility] and [facility] (except [place] and [place]); Future    Tick bite, subsequent encounter  Recheck today.  Initial test was negative.  - Lyme Disease [place] with reflex to WB Serum; Future      No follow-ups on file.    JULES Gillette Ra [facility] [place]    Phone call duration:  23 minutes

## 2020-09-22 ENCOUNTER — TELEPHONE (OUTPATIENT)
Dept: FAMILY MEDICINE | Facility: CLINIC | Age: 75
End: 2020-09-22

## 2020-09-22 NOTE — TELEPHONE ENCOUNTER
Pt is requesting a copy of her lab results from 3/13, Lipid panel reflex to direct LDL fasting, mailed to the address on file. Pt had deleted the one that was released to her MyChart.     Miroslava Cardozo on 9/22/2020 at 3:49 PM

## 2020-09-22 NOTE — LETTER
March 18, 2020      Shaina Calixto  [address] [place] [id number]        Dear Shaina,     Here are copies of your recent labs.  Your cholesterol levels have increased slightly over the past 2 years.   Your cardiac risk over the next 10 years is 12%.   We would typically recommend a medication to help lower this risk but I understand you do not want to pursue this.  This information can be helpful for you when working with your other providers.   Your other results look good.   Please let me know if you have any questions or concerns.         Thank you,     BRAULIO Gillette
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
## 2020-09-29 DIAGNOSIS — R39.89 ABNORMAL URINE COLOR: ICD-10-CM

## 2020-09-29 LAB
ALBUMIN UR-MCNC: NEGATIVE MG/DL
APPEARANCE UR: CLEAR
BILIRUB UR QL STRIP: NEGATIVE
COLOR UR AUTO: YELLOW
GLUCOSE UR STRIP-MCNC: NEGATIVE MG/DL
HGB UR QL STRIP: NEGATIVE
KETONES UR STRIP-MCNC: NEGATIVE MG/DL
LEUKOCYTE ESTERASE UR QL STRIP: NEGATIVE
NITRATE UR QL: NEGATIVE
PH UR STRIP: 5.5 PH (ref 5–7)
SOURCE: NORMAL
SP GR UR STRIP: 1.02 (ref 1–1.03)
UROBILINOGEN UR STRIP-ACNC: 0.2 EU/DL (ref 0.2–1)

## 2020-09-29 PROCEDURE — 86618 LYME DISEASE ANTIBODY: CPT | Performed by: NURSE PRACTITIONER

## 2020-09-29 PROCEDURE — 81003 URINALYSIS AUTO W/O SCOPE: CPT | Performed by: NURSE PRACTITIONER

## 2020-09-29 PROCEDURE — 36415 COLL VENOUS BLD VENIPUNCTURE: CPT | Performed by: NURSE PRACTITIONER

## 2020-09-30 LAB — B BURGDOR IGG+IGM SER QL: 0.05 (ref 0–0.89)

## 2020-10-01 ENCOUNTER — TRANSFERRED RECORDS (OUTPATIENT)
Dept: HEALTH INFORMATION MANAGEMENT | Facility: CLINIC | Age: 75
End: 2020-10-01

## 2020-10-16 ENCOUNTER — TRANSFERRED RECORDS (OUTPATIENT)
Dept: HEALTH INFORMATION MANAGEMENT | Facility: CLINIC | Age: 75
End: 2020-10-16

## 2020-10-30 ENCOUNTER — TRANSFERRED RECORDS (OUTPATIENT)
Dept: HEALTH INFORMATION MANAGEMENT | Facility: CLINIC | Age: 75
End: 2020-10-30

## 2020-11-06 ENCOUNTER — TRANSFERRED RECORDS (OUTPATIENT)
Dept: HEALTH INFORMATION MANAGEMENT | Facility: CLINIC | Age: 75
End: 2020-11-06

## 2020-11-09 ENCOUNTER — TRANSFERRED RECORDS (OUTPATIENT)
Dept: HEALTH INFORMATION MANAGEMENT | Facility: CLINIC | Age: 75
End: 2020-11-09

## 2020-11-16 ENCOUNTER — TELEPHONE (OUTPATIENT)
Dept: FAMILY MEDICINE | Facility: CLINIC | Age: 75
End: 2020-11-16

## 2020-11-16 NOTE — TELEPHONE ENCOUNTER
Symptoms    Describe your symptoms: inf on right hand index finger    Any pain: Yes:     How long have you been having symptoms:       Have you been seen for this:  No    Appointment offered?: Yes: refuses virtual. Will only come in.    Triage offered?: No    Home remedies tried:     Requested Pharmacy:     Okay to leave a detailed message? Yes at Home number on file [phone] (home)

## 2020-11-17 ENCOUNTER — VIRTUAL VISIT (OUTPATIENT)
Dept: FAMILY MEDICINE | Facility: CLINIC | Age: 75
End: 2020-11-17
Payer: COMMERCIAL

## 2020-11-17 DIAGNOSIS — M79.672 PAIN IN BOTH FEET: ICD-10-CM

## 2020-11-17 DIAGNOSIS — R79.89 OTHER SPECIFIED ABNORMAL FINDINGS OF BLOOD CHEMISTRY: ICD-10-CM

## 2020-11-17 DIAGNOSIS — Z13.1 DIABETES MELLITUS SCREENING: ICD-10-CM

## 2020-11-17 DIAGNOSIS — M79.671 PAIN IN BOTH FEET: ICD-10-CM

## 2020-11-17 DIAGNOSIS — E55.9 VITAMIN D DEFICIENCY: ICD-10-CM

## 2020-11-17 DIAGNOSIS — K58.0 IRRITABLE BOWEL SYNDROME WITH DIARRHEA: Primary | ICD-10-CM

## 2020-11-17 DIAGNOSIS — L03.011 CELLULITIS OF FINGER OF RIGHT HAND: ICD-10-CM

## 2020-11-17 DIAGNOSIS — K58.8 OTHER IRRITABLE BOWEL SYNDROME: ICD-10-CM

## 2020-11-17 PROCEDURE — 99214 OFFICE O/P EST MOD 30 MIN: CPT | Mod: 95 | Performed by: NURSE PRACTITIONER

## 2020-11-17 RX ORDER — DOXYCYCLINE 100 MG/1
100 CAPSULE ORAL 2 TIMES DAILY
Qty: 14 CAPSULE | Refills: 0 | Status: SHIPPED | OUTPATIENT
Start: 2020-11-17 | End: 2020-11-24

## 2020-11-17 NOTE — PROGRESS NOTES
"Shaina Calixto is a 74 year old female who is being evaluated via a billable video visit.      The patient has been notified of following:     \"This video visit will be conducted via a call between you and your physician/provider. We have found that certain health care needs can be provided without the need for an in-person physical exam.  This service lets us provide the care you need with a video conversation.  If a prescription is necessary we can send it directly to your pharmacy.  If lab work is needed we can place an order for that and you can then stop by our lab to have the test done at a later time.    Video visits are billed at different rates depending on your insurance coverage.  Please reach out to your insurance provider with any questions.    If during the course of the call the physician/provider feels a video visit is not appropriate, you will not be charged for this service.\"    Patient has given verbal consent for Video visit? Yes  How would you like to obtain your AVS? MyChart  If you are dropped from the video visit, the video invite should be resent to:   Will anyone else be joining your video visit? No    Subjective     Shaina Calixto is a 74 year old female who presents today via video visit for the following health issues:    [facility]            Video Start Time: 3:05 PM    Review recent neurology visit.  Reviewed results of MRI and EEG with neurology.  Normal EEG.  MRI/MRA ruled out TIA, tumor.  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  She will see an interventional radiologist for further evaluation and recommendations.  These were incidental findings.  Otherwise normal results.    Follows with obgyn regularly for + HPV 16.  At most recent visit they were concerned about hemorrhoids.  She had anal pap done by Dr. Rodriguez.  They would like to proceed with anal biopsy.    She continues to work with provider in [place] for [facility].  She is scheduled for biopsy and colonoscopy in 1 " week.    She has had issues with her feet recently.  Discussed with neurology.  They recommended checking B12, thyroid, and fasting blood sugar.    R hand finger infection.  Area was irritated, then split.  It seems to be swelling slightly and were also discolored.  It seems to be improving slightly.  She has been utilizing warm soaks as well as neosporin and bacitracin.    Also interested in having blood drawn at an outside lab.  She can drop off info on how I can place orders.  These orders have been done once, but need to be repeated for review by her provider in [place].        Review of Systems   Constitutional, HEENT, cardiovascular, pulmonary, gi and gu systems are negative, except as otherwise noted.      Objective           Vitals:  No vitals were obtained today due to virtual visit.    Physical Exam     GENERAL: Healthy, alert and no distress  EYES: Eyes grossly normal to inspection.  No discharge or erythema, or obvious scleral/conjunctival abnormalities.  RESP: No audible wheeze, cough, or visible cyanosis.  No visible retractions or increased work of breathing.    SKIN: Visible skin clear. No significant rash, abnormal pigmentation or lesions.  NEURO: Cranial nerves grossly intact.  Mentation and speech appropriate for age.  PSYCH: Mentation appears normal, affect normal/bright, judgement and insight intact, normal speech and appearance well-groomed.              Assessment & Plan     Pain in both feet  Ordering labs recommended by neurology.  She will then follow  Up with them.    Irritable bowel syndrome with diarrhea  - **Basic metabolic panel FUTURE anytime; Future  - Vitamin B12; Future  - CRP, inflammation; Future  - Thyroid peroxidase antibody; Future    Diabetes mellitus screening      Vitamin D deficiency  - Vitamin D Deficiency; Future    Cellulitis of finger of right hand  - doxycycline monohydrate (MONODOX) 100 MG capsule; Take 1 capsule (100 mg) by mouth 2 times daily for 7 days             No follow-ups on file.    JULES Gillette Ra, CNP  [facility]      Video-Visit Details    Type of service:  Video Visit    Video End Time:3:37 PM    Originating Location (pt. Location): Home    Distant Location (provider location):  [facility]     Platform used for Video Visit: [employer]

## 2020-11-19 ENCOUNTER — DOCUMENTATION ONLY (OUTPATIENT)
Dept: LAB | Facility: CLINIC | Age: 75
End: 2020-11-19
Payer: COMMERCIAL

## 2020-11-19 ENCOUNTER — TELEPHONE (OUTPATIENT)
Dept: FAMILY MEDICINE | Facility: CLINIC | Age: 75
End: 2020-11-19

## 2020-11-19 DIAGNOSIS — K58.0 IRRITABLE BOWEL SYNDROME WITH DIARRHEA: ICD-10-CM

## 2020-11-19 DIAGNOSIS — M79.671 PAIN IN BOTH FEET: ICD-10-CM

## 2020-11-19 DIAGNOSIS — E55.9 VITAMIN D DEFICIENCY: ICD-10-CM

## 2020-11-19 DIAGNOSIS — K58.8 OTHER IRRITABLE BOWEL SYNDROME: ICD-10-CM

## 2020-11-19 DIAGNOSIS — M79.672 PAIN IN BOTH FEET: ICD-10-CM

## 2020-11-19 DIAGNOSIS — R79.89 OTHER SPECIFIED ABNORMAL FINDINGS OF BLOOD CHEMISTRY: ICD-10-CM

## 2020-11-19 DIAGNOSIS — K58.8 OTHER IRRITABLE BOWEL SYNDROME: Primary | ICD-10-CM

## 2020-11-19 LAB
ANION GAP SERPL CALCULATED.3IONS-SCNC: 2 MMOL/L (ref 3–14)
BUN SERPL-MCNC: 22 MG/DL (ref 7–30)
CALCIUM SERPL-MCNC: 9 MG/DL (ref 8.5–10.1)
CHLORIDE SERPL-SCNC: 106 MMOL/L (ref 94–109)
CO2 SERPL-SCNC: 31 MMOL/L (ref 20–32)
CREAT SERPL-MCNC: 0.93 MG/DL (ref 0.52–1.04)
CRP SERPL-MCNC: <2.9 MG/L (ref 0–8)
DEPRECATED CALCIDIOL+CALCIFEROL SERPL-MC: 82 UG/L (ref 20–75)
GFR SERPL CREATININE-BSD FRML MDRD: 60 ML/MIN/{1.73_M2}
GLUCOSE SERPL-MCNC: 90 MG/DL (ref 70–99)
HBA1C MFR BLD: 5.5 % (ref 0–5.6)
POTASSIUM SERPL-SCNC: 3.7 MMOL/L (ref 3.4–5.3)
SODIUM SERPL-SCNC: 139 MMOL/L (ref 133–144)
T3FREE SERPL-MCNC: 2.4 PG/ML (ref 2.3–4.2)
T4 FREE SERPL-MCNC: 0.95 NG/DL (ref 0.76–1.46)
TSH SERPL DL<=0.005 MIU/L-ACNC: 1.44 MU/L (ref 0.4–4)
VIT B12 SERPL-MCNC: 1574 PG/ML (ref 193–986)

## 2020-11-19 PROCEDURE — 86376 MICROSOMAL ANTIBODY EACH: CPT | Performed by: NURSE PRACTITIONER

## 2020-11-19 PROCEDURE — 82306 VITAMIN D 25 HYDROXY: CPT | Performed by: NURSE PRACTITIONER

## 2020-11-19 PROCEDURE — 86140 C-REACTIVE PROTEIN: CPT | Performed by: NURSE PRACTITIONER

## 2020-11-19 PROCEDURE — 80048 BASIC METABOLIC PNL TOTAL CA: CPT | Performed by: NURSE PRACTITIONER

## 2020-11-19 PROCEDURE — 83090 ASSAY OF HOMOCYSTEINE: CPT | Performed by: NURSE PRACTITIONER

## 2020-11-19 PROCEDURE — 84443 ASSAY THYROID STIM HORMONE: CPT | Performed by: NURSE PRACTITIONER

## 2020-11-19 PROCEDURE — 84481 FREE ASSAY (FT-3): CPT | Performed by: NURSE PRACTITIONER

## 2020-11-19 PROCEDURE — 83036 HEMOGLOBIN GLYCOSYLATED A1C: CPT | Performed by: NURSE PRACTITIONER

## 2020-11-19 PROCEDURE — 99000 SPECIMEN HANDLING OFFICE-LAB: CPT | Performed by: NURSE PRACTITIONER

## 2020-11-19 PROCEDURE — 82607 VITAMIN B-12: CPT | Performed by: NURSE PRACTITIONER

## 2020-11-19 PROCEDURE — 84439 ASSAY OF FREE THYROXINE: CPT | Performed by: NURSE PRACTITIONER

## 2020-11-19 PROCEDURE — 84482 T3 REVERSE: CPT | Mod: 90 | Performed by: NURSE PRACTITIONER

## 2020-11-19 PROCEDURE — 36415 COLL VENOUS BLD VENIPUNCTURE: CPT | Performed by: NURSE PRACTITIONER

## 2020-11-19 NOTE — TELEPHONE ENCOUNTER
Reason for call:  Other   Patient called regarding (reason for call): call back  Additional comments: Patient stated she was working with someone about lab orders. She had labs taken today and they needed orders. Patient stated she needed to speak to someone asap because there is a new problem.    Phone number to reach patient:  Home number on file [phone] (home)    Best Time:  asap    Can we leave a detailed message on this number?  YES    Travel screening: Not Applicable     Mila Painter,

## 2020-11-19 NOTE — PROGRESS NOTES
Shaina was here at [facility] this morning for lab draw. She still think she need reverse T3, T3 free and homocysteine. We have her blood, can you please order her labs.   Thanks lab

## 2020-11-20 LAB — THYROPEROXIDASE AB SERPL-ACNC: <10 IU/ML

## 2020-11-21 LAB — T3REVERSE SERPL-MCNC: 14.9 NG/DL (ref 9–27)

## 2020-11-23 ENCOUNTER — TELEPHONE (OUTPATIENT)
Dept: FAMILY MEDICINE | Facility: CLINIC | Age: 75
End: 2020-11-23

## 2020-11-23 NOTE — TELEPHONE ENCOUNTER
Pt transferred to triage for questions on antibiotic. Prescribed Doxy on 11/17 for cellulitis of her finger. She was checking with another health provider about something else so she did not start Doxy until yesterday 11/22- she did take both pills, but then realized she has a colonoscopy in 2 days and has to do the prep tomorrow. So she hasn't taken any today. She is going to plan on starting Thursday (thanksgiving). She wants to make sure 6 days is ok.     She reports her finger looks about the same.   No red streaking.     Will route to MEAGAN ok to call back w/ want MEAGAN wants to do. Ok for a detailed message as well.     Roslyn CARDOZO RN

## 2020-11-25 ENCOUNTER — HOSPITAL PATHOLOGY (OUTPATIENT)
Dept: OTHER | Facility: CLINIC | Age: 75
End: 2020-11-25

## 2020-11-25 LAB — HCYS SERPL-SCNC: 6.7 UMOL/L (ref 4–12)

## 2020-11-30 LAB — COPATH REPORT: NORMAL

## 2020-12-25 ASSESSMENT — ENCOUNTER SYMPTOMS
JOINT SWELLING: 0
HEMATOCHEZIA: 0
EYE PAIN: 0
ARTHRALGIAS: 0
HEARTBURN: 0
DYSURIA: 0
MYALGIAS: 0
CONSTIPATION: 0
WEAKNESS: 0
BREAST MASS: 0
FEVER: 0
DIARRHEA: 0
DIZZINESS: 0
NAUSEA: 0
SORE THROAT: 0
FREQUENCY: 0
CHILLS: 0
SHORTNESS OF BREATH: 0
PARESTHESIAS: 1
HEADACHES: 0
HEMATURIA: 0
NERVOUS/ANXIOUS: 0
COUGH: 0
PALPITATIONS: 0
ABDOMINAL PAIN: 0

## 2020-12-25 ASSESSMENT — ACTIVITIES OF DAILY LIVING (ADL): CURRENT_FUNCTION: NO ASSISTANCE NEEDED

## 2020-12-28 ENCOUNTER — OFFICE VISIT (OUTPATIENT)
Dept: INTERNAL MEDICINE | Facility: CLINIC | Age: 75
End: 2020-12-28
Payer: COMMERCIAL

## 2020-12-28 VITALS
HEART RATE: 73 BPM | BODY MASS INDEX: 21.17 KG/M2 | OXYGEN SATURATION: 98 % | HEIGHT: 64 IN | RESPIRATION RATE: 16 BRPM | WEIGHT: 124 LBS | DIASTOLIC BLOOD PRESSURE: 65 MMHG | SYSTOLIC BLOOD PRESSURE: 106 MMHG

## 2020-12-28 DIAGNOSIS — Z00.00 ROUTINE GENERAL MEDICAL EXAMINATION AT A HEALTH CARE FACILITY: Primary | ICD-10-CM

## 2020-12-28 DIAGNOSIS — I67.1 BRAIN ANEURYSM: ICD-10-CM

## 2020-12-28 DIAGNOSIS — D12.3 ADENOMATOUS POLYP OF TRANSVERSE COLON: ICD-10-CM

## 2020-12-28 PROCEDURE — 99397 PER PM REEVAL EST PAT 65+ YR: CPT | Performed by: INTERNAL MEDICINE

## 2020-12-28 ASSESSMENT — ENCOUNTER SYMPTOMS
WEAKNESS: 0
HEARTBURN: 0
ARTHRALGIAS: 0
PALPITATIONS: 0
JOINT SWELLING: 0
DIZZINESS: 0
COUGH: 0
HEMATOCHEZIA: 0
SORE THROAT: 0
DIARRHEA: 0
NAUSEA: 0
HEMATURIA: 0
MYALGIAS: 0
SHORTNESS OF BREATH: 0
HEADACHES: 0
DYSURIA: 0
FEVER: 0
PARESTHESIAS: 1
FREQUENCY: 0
CHILLS: 0
BREAST MASS: 0
EYE PAIN: 0
CONSTIPATION: 0
NERVOUS/ANXIOUS: 0
ABDOMINAL PAIN: 0

## 2020-12-28 ASSESSMENT — MIFFLIN-ST. JEOR: SCORE: 1047.46

## 2020-12-28 ASSESSMENT — ACTIVITIES OF DAILY LIVING (ADL): CURRENT_FUNCTION: NO ASSISTANCE NEEDED

## 2020-12-28 NOTE — PROGRESS NOTES
Dr Swann's note    Patient's instructions / PLAN:                                                        Plan:  1.  The following vaccines are recommended for you. Please check with your insurance about coverage.  Some insurances cover better if you have these vaccines at the pharmacy:  -- Prevnar 13 ( pneumonia vaccine)  -- Shingerix vaccine - the newest vaccine for shingles   -- flu vaccine          ASSESSMENT & PLAN:                                                      (Z00.00) Routine general medical examination at a health care facility  (primary encounter diagnosis)  Comment:   Plan:     (I67.1) Brain aneurysm - 3 mm MRA 2020  Comment:   Plan:     (D12.3) Adenomatous polyp of transverse colon - needs colonoscopy 2025  Comment:   Plan:        Chief Complaint:                                                        Annual exam  Follow up chronic medical problems      SUBJECTIVE:                                                    History of present illness     We reviewed the chronic medical problems as above.   I reviewed the recent tests results in Epic     -- she gets thermography instead of mammogram    Questions:  -- check uvula  -- review  MRI/MRA : Brain Aneurysm - 3mm MRA 2020 - reviewed. MRI - normal   -- review labs Nov 19 -- high B12 and D   -- she keeps in touch with an alternative medicine doctor in [place] who told her the results are normal     Refuses flu vaccine      ROS:     See below        PMHx: - reviewed  Past Medical History:   Diagnosis Date     Ankylosing spondylitis (H)      HLAB19 +     Depressive disorder college, young adult    resolved for many years     Eosinophilic cellulitis      Mild major depression (H)      Osteopenia     -2.1; Lumbar -2.5; Fem Neck      Small intestinal bacterial overgrowth 05/2017    has had for years but finally diagnosed correctly May 2017     Vitamin deficiency        PSHx: reviewed  Past Surgical History:   Procedure Laterality Date     ARTHROSCOPY KNEE  RT/LT Right 2004     BIOPSY  Dec. 2016    spongiotic dermatitis     COLONOSCOPY  May 2015    normal     FOOT SURGERY Right 1995    due to foot injury     FOOT SURGERY Right 2000    ayers's neuroma     LASER YAG CAPSULOTOMY Bilateral 12/29/15     PHACOEMULSIFICATION CLEAR CORNEA WITH STANDARD INTRAOCULAR LENS IMPLANT Right 10/1/14     PHACOEMULSIFICATION CLEAR CORNEA WITH STANDARD INTRAOCULAR LENS IMPLANT Left 10/08/14     SOFT TISSUE SURGERY  1995    multiple crush & fx's to right foot        Soc Hx: No daily alcohol, no smoking  Social History     Socioeconomic History     Marital status:      Spouse name: Not on file     Number of children: Not on file     Years of education: Not on file     Highest education level: Not on file   Occupational History     Not on file   Social Needs     Financial resource strain: Not on file     Food insecurity     Worry: Not on file     Inability: Not on file     Transportation needs     Medical: Not on file     Non-medical: Not on file   Tobacco Use     Smoking status: Never Smoker     Smokeless tobacco: Never Used   Substance and Sexual Activity     Alcohol use: Yes     Comment: once every several months     Drug use: No     Sexual activity: Never   Lifestyle     Physical activity     Days per week: Not on file     Minutes per session: Not on file     Stress: Not on file   Relationships     Social connections     Talks on phone: Not on file     Gets together: Not on file     Attends Zoroastrianism service: Not on file     Active member of club or organization: Not on file     Attends meetings of clubs or organizations: Not on file     Relationship status: Not on file     Intimate partner violence     Fear of current or ex partner: Not on file     Emotionally abused: Not on file     Physically abused: Not on file     Forced sexual activity: Not on file   Other Topics Concern     Parent/sibling w/ CABG, MI or angioplasty before 65F 55M? No   Social History Narrative    How much  exercise per week? Walking    How much calcium per day? Boron and dairy       How much caffeine per day? 1-2 cups coffee    How much vitamin D per day? supplement    Do you/your family wear seatbelts?  Yes    Do you/your family use safety helmets? Yes    Do you/your family use sunscreen? Yes    Do you/your family keep firearms in the home? No    Do you/your family have a smoke detector(s)? Yes        Do you feel safe in your home? Yes    Has anyone ever touched you in an unwanted manner? No     Explain          March 26, 2015 Maria G Cardenas LPN                Fam Hx: reviewed  Family History   Problem Relation Age of Onset     Endometrial Cancer Mother 73        only endometrial hyperplasia     Other - See Comments Mother         TIA     Parkinsonism Mother      Hypertension Mother      Thyroid Disease Mother      C.A.D. Maternal Grandfather      Cancer Father         AML     Other Cancer Father      Other Cancer Maternal Half-Brother      Other Cancer Cousin      Other Cancer Paternal Half-Brother      Glaucoma No family hx of      Diabetes No family hx of      Macular Degeneration No family hx of          Screening: reviewed      All: reviewed    Meds: reviewed  Current Outpatient Medications   Medication Sig Dispense Refill     Alpha-Lipoic Acid 200 MG TABS Take by mouth 2 times daily       cholecalciferol (VITAMIN D3) 125 MCG (5000 UT) TABS tablet Take by mouth daily       fluorometholone (FML LIQUIFILM) 0.1 % ophthalmic suspension Place 1 drop into the right eye 2 times daily (Patient not taking: Reported on 7/23/2020) 1 Bottle 0     GLYCINE PO Take 1 g by mouth daily       Menaquinone-7 (VITAMIN K2 PO)        Omega-3 Fatty Acids (OMEGA-3 CF PO) Take 1,280 mg by mouth daily       Probiotic Product (sofatutor PO) Take by mouth daily       UNABLE TO FIND MEDICATION NAME: aller dhq -1 qd       UNABLE TO FIND MEDICATION NAME: curcu plex  mg-2 qd       UNABLE TO FIND MEDICATION NAME: ocamine  "oxidase(histamine block), 4.2 mg, prn       UNABLE TO FIND MEDICATION NAME: TH 2 modutator, 1-2 bid       UNABLE TO FIND MEDICATION NAME: glutaloemine, bid       UNABLE TO FIND MEDICATION NAME: magnesium l threonate, 1 scoop bid       UNABLE TO FIND MEDICATION NAME: pepti guard       UNABLE TO FIND MEDICATION NAME: [facility] caps       UNABLE TO FIND MEDICATION NAME: reyene max liquid choline 120 mg & solicon 6 mg-6 drops       UNABLE TO FIND MEDICATION NAME: IGG CWP 2000, 5 gram immunoglobin         OBJECTIVE:                                                    Physical Exam :  Blood pressure 106/65, pulse 73, resp. rate 16, height 1.626 m (5' 4\"), weight 56.2 kg (124 lb), SpO2 98 %, not currently breastfeeding.     NAD, appears comfortable  Skin clear, no rashes  HEENT: PERRLA, EOMI, anicteric sclera, pink conjunctiva, external ears appear normal, bilateral tympanic membranes clinically normal, oropharynx normal color.   Neck: supple, no JVD,  no thyroidmegaly  Lymph nodes non palpable in the cervical, supraclavicular axillaries,   Chest: clear to auscultation with good respiratory effort  Cardiac: S1S2, RRR, no mgr appreciated  Abdomen: soft, not tender, not distended, audible bowel sound, no hepatosplenomegaly, no palpable masses, no abdominal bruits  Extremities: no cyanosis, clubbing or edema.   Neuro: A, Ox3, no focal signs.  Breast exam in supine and erect position: they are symmetrical, no skin changes, no tenderness or nodes on palpation. Nipples are erect, no skin lesions, no discharge on pressure.    Pelvic exam: deferred, s/p menopause, no symptoms, no hx of abnormal pap         Chelsea Swann MD  Internal Medicine         SUBJECTIVE:   Shaina Calixto is a 75 year old female who presents for Preventive Visit.      Patient has been advised of split billing requirements and indicates understanding: Yes   Are you in the first 12 months of your Medicare coverage?  No    Healthy Habits:     In general, how would " "you rate your overall health?  Excellent    Frequency of exercise:  6-7 days/week    Duration of exercise:  15-30 minutes    Do you usually eat at least 4 servings of fruit and vegetables a day, include whole grains    & fiber and avoid regularly eating high fat or \"junk\" foods?  Yes    Taking medications regularly:  Not Applicable    Medication side effects:  Not applicable    Ability to successfully perform activities of daily living:  No assistance needed    Home Safety:  No safety concerns identified    Hearing Impairment:  Difficulty following a conversation in a noisy restaurant or crowded room, difficulty following dialogue in the theater and difficulty understanding soft or whispered speech    In the past 6 months, have you been bothered by leaking of urine?  No    In general, how would you rate your overall mental or emotional health?  Excellent      PHQ-2 Total Score: 0    Additional concerns today:  No    Do you feel safe in your environment? Yes    Have you ever done Advance Care Planning? (For example, a Health Directive, POLST, or a discussion with a medical provider or your loved ones about your wishes): No, advance care planning information given to patient to review.  Advanced care planning was discussed at today's visit.      Fall risk       Fallen two or more times in the last year   No   Any fall with an injury in the last year  No    Cognitive Screening   1) Repeat 3 items (Leader, Season, Table)    2) Clock draw: NORMAL  3) 3 item recall: Recalls 3 objects  Results: 3 items recalled: COGNITIVE IMPAIRMENT LESS LIKELY    Mini-CogTM Copyright S Sophy. Licensed by the author for use in [facility]; reprinted with permission (selene@.[facility]). All rights reserved.      Do you have sleep apnea, excessive snoring or daytime drowsiness?: yes    Reviewed and updated as needed this visit by clinical staff                 Reviewed and updated as needed this visit by Provider              "   Social History     Tobacco Use     Smoking status: Never Smoker     Smokeless tobacco: Never Used   Substance Use Topics     Alcohol use: Yes     Comment: once every several months         Alcohol Use 12/25/2020   Prescreen: >3 drinks/day or >7 drinks/week? No   Prescreen: >3 drinks/day or >7 drinks/week? -               Current providers sharing in care for this patient include:   Patient Care Team:  Ami Aguilar Ra, APRN CNP as PCP - General (Family Practice)  Jennifer Valero MD as MD (Family Practice)  Octavia Marroquin MD as MD (Ophthalmology)  Ami Aguilar Ra, APRN CNP as Assigned PCP  Jerry Hairston Chi, OD as Assigned Surgical Provider    The following health maintenance items are reviewed in Epic and correct as of today:  Health Maintenance   Topic Date Due     ZOSTER IMMUNIZATION (1 of 2) 12/07/1996     MAMMO SCREENING  03/26/2017     INFLUENZA VACCINE (1) 09/01/2020     PHQ-9  10/23/2020     FALL RISK ASSESSMENT  12/19/2020     MEDICARE ANNUAL WELLNESS VISIT  12/19/2020     ADVANCE CARE PLANNING  12/20/2024     LIPID  03/13/2025     COLORECTAL CANCER SCREENING  05/01/2025     DTAP/TDAP/TD IMMUNIZATION (12 - Td) 09/06/2028     DEXA  04/08/2030     HEPATITIS C SCREENING  Completed     DEPRESSION ACTION PLAN  Completed     Pneumococcal Vaccine: 65+ Years  Completed     Pneumococcal Vaccine: Pediatrics (0 to 5 Years) and At-Risk Patients (6 to 64 Years)  Aged Out     IPV IMMUNIZATION  Aged Out     MENINGITIS IMMUNIZATION  Aged Out     HEPATITIS B IMMUNIZATION  Aged Out     Labs reviewed in EPIC      Review of Systems   Constitutional: Negative for chills and fever.   HENT: Negative for congestion, ear pain, hearing loss and sore throat.    Eyes: Negative for pain and visual disturbance.   Respiratory: Negative for cough and shortness of breath.    Cardiovascular: Negative for chest pain, palpitations and peripheral edema.   Gastrointestinal: Negative for abdominal pain, constipation, diarrhea,  "heartburn, hematochezia and nausea.   Breasts:  Negative for tenderness, breast mass and discharge.   Genitourinary: Negative for dysuria, frequency, genital sores, hematuria, pelvic pain, urgency, vaginal bleeding and vaginal discharge.   Musculoskeletal: Negative for arthralgias, joint swelling and myalgias.   Skin: Negative for rash.   Neurological: Positive for paresthesias. Negative for dizziness, weakness and headaches.   Psychiatric/Behavioral: Negative for mood changes. The patient is not nervous/anxious.          Patient has been advised of split billing requirements and indicates understanding: Yes At the check in, at the    COUNSELING:  Reviewed preventive health counseling, as reflected in patient instructions       Regular exercise       Healthy diet/nutrition    Estimated body mass index is 22.81 kg/m  as calculated from the following:    Height as of 2/6/20: 1.626 m (5' 4\").    Weight as of 2/6/20: 60.3 kg (132 lb 14.4 oz).        She reports that she has never smoked. She has never used smokeless tobacco.      Appropriate preventive services were discussed with this patient, including applicable screening as appropriate for cardiovascular disease, diabetes, osteopenia/osteoporosis, and glaucoma.  As appropriate for age/gender, discussed screening for colorectal cancer, prostate cancer, breast cancer, and cervical cancer. Checklist reviewing preventive services available has been given to the patient.    Reviewed patients plan of care and provided an AVS. The Basic Care Plan (routine screening as documented in Health Maintenance) for Shaina meets the Care Plan requirement. This Care Plan has been established and reviewed with the Patient.    Counseling Resources:  ATP IV Guidelines  Pooled Cohorts Equation Calculator  Breast Cancer Risk Calculator  Breast Cancer: Medication to Reduce Risk  FRAX Risk Assessment  ICSI Preventive Guidelines  Dietary Guidelines for Americans, 2010  USDA's " MyPlate  ASA Prophylaxis  Lung CA Screening    Chelsea Cornelius MD  [facility]    Identified Health Risks:  Answers for HPI/ROS submitted by the patient on 12/28/2020   Annual Exam:  If you checked off any problems, how difficult have these problems made it for you to do your work, take care of things at home, or get along with other people?: Not difficult at all  PHQ9 TOTAL SCORE: 0

## 2020-12-28 NOTE — PATIENT INSTRUCTIONS
Plan:  1.  The following vaccines are recommended for you. Please check with your insurance about coverage.  Some insurances cover better if you have these vaccines at the pharmacy:  -- Prevnar 13 ( pneumonia vaccine)  -- Shingerix vaccine - the newest vaccine for shingles   -- flu vaccine

## 2020-12-29 ASSESSMENT — PATIENT HEALTH QUESTIONNAIRE - PHQ9: SUM OF ALL RESPONSES TO PHQ QUESTIONS 1-9: 0

## 2021-01-14 ENCOUNTER — HEALTH MAINTENANCE LETTER (OUTPATIENT)
Age: 76
End: 2021-01-14

## 2021-03-04 ENCOUNTER — TRANSFERRED RECORDS (OUTPATIENT)
Dept: HEALTH INFORMATION MANAGEMENT | Facility: CLINIC | Age: 76
End: 2021-03-04

## 2021-03-23 ENCOUNTER — TRANSFERRED RECORDS (OUTPATIENT)
Dept: HEALTH INFORMATION MANAGEMENT | Facility: CLINIC | Age: 76
End: 2021-03-23

## 2021-04-12 ENCOUNTER — VIRTUAL VISIT (OUTPATIENT)
Dept: FAMILY MEDICINE | Facility: CLINIC | Age: 76
End: 2021-04-12
Payer: COMMERCIAL

## 2021-04-12 DIAGNOSIS — K90.89 OTHER INTESTINAL MALABSORPTION: ICD-10-CM

## 2021-04-12 DIAGNOSIS — E55.9 VITAMIN D DEFICIENCY, UNSPECIFIED: Primary | ICD-10-CM

## 2021-04-12 DIAGNOSIS — R53.83 FATIGUE, UNSPECIFIED TYPE: ICD-10-CM

## 2021-04-12 PROCEDURE — 99213 OFFICE O/P EST LOW 20 MIN: CPT | Mod: 95 | Performed by: NURSE PRACTITIONER

## 2021-04-12 RX ORDER — NYSTATIN 500000 [USP'U]/1
TABLET, COATED ORAL 3 TIMES DAILY
COMMUNITY
Start: 2021-03-19 | End: 2022-03-09

## 2021-04-12 ASSESSMENT — ANXIETY QUESTIONNAIRES
GAD7 TOTAL SCORE: 0
2. NOT BEING ABLE TO STOP OR CONTROL WORRYING: NOT AT ALL
3. WORRYING TOO MUCH ABOUT DIFFERENT THINGS: NOT AT ALL
6. BECOMING EASILY ANNOYED OR IRRITABLE: NOT AT ALL
5. BEING SO RESTLESS THAT IT IS HARD TO SIT STILL: NOT AT ALL
1. FEELING NERVOUS, ANXIOUS, OR ON EDGE: NOT AT ALL
7. FEELING AFRAID AS IF SOMETHING AWFUL MIGHT HAPPEN: NOT AT ALL

## 2021-04-12 ASSESSMENT — PATIENT HEALTH QUESTIONNAIRE - PHQ9
5. POOR APPETITE OR OVEREATING: NOT AT ALL
SUM OF ALL RESPONSES TO PHQ QUESTIONS 1-9: 0

## 2021-04-12 NOTE — Clinical Note
Bonnie, can you keep her on your list of pts that want a vaccine appt- she just wants to be notified when the scheduling for the pharmacy for [facility] is open again so she can get on the schedule there.

## 2021-04-12 NOTE — PROGRESS NOTES
Fatigue Shaina is a 75 year old who is being evaluated via a billable telephone visit.      What phone number would you like to be contacted at? [phone]  How would you like to obtain your AVS? MyChart    Assessment & Plan     Vitamin D deficiency, unspecified   - **Vitamin D Deficiency FUTURE anytime; Future    Fatigue, unspecified type  Likely due to recent infection.  Improving over time.  Will still check labs.  - **Vitamin D Deficiency FUTURE anytime; Future  - **CBC with platelets FUTURE anytime; Future  - Vitamin B12; Future    Other intestinal malabsorption   Working with outside clinic.  Some setbacks due to infection and antibiotics, but she had been making good progress.  - Ferritin; Future  - **Iron and iron binding capacity FUTURE anytime; Future        No follow-ups on file.    Ami Aguilar, JULES CNP  [facility] [place]    Subjective   Shaina is a 75 year old who presents for the following health issues     HPI     Would like to discuss getting labs done for Ferritin and iron.  Is having fatigue.   Recently treated for a perianal abscess.  I & D x2.  Has another follow up coming up.  She did end up on bactrim for treatment as well.  Continues to work with outside clinic for SIBO.  She had been making great strides, some setbacks due to recent infection.  She is thinking her fatigue is due to her infection but wants to make sure labs are okay.    Review of Systems   Constitutional, HEENT, cardiovascular, pulmonary, gi and gu systems are negative, except as otherwise noted.      Objective           Vitals:  No vitals were obtained today due to virtual visit.    Physical Exam   healthy, alert and no distress  PSYCH: Alert and oriented times 3; coherent speech, normal   rate and volume, able to articulate logical thoughts, able   to abstract reason, no tangential thoughts, no hallucinations   or delusions  Her affect is normal  RESP: No cough, no audible wheezing, able to talk in  full sentences  Remainder of exam unable to be completed due to telephone visits                Phone call duration: 16 minutes

## 2021-04-13 ENCOUNTER — TRANSFERRED RECORDS (OUTPATIENT)
Dept: HEALTH INFORMATION MANAGEMENT | Facility: CLINIC | Age: 76
End: 2021-04-13

## 2021-04-13 ASSESSMENT — ANXIETY QUESTIONNAIRES: GAD7 TOTAL SCORE: 0

## 2021-04-23 DIAGNOSIS — E55.9 VITAMIN D DEFICIENCY, UNSPECIFIED: ICD-10-CM

## 2021-04-23 DIAGNOSIS — K90.89 OTHER INTESTINAL MALABSORPTION: ICD-10-CM

## 2021-04-23 DIAGNOSIS — R53.83 FATIGUE, UNSPECIFIED TYPE: ICD-10-CM

## 2021-04-23 LAB
ERYTHROCYTE [DISTWIDTH] IN BLOOD BY AUTOMATED COUNT: 13.1 % (ref 10–15)
FERRITIN SERPL-MCNC: 41 NG/ML (ref 8–252)
HCT VFR BLD AUTO: 43.8 % (ref 35–47)
HGB BLD-MCNC: 15.4 G/DL (ref 11.7–15.7)
IRON SATN MFR SERPL: 66 % (ref 15–46)
IRON SERPL-MCNC: 224 UG/DL (ref 35–180)
MCH RBC QN AUTO: 35.7 PG (ref 26.5–33)
MCHC RBC AUTO-ENTMCNC: 35.2 G/DL (ref 31.5–36.5)
MCV RBC AUTO: 102 FL (ref 78–100)
PLATELET # BLD AUTO: 204 10E9/L (ref 150–450)
RBC # BLD AUTO: 4.31 10E12/L (ref 3.8–5.2)
TIBC SERPL-MCNC: 342 UG/DL (ref 240–430)
VIT B12 SERPL-MCNC: 4711 PG/ML (ref 193–986)
WBC # BLD AUTO: 5.3 10E9/L (ref 4–11)

## 2021-04-23 PROCEDURE — 82728 ASSAY OF FERRITIN: CPT | Performed by: NURSE PRACTITIONER

## 2021-04-23 PROCEDURE — 83540 ASSAY OF IRON: CPT | Performed by: NURSE PRACTITIONER

## 2021-04-23 PROCEDURE — 85027 COMPLETE CBC AUTOMATED: CPT | Performed by: NURSE PRACTITIONER

## 2021-04-23 PROCEDURE — 82306 VITAMIN D 25 HYDROXY: CPT | Performed by: NURSE PRACTITIONER

## 2021-04-23 PROCEDURE — 82607 VITAMIN B-12: CPT | Performed by: NURSE PRACTITIONER

## 2021-04-23 PROCEDURE — 83550 IRON BINDING TEST: CPT | Performed by: NURSE PRACTITIONER

## 2021-04-23 PROCEDURE — 36415 COLL VENOUS BLD VENIPUNCTURE: CPT | Performed by: NURSE PRACTITIONER

## 2021-04-26 LAB — DEPRECATED CALCIDIOL+CALCIFEROL SERPL-MC: 86 UG/L (ref 20–75)

## 2021-04-30 ENCOUNTER — TRANSFERRED RECORDS (OUTPATIENT)
Dept: HEALTH INFORMATION MANAGEMENT | Facility: CLINIC | Age: 76
End: 2021-04-30

## 2021-05-03 ENCOUNTER — TRANSFERRED RECORDS (OUTPATIENT)
Dept: HEALTH INFORMATION MANAGEMENT | Facility: CLINIC | Age: 76
End: 2021-05-03
Payer: COMMERCIAL

## 2021-05-05 ENCOUNTER — HOSPITAL ENCOUNTER (OUTPATIENT)
Facility: CLINIC | Age: 76
End: 2021-05-05
Attending: COLON & RECTAL SURGERY | Admitting: COLON & RECTAL SURGERY
Payer: COMMERCIAL

## 2021-05-05 DIAGNOSIS — Z11.59 ENCOUNTER FOR SCREENING FOR OTHER VIRAL DISEASES: ICD-10-CM

## 2021-05-14 ENCOUNTER — HOSPITAL ENCOUNTER (OUTPATIENT)
Dept: LAB | Facility: CLINIC | Age: 76
Discharge: HOME | End: 2021-05-14
Attending: COLON & RECTAL SURGERY | Admitting: COLON & RECTAL SURGERY
Payer: COMMERCIAL

## 2021-05-14 ENCOUNTER — OFFICE VISIT (OUTPATIENT)
Dept: FAMILY MEDICINE | Facility: CLINIC | Age: 76
End: 2021-05-14
Payer: COMMERCIAL

## 2021-05-14 VITALS
HEART RATE: 70 BPM | DIASTOLIC BLOOD PRESSURE: 70 MMHG | TEMPERATURE: 97.5 F | OXYGEN SATURATION: 98 % | HEIGHT: 64 IN | SYSTOLIC BLOOD PRESSURE: 110 MMHG | WEIGHT: 124.8 LBS | BODY MASS INDEX: 21.31 KG/M2 | RESPIRATION RATE: 14 BRPM

## 2021-05-14 DIAGNOSIS — Z11.59 ENCOUNTER FOR SCREENING FOR OTHER VIRAL DISEASES: ICD-10-CM

## 2021-05-14 DIAGNOSIS — K60.30 ANAL FISTULA: ICD-10-CM

## 2021-05-14 DIAGNOSIS — Z01.818 PREOP GENERAL PHYSICAL EXAM: Primary | ICD-10-CM

## 2021-05-14 DIAGNOSIS — I48.91 ATRIAL FIBRILLATION, UNSPECIFIED TYPE (H): ICD-10-CM

## 2021-05-14 DIAGNOSIS — N18.30 STAGE 3 CHRONIC KIDNEY DISEASE, UNSPECIFIED WHETHER STAGE 3A OR 3B CKD (H): ICD-10-CM

## 2021-05-14 LAB
SARS-COV-2 RNA RESP QL NAA+PROBE: NORMAL
SPECIMEN SOURCE: NORMAL

## 2021-05-14 PROCEDURE — U0003 INFECTIOUS AGENT DETECTION BY NUCLEIC ACID (DNA OR RNA); SEVERE ACUTE RESPIRATORY SYNDROME CORONAVIRUS 2 (SARS-COV-2) (CORONAVIRUS DISEASE [COVID-19]), AMPLIFIED PROBE TECHNIQUE, MAKING USE OF HIGH THROUGHPUT TECHNOLOGIES AS DESCRIBED BY CMS-2020-01-R: HCPCS | Performed by: COLON & RECTAL SURGERY

## 2021-05-14 PROCEDURE — 93000 ELECTROCARDIOGRAM COMPLETE: CPT | Performed by: PHYSICIAN ASSISTANT

## 2021-05-14 PROCEDURE — U0005 INFEC AGEN DETEC AMPLI PROBE: HCPCS | Performed by: COLON & RECTAL SURGERY

## 2021-05-14 PROCEDURE — 99215 OFFICE O/P EST HI 40 MIN: CPT | Performed by: PHYSICIAN ASSISTANT

## 2021-05-14 RX ORDER — METOPROLOL SUCCINATE 25 MG/1
25 TABLET, EXTENDED RELEASE ORAL DAILY
Qty: 90 TABLET | Refills: 0 | Status: SHIPPED | OUTPATIENT
Start: 2021-05-14 | End: 2021-05-14

## 2021-05-14 RX ORDER — METOPROLOL SUCCINATE 25 MG/1
25 TABLET, EXTENDED RELEASE ORAL DAILY
Qty: 90 TABLET | Refills: 0 | Status: SHIPPED | OUTPATIENT
Start: 2021-05-14 | End: 2022-11-15 | Stop reason: ALTCHOICE

## 2021-05-14 RX ORDER — EVENING PRIMROSE OIL 500 MG
CAPSULE ORAL DAILY
COMMUNITY
End: 2021-05-14 | Stop reason: HOSPADM

## 2021-05-14 ASSESSMENT — MIFFLIN-ST. JEOR: SCORE: 1051.09

## 2021-05-14 ASSESSMENT — PAIN SCALES - GENERAL: PAINLEVEL: NO PAIN (0)

## 2021-05-14 NOTE — PATIENT INSTRUCTIONS

## 2021-05-14 NOTE — PROGRESS NOTES
[facility]  [address] [address] [address]  Phone: [phone]  Primary Provider: Ami Aguilar Ra  Pre-op Performing Provider: XAVIER KIM      PREOPERATIVE EVALUATION:  Today's date: 5/14/2021    Shaina Calixto is a 75 year old female who presents for a preoperative evaluation.    Surgical Information:  Surgery/Procedure: exam under anesthesia placement of seton  Surgery Location: [facility]  Surgeon: Jennifer  Surgery Date: 5/18  Time of Surgery: 1:10  Where patient plans to recover: Other: Home with a friend  Fax number for surgical facility: Note does not need to be faxed, will be available electronically in Epic.    Type of Anesthesia Anticipated: to be determined    Assessment & Plan     The proposed surgical procedure is considered INTERMEDIATE risk.    Preop general physical exam  Anal fistula  Will need to delay procedure given new onset A fib. LIkely will be able to stabilize relatively quickly but the question will be of coming off AC for procedure. Surgeons team notified  - Basic metabolic panel  - EKG 12-lead complete w/read - Clinics  - EKG 12-lead complete w/read - Clinics    Stage 3 chronic kidney disease, unspecified whether stage 3a or 3b CKD  Plan to update BMP (GRF hovering in 50s and at 60)    Atrial fibrillation, unspecified type (H)  NEW dx. She was asymptomatic to this. Discussed with patient and burke is somewhat familiar with the condition as had a friend recently go through the same thing. Her CHADS would be a 2 given sex and age. Discussed intitiation of Rate control (already controlled today) as well as AC. She is very hesitant. She wants to think about this. Discussed risks of a fib and not treating along with what to watch for if deciding she does not want medication. We'll set her up with cardiology        RECOMMENDATION:  Surgery to be delayed.         Subjective     HPI related to upcoming procedure: Patient recently had rectal abscess  I&D'd with continued discharge; undergoing Seton placement     Preop Questions 5/14/2021   1. Have you ever had a heart attack or stroke? No   2. Have you ever had surgery on your heart or blood vessels, such as a stent placement, a coronary artery bypass, or surgery on an artery in your head, neck, heart, or legs? No   3. Do you have chest pain with activity? No   4. Do you have a history of  heart failure? No   5. Do you currently have a cold, bronchitis or symptoms of other infection? YES - the abscess    6. Do you have a cough, shortness of breath, or wheezing? No   7. Do you or anyone in your family have previous history of blood clots? No   8. Do you or does anyone in your family have a serious bleeding problem such as prolonged bleeding following surgeries or cuts? No   9. Have you ever had problems with anemia or been told to take iron pills? YES - hx of anemia previously; recent CBC ok   10. Have you had any abnormal blood loss such as black, tarry or bloody stools, or abnormal vaginal bleeding? No   11. Have you ever had a blood transfusion? No   12. Are you willing to have a blood transfusion if it is medically needed before, during, or after your surgery? Yes   13. Have you or any of your relatives ever had problems with anesthesia? No   14. Do you have sleep apnea, excessive snoring or daytime drowsiness? YES - Sleep apnea; uses appliance   14a. Do you have a CPAP machine? No   15. Do you have any artifical heart valves or other implanted medical devices like a pacemaker, defibrillator, or continuous glucose monitor? No   16. Do you have artificial joints? No   17. Are you allergic to latex? No       Health Care Directive:  Patient does not have a Health Care Directive or Living Will: Patient states has Advance Directive and will bring in a copy to clinic.    Preoperative Review of :   reviewed - one Rx for opiods in 11/2020      Status of Chronic Conditions:  See problem list for active medical  problems.  Problems all longstanding and stable, except as noted/documented.  See ROS for pertinent symptoms related to these conditions.      Review of Systems  Constitutional, neuro, ENT, endocrine, pulmonary, cardiac, gastrointestinal, genitourinary, musculoskeletal, integument and psychiatric systems are negative, except as otherwise noted.    Patient Active Problem List    Diagnosis Date Noted     Restless legs syndrome (RLS) 09/02/2018     Priority: Medium     Plantar fasciitis 09/02/2018     Priority: Medium     HPV (human papilloma virus) infection 09/02/2018     Priority: Medium     Raynaud's disease without gangrene 09/02/2018     Priority: Medium     SABRA (obstructive sleep apnea) 09/02/2018     Priority: Medium     Age-related osteoporosis without current pathological fracture 09/02/2018     Priority: Medium     Eczema, unspecified type 09/02/2018     Priority: Medium     Dyspepsia 09/02/2018     Priority: Medium     Near syncope 09/02/2018     Priority: Medium     Multiple nevi 07/24/2018     Priority: Medium     Cherry angioma 07/24/2018     Priority: Medium     Screening for skin cancer 07/24/2018     Priority: Medium     CARDIOVASCULAR SCREENING; LDL GOAL LESS THAN 160 09/17/2015     Priority: Medium     High risk HPV infection 09/17/2015     Priority: Medium     Urticaria 09/22/2014     Priority: Medium     Problem list name updated by automated process. Provider to review       Yeast dermatitis 02/21/2014     Priority: Medium     Stress 02/21/2014     Priority: Medium     Mild major depression (H) 08/07/2013     Priority: Medium     Breast cancer screening 07/16/2012     Priority: Medium      Past Medical History:   Diagnosis Date     Ankylosing spondylitis (H)      HLAB19 +     Complication of anesthesia      Depressive disorder college, young adult    resolved for many years     Eosinophilic cellulitis      Mild major depression (H)      Osteopenia     -2.1; Lumbar -2.5; Fem Neck      Sleep apnea      cpap     Small intestinal bacterial overgrowth 05/2017    has had for years but finally diagnosed correctly May 2017     Vitamin deficiency      Past Surgical History:   Procedure Laterality Date     ARTHROSCOPY KNEE RT/LT Right 2004     BIOPSY  Dec. 2016    spongiotic dermatitis     COLONOSCOPY  May 2015    normal     FOOT SURGERY Right 1995    due to foot injury     FOOT SURGERY Right 2000    ayers's neuroma     LASER YAG CAPSULOTOMY Bilateral 12/29/15     PHACOEMULSIFICATION CLEAR CORNEA WITH STANDARD INTRAOCULAR LENS IMPLANT Right 10/1/14     PHACOEMULSIFICATION CLEAR CORNEA WITH STANDARD INTRAOCULAR LENS IMPLANT Left 10/08/14     SOFT TISSUE SURGERY  1995    multiple crush & fx's to right foot     Current Outpatient Medications   Medication Sig Dispense Refill     Acetylcysteine ( PO) Take by mouth daily       Alpha-Lipoic Acid 200 MG TABS Take by mouth daily        cholecalciferol (VITAMIN D3) 125 MCG (5000 UT) TABS tablet Take by mouth daily       Cyanocobalamin (VITAMIN B-12 IJ)        Evening Primrose Oil 500 MG CAPS Take by mouth daily       Inositol 500 MG TABS Take by mouth daily       Menaquinone-7 (VITAMIN K2 PO)        Nutritional Supplements (DHEA PO) Take 5 mg by mouth daily       nystatin (MYCOSTATIN) 407425 units TABS tablet 3 times daily        Omega-3 Fatty Acids (OMEGA-3 CF PO) Take 1,280 mg by mouth daily       UNABLE TO FIND daily MEDICATION NAME: Butyrate 300 mg soft gel       UNABLE TO FIND MEDICATION NAME: Progesterone drops - compounded-  200mg-ML suspension - 6 drops at bedtime- apply topically       UNABLE TO FIND 2 times daily MEDICATION NAME: Estradiol, Estriol drops 30mg/ML suspension - 3 drops topically       UNABLE TO FIND MEDICATION NAME: magnesium l threonate, 1 scoop bid       UNABLE TO FIND MEDICATION NAME: reyene max liquid choline 120 mg & solicon 6 mg-6 drops         Allergies   Allergen Reactions     Floraquin [Iodoquinol] Anaphylaxis     LOST SENSE OF SMELL AND  TASTE     Quinolones      Other reaction(s): Other - Describe In Comment Field  Lost sense of smell.  Pt would prefer to not take any of the medications in Fluoroquninolone group of antibiotics.     Cephalexin Rash     Cortisone Rash     Keflex [Cephalexin Monohydrate] Rash     Penicillins Rash     Triamcinolone Rash     Pt reports rash starting on face then spreading to arms, legs, and truck a few days after a knee injection        Social History     Tobacco Use     Smoking status: Never Smoker     Smokeless tobacco: Never Used   Substance Use Topics     Alcohol use: Yes     Comment: once every several months     History   Drug Use No         Objective     LMP  (LMP Unknown)     Physical Exam    GENERAL APPEARANCE: healthy, alert and no distress     EYES: EOMI,  PERRL     HENT: ear canals and TM's normal and nose and mouth without ulcers or lesions     RESP: lungs clear to auscultation - no rales, rhonchi or wheezes     CV: irregularly irregular rhythm     ABDOMEN:  soft, nontender, no HSM or masses and bowel sounds normal     Rectal exam: not examined     MS: No peripheral edema      SKIN: no suspicious lesions or rashes     PSYCH: mentation appears normal. and affect normal/bright    Recent Labs   Lab Test 04/23/21  1414 11/19/20  0821 03/13/20  0820 02/06/20  1005   HGB 15.4  --   --  14.2     --   --  260   NA  --  139 139  --    POTASSIUM  --  3.7 4.3  --    CR  --  0.93 0.94  --    A1C  --  5.5  --   --         Diagnostics:  No labs were ordered during this visit.   EKG: atrial fibrillation, rate <100, unchanged from previous tracings         Signed Electronically by: Sanjeev Olvera PA-C  Copy of this evaluation report is provided to requesting physician.

## 2021-05-15 LAB
LABORATORY COMMENT REPORT: NORMAL
SARS-COV-2 RNA RESP QL NAA+PROBE: NEGATIVE
SPECIMEN SOURCE: NORMAL

## 2021-05-25 ENCOUNTER — TRANSFERRED RECORDS (OUTPATIENT)
Dept: HEALTH INFORMATION MANAGEMENT | Facility: CLINIC | Age: 76
End: 2021-05-25

## 2021-05-26 ENCOUNTER — OFFICE VISIT (OUTPATIENT)
Dept: CARDIOLOGY | Facility: CLINIC | Age: 76
End: 2021-05-26
Attending: PHYSICIAN ASSISTANT
Payer: COMMERCIAL

## 2021-05-26 VITALS
OXYGEN SATURATION: 100 % | DIASTOLIC BLOOD PRESSURE: 94 MMHG | SYSTOLIC BLOOD PRESSURE: 143 MMHG | HEIGHT: 64 IN | BODY MASS INDEX: 21.32 KG/M2 | HEART RATE: 100 BPM | WEIGHT: 124.9 LBS

## 2021-05-26 DIAGNOSIS — I48.91 ATRIAL FIBRILLATION, UNSPECIFIED TYPE (H): ICD-10-CM

## 2021-05-26 DIAGNOSIS — G47.30 SLEEP APNEA: Primary | ICD-10-CM

## 2021-05-26 PROCEDURE — 93000 ELECTROCARDIOGRAM COMPLETE: CPT | Performed by: INTERNAL MEDICINE

## 2021-05-26 PROCEDURE — 99203 OFFICE O/P NEW LOW 30 MIN: CPT | Performed by: INTERNAL MEDICINE

## 2021-05-26 RX ORDER — ANTIARTHRITIC COMBINATION NO.2 900 MG
25 TABLET ORAL DAILY
COMMUNITY
End: 2022-03-09

## 2021-05-26 ASSESSMENT — MIFFLIN-ST. JEOR: SCORE: 1051.54

## 2021-05-26 NOTE — LETTER
5/26/2021    Ami Aguilar, APRN CNP  [address] [address]    RE: Shaina Calixto       Dear Colleague,    I had the pleasure of seeing Shaina Calixto in the [facility].    HPI and Plan:   See dictation  [id number]  Orders Placed This Encounter   Procedures     Comprehensive Sleep Study     EKG 12-lead complete w/read - Clinics (performed today)     Leadless EKG Monitor 3 to 7 Days     Echocardiogram Complete       Orders Placed This Encounter   Medications     dhea 25 MG TABS     Sig: Take 25 mg by mouth daily       There are no discontinued medications.      Encounter Diagnosis   Name Primary?     Atrial fibrillation, unspecified type (H)        CURRENT MEDICATIONS:  Current Outpatient Medications   Medication Sig Dispense Refill     Alpha-Lipoic Acid 200 MG TABS Take by mouth daily        cholecalciferol (VITAMIN D3) 125 MCG (5000 UT) TABS tablet Take by mouth daily       Cyanocobalamin (VITAMIN B-12 IJ)        dhea 25 MG TABS Take 25 mg by mouth daily       Menaquinone-7 (VITAMIN K2 PO)        Omega-3 Fatty Acids (OMEGA-3 CF PO) Take 1,280 mg by mouth daily       UNABLE TO FIND daily MEDICATION NAME: Butyrate 300 mg soft gel       UNABLE TO FIND MEDICATION NAME: Progesterone drops - compounded-  200mg-ML suspension - 6 drops at bedtime- apply topically       UNABLE TO FIND 2 times daily MEDICATION NAME: Estradiol, Estriol drops 30mg/ML suspension - 3 drops topically       UNABLE TO FIND MEDICATION NAME: magnesium l threonate, 1 scoop bid       UNABLE TO FIND MEDICATION NAME: reyene max liquid choline 120 mg & solicon 6 mg-6 drops       metoprolol succinate ER (TOPROL-XL) 25 MG 24 hr tablet Take 1 tablet (25 mg) by mouth daily (Patient not taking: Reported on 5/26/2021) 90 tablet 0     nystatin (MYCOSTATIN) 894758 units TABS tablet 3 times daily        rivaroxaban ANTICOAGULANT (XARELTO ANTICOAGULANT) 20 MG TABS tablet Take 1 tablet  (20 mg) by mouth daily (with dinner) (Patient not taking: Reported on 5/26/2021) 90 tablet 0       ALLERGIES     Allergies   Allergen Reactions     Floraquin [Iodoquinol] Anaphylaxis     LOST SENSE OF SMELL AND TASTE     Cephalosporins      Quinolones      Other reaction(s): Other - Describe In Comment Field  Lost sense of smell.  Pt would prefer to not take any of the medications in Fluoroquninolone group of antibiotics.     Cephalexin Rash     Cortisone Rash     Keflex [Cephalexin Monohydrate] Rash     Penicillins Rash     Triamcinolone Rash     Pt reports rash starting on face then spreading to arms, legs, and truck a few days after a knee injection       PAST MEDICAL HISTORY:  Past Medical History:   Diagnosis Date     Ankylosing spondylitis (H)      HLAB19 +     Complication of anesthesia      Depressive disorder college, young adult    resolved for many years     Eosinophilic cellulitis      Mild major depression (H)      Osteopenia     -2.1; Lumbar -2.5; Fem Neck      Sleep apnea     cpap     Small intestinal bacterial overgrowth 05/2017    has had for years but finally diagnosed correctly May 2017     Vitamin deficiency        PAST SURGICAL HISTORY:  Past Surgical History:   Procedure Laterality Date     ARTHROSCOPY KNEE RT/LT Right 2004     BIOPSY  Dec. 2016    spongiotic dermatitis     COLONOSCOPY  May 2015    normal     FOOT SURGERY Right 1995    due to foot injury     FOOT SURGERY Right 2000    ayers's neuroma     LASER YAG CAPSULOTOMY Bilateral 12/29/15     PHACOEMULSIFICATION CLEAR CORNEA WITH STANDARD INTRAOCULAR LENS IMPLANT Right 10/1/14     PHACOEMULSIFICATION CLEAR CORNEA WITH STANDARD INTRAOCULAR LENS IMPLANT Left 10/08/14     SOFT TISSUE SURGERY  1995    multiple crush & fx's to right foot       FAMILY HISTORY:  Family History   Problem Relation Age of Onset     Endometrial Cancer Mother 73        only endometrial hyperplasia     Other - See Comments Mother         TIA     Parkinsonism Mother       Hypertension Mother      Thyroid Disease Mother      C.A.D. Maternal Grandfather      Cancer Father         AML     Other Cancer Father      Other Cancer Maternal Half-Brother      Other Cancer Cousin      Other Cancer Paternal Half-Brother      Glaucoma No family hx of      Diabetes No family hx of      Macular Degeneration No family hx of        SOCIAL HISTORY:  Social History     Socioeconomic History     Marital status:      Spouse name: None     Number of children: None     Years of education: None     Highest education level: None   Occupational History     None   Social Needs     Financial resource strain: None     Food insecurity     Worry: None     Inability: None     Transportation needs     Medical: None     Non-medical: None   Tobacco Use     Smoking status: Never Smoker     Smokeless tobacco: Never Used   Substance and Sexual Activity     Alcohol use: Yes     Comment: once every several months     Drug use: No     Sexual activity: Never   Lifestyle     Physical activity     Days per week: None     Minutes per session: None     Stress: None   Relationships     Social connections     Talks on phone: None     Gets together: None     Attends Restoration service: None     Active member of club or organization: None     Attends meetings of clubs or organizations: None     Relationship status: None     Intimate partner violence     Fear of current or ex partner: None     Emotionally abused: None     Physically abused: None     Forced sexual activity: None   Other Topics Concern     Parent/sibling w/ CABG, MI or angioplasty before 65F 55M? No   Social History Narrative    How much exercise per week? Walking    How much calcium per day? Boron and dairy       How much caffeine per day? 1-2 cups coffee    How much vitamin D per day? supplement    Do you/your family wear seatbelts?  Yes    Do you/your family use safety helmets? Yes    Do you/your family use sunscreen? Yes    Do you/your family keep  "firearms in the home? No    Do you/your family have a smoke detector(s)? Yes        Do you feel safe in your home? Yes    Has anyone ever touched you in an unwanted manner? No     Explain          March 26, 2015 Maria G Cardenas LPN               Review of Systems:  Skin:  Negative       Eyes:  Positive for glasses    ENT:  Positive for hearing loss    Respiratory:  Positive for sleep apnea No cpap   Cardiovascular:  Negative      Gastroenterology: Negative      Genitourinary:  not assessed      Musculoskeletal:  Negative      Neurologic:  Positive for numbness or tingling of feet    Psychiatric:  Positive for anxiety    Heme/Lymph/Imm:  Negative      Endocrine:  Negative        Physical Exam:  Vitals: BP (!) 143/94 (BP Location: Left arm, Patient Position: Sitting, Cuff Size: Adult Regular)   Pulse 100   Ht 1.626 m (5' 4\")   Wt 56.7 kg (124 lb 14.4 oz)   LMP  (LMP Unknown)   SpO2 100%   BMI 21.44 kg/m      Constitutional:  cooperative, alert and oriented, well developed, well nourished, in no acute distress        Skin:  warm and dry to the touch, no apparent skin lesions or masses noted          Head:  normocephalic, no masses or lesions        Eyes:           Lymph:      ENT:  no pallor or cyanosis        Neck:  carotid pulses are full and equal bilaterally, JVP normal, no carotid bruit        Respiratory:  normal breath sounds, clear to auscultation, normal A-P diameter, normal symmetry, normal respiratory excursion, no use of accessory muscles         Cardiac:   irregularly irregular rhythm                                                       GI:  abdomen soft, non-tender, BS normoactive, no mass, no HSM, no bruits        Extremities and Muscular Skeletal:  no deformities, clubbing, cyanosis, erythema observed              Neurological:  no gross motor deficits        Psych:         Thank you for allowing me to participate in the care of your patient.      Sincerely,     Kavon Brown MD     [facility] " [facility]    cc:   Sanjeev Olvera PA-C  [address] [facility] [place] [address]

## 2021-05-26 NOTE — PROGRESS NOTES
HPI and Plan:   See dictation  [id number]  Orders Placed This Encounter   Procedures     Comprehensive Sleep Study     EKG 12-lead complete w/read - Clinics (performed today)     Leadless EKG Monitor 3 to 7 Days     Echocardiogram Complete       Orders Placed This Encounter   Medications     dhea 25 MG TABS     Sig: Take 25 mg by mouth daily       There are no discontinued medications.      Encounter Diagnosis   Name Primary?     Atrial fibrillation, unspecified type (H)        CURRENT MEDICATIONS:  Current Outpatient Medications   Medication Sig Dispense Refill     Alpha-Lipoic Acid 200 MG TABS Take by mouth daily        cholecalciferol (VITAMIN D3) 125 MCG (5000 UT) TABS tablet Take by mouth daily       Cyanocobalamin (VITAMIN B-12 IJ)        dhea 25 MG TABS Take 25 mg by mouth daily       Menaquinone-7 (VITAMIN K2 PO)        Omega-3 Fatty Acids (OMEGA-3 CF PO) Take 1,280 mg by mouth daily       UNABLE TO FIND daily MEDICATION NAME: Butyrate 300 mg soft gel       UNABLE TO FIND MEDICATION NAME: Progesterone drops - compounded-  200mg-ML suspension - 6 drops at bedtime- apply topically       UNABLE TO FIND 2 times daily MEDICATION NAME: Estradiol, Estriol drops 30mg/ML suspension - 3 drops topically       UNABLE TO FIND MEDICATION NAME: magnesium l threonate, 1 scoop bid       UNABLE TO FIND MEDICATION NAME: reyene max liquid choline 120 mg & solicon 6 mg-6 drops       metoprolol succinate ER (TOPROL-XL) 25 MG 24 hr tablet Take 1 tablet (25 mg) by mouth daily (Patient not taking: Reported on 5/26/2021) 90 tablet 0     nystatin (MYCOSTATIN) 902015 units TABS tablet 3 times daily        rivaroxaban ANTICOAGULANT (XARELTO ANTICOAGULANT) 20 MG TABS tablet Take 1 tablet (20 mg) by mouth daily (with dinner) (Patient not taking: Reported on 5/26/2021) 90 tablet 0       ALLERGIES     Allergies   Allergen Reactions     Floraquin [Iodoquinol] Anaphylaxis     LOST SENSE OF SMELL AND TASTE     Cephalosporins      Quinolones       Other reaction(s): Other - Describe In Comment Field  Lost sense of smell.  Pt would prefer to not take any of the medications in Fluoroquninolone group of antibiotics.     Cephalexin Rash     Cortisone Rash     Keflex [Cephalexin Monohydrate] Rash     Penicillins Rash     Triamcinolone Rash     Pt reports rash starting on face then spreading to arms, legs, and truck a few days after a knee injection       PAST MEDICAL HISTORY:  Past Medical History:   Diagnosis Date     Ankylosing spondylitis (H)      HLAB19 +     Complication of anesthesia      Depressive disorder [facility], young adult    resolved for many years     Eosinophilic cellulitis      Mild major depression (H)      Osteopenia     -2.1; Lumbar -2.5; Fem Neck      Sleep apnea     cpap     Small intestinal bacterial overgrowth 05/2017    has had for years but finally diagnosed correctly May 2017     Vitamin deficiency        PAST SURGICAL HISTORY:  Past Surgical History:   Procedure Laterality Date     ARTHROSCOPY KNEE RT/LT Right 2004     BIOPSY  Dec. 2016    spongiotic dermatitis     COLONOSCOPY  May 2015    normal     FOOT SURGERY Right 1995    due to foot injury     FOOT SURGERY Right 2000    ayers's neuroma     LASER YAG CAPSULOTOMY Bilateral 12/29/15     PHACOEMULSIFICATION CLEAR CORNEA WITH STANDARD INTRAOCULAR LENS IMPLANT Right 10/1/14     PHACOEMULSIFICATION CLEAR CORNEA WITH STANDARD INTRAOCULAR LENS IMPLANT Left 10/08/14     SOFT TISSUE SURGERY  1995    multiple crush & fx's to right foot       FAMILY HISTORY:  Family History   Problem Relation Age of Onset     Endometrial Cancer Mother 73        only endometrial hyperplasia     Other - See Comments Mother         TIA     Parkinsonism Mother      Hypertension Mother      Thyroid Disease Mother      C.A.D. Maternal Grandfather      Cancer Father         AML     Other Cancer Father      Other Cancer Maternal Half-Brother      Other Cancer Cousin      Other Cancer Paternal Half-Brother       Glaucoma No family hx of      Diabetes No family hx of      Macular Degeneration No family hx of        SOCIAL HISTORY:  Social History     Socioeconomic History     Marital status:      Spouse name: None     Number of children: None     Years of education: None     Highest education level: None   Occupational History     None   Social Needs     Financial resource strain: None     Food insecurity     Worry: None     Inability: None     Transportation needs     Medical: None     Non-medical: None   Tobacco Use     Smoking status: Never Smoker     Smokeless tobacco: Never Used   Substance and Sexual Activity     Alcohol use: Yes     Comment: once every several months     Drug use: No     Sexual activity: Never   Lifestyle     Physical activity     Days per week: None     Minutes per session: None     Stress: None   Relationships     Social connections     Talks on phone: None     Gets together: None     Attends Hinduism service: None     Active member of club or organization: None     Attends meetings of clubs or organizations: None     Relationship status: None     Intimate partner violence     Fear of current or ex partner: None     Emotionally abused: None     Physically abused: None     Forced sexual activity: None   Other Topics Concern     Parent/sibling w/ CABG, MI or angioplasty before 65F 55M? No   Social History Narrative    How much exercise per week? Walking    How much calcium per day? Boron and dairy       How much caffeine per day? 1-2 cups coffee    How much vitamin D per day? supplement    Do you/your family wear seatbelts?  Yes    Do you/your family use safety helmets? Yes    Do you/your family use sunscreen? Yes    Do you/your family keep firearms in the home? No    Do you/your family have a smoke detector(s)? Yes        Do you feel safe in your home? Yes    Has anyone ever touched you in an unwanted manner? No     Explain          March 26, 2015 Maria G Cardenas LPN               Review of  "Systems:  Skin:  Negative       Eyes:  Positive for glasses    ENT:  Positive for hearing loss    Respiratory:  Positive for sleep apnea No cpap   Cardiovascular:  Negative      Gastroenterology: Negative      Genitourinary:  not assessed      Musculoskeletal:  Negative      Neurologic:  Positive for numbness or tingling of feet    Psychiatric:  Positive for anxiety    Heme/Lymph/Imm:  Negative      Endocrine:  Negative        Physical Exam:  Vitals: BP (!) 143/94 (BP Location: Left arm, Patient Position: Sitting, Cuff Size: Adult Regular)   Pulse 100   Ht 1.626 m (5' 4\")   Wt 56.7 kg (124 lb 14.4 oz)   LMP  (LMP Unknown)   SpO2 100%   BMI 21.44 kg/m      Constitutional:  cooperative, alert and oriented, well developed, well nourished, in no acute distress        Skin:  warm and dry to the touch, no apparent skin lesions or masses noted          Head:  normocephalic, no masses or lesions        Eyes:           Lymph:      ENT:  no pallor or cyanosis        Neck:  carotid pulses are full and equal bilaterally, JVP normal, no carotid bruit        Respiratory:  normal breath sounds, clear to auscultation, normal A-P diameter, normal symmetry, normal respiratory excursion, no use of accessory muscles         Cardiac:   irregularly irregular rhythm                                                       GI:  abdomen soft, non-tender, BS normoactive, no mass, no HSM, no bruits        Extremities and Muscular Skeletal:  no deformities, clubbing, cyanosis, erythema observed              Neurological:  no gross motor deficits        Psych:           CC  Sanjeev Olvera, PAAngelaC  [address] [facility] [address]              "

## 2021-05-27 NOTE — PROGRESS NOTES
Service Date: 05/26/2021    Thank you for allowing me to participate in the care of your very delightful patient.  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    As you may recall, Shaina has been seeing Colorectal Surgery for an ischiorectal abscess, which requires I&D.  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 She was seen for preoperative evaluation and EKG was done showing atrial fibrillation with a ventricular rate of 97 beats per minute.    Shaina has no symptoms with atrial fibrillation including palpitations, shortness of breath, orthopnea, PND, chest pain, chest discomfort.  She remains very active despite dealing with pain from the current situation.  The patient was recommended to be on Xarelto and metoprolol but declined to take it until she gets more information.  She stated that she paid close to $200 for 14 days' worth of Xarelto.    We discussed at length about the potential cause of atrial fibrillation.  In this case, it is probably due to advanced age.  She does have sleep apnea but did not tolerate CPAP in the past.  She had a TSH level checked last November and it was within normal range.    We then discussed potential complications including tachycardia-induced cardiomyopathy and CVA, which is about 2% a year for her given her age and being a female, although it will be 3 next year when she turns 76.  Lastly, we discussed treatment options including one attempt to restore sinus rhythm with a cardioversion versus just rate control.  Shaina is interested in the latter.      In the meantime, I would like to obtain an echocardiogram to ensure she has normal cardiac structure and function and also give us a hint of the duration of her atrial fibrillation as the longer that  she has been in atrial fibrillation the likelihood we will find left atrial remodeling.  By that, I mean the left atrium will be enlarged to accommodate the chronicity of her atrial fibrillation.  I also would like to obtain a cardiac monitor to ensure that 1) she does not have paroxysmal atrial fibrillation and 2) to assess for the average rate during atrial fibrillation.  Lastly, I encouraged the patient to have another sleep study as it has been some time since her last one to see the severity of her sleep apnea as sleep apnea is associated with atrial fibrillation.    In the meantime, regarding the need for her anal fistula surgery, I see no reason why she could not proceed with it as overall cardiac risk is quite low.  If she decided to start anticoagulation, she can start any time and hold it at least 2-3 days before the procedure and resume it when it is deemed safe by Surgery.  On the other hand, if Shaina decided that she was willing to take the 2% risk of having a stroke a year without taking anticoagulation., she can do so.  She did ask about the effectiveness of an aspirin.  I told her that since aspirin is antiplatelets only it would not hurt if she can tolerate it, but the patient has a lot of GI issues with taking NSAIDs in the past and she is afraid that she may have the same issues with aspirin.  I will let her decide whether she should take aspirin or not.  We will call the patient regarding results of the above tests and plan going forward.    Kavon Blackman MD        D: 2021   T: 2021   MT: [email or web address]    Name:     SHAINA MARIE  MRN:      [id number]        Account:      [id number]   :      1945           Service Date: 2021       Document: [id number]

## 2021-06-07 ENCOUNTER — HOSPITAL ENCOUNTER (OUTPATIENT)
Dept: CARDIOLOGY | Facility: CLINIC | Age: 76
Discharge: HOME | End: 2021-06-07
Attending: INTERNAL MEDICINE | Admitting: INTERNAL MEDICINE
Payer: COMMERCIAL

## 2021-06-07 DIAGNOSIS — I48.91 ATRIAL FIBRILLATION, UNSPECIFIED TYPE (H): ICD-10-CM

## 2021-06-07 PROCEDURE — 93306 TTE W/DOPPLER COMPLETE: CPT | Mod: 26 | Performed by: INTERNAL MEDICINE

## 2021-06-07 PROCEDURE — 93306 TTE W/DOPPLER COMPLETE: CPT

## 2021-06-11 ENCOUNTER — HOSPITAL ENCOUNTER (OUTPATIENT)
Dept: CARDIOLOGY | Facility: CLINIC | Age: 76
Discharge: HOME | End: 2021-06-11
Attending: INTERNAL MEDICINE | Admitting: INTERNAL MEDICINE
Payer: COMMERCIAL

## 2021-06-11 DIAGNOSIS — I48.91 ATRIAL FIBRILLATION, UNSPECIFIED TYPE (H): ICD-10-CM

## 2021-06-11 PROCEDURE — 93242 EXT ECG>48HR<7D RECORDING: CPT

## 2021-06-11 PROCEDURE — 93244 EXT ECG>48HR<7D REV&INTERPJ: CPT | Performed by: INTERNAL MEDICINE

## 2021-06-30 ENCOUNTER — VIRTUAL VISIT (OUTPATIENT)
Dept: SLEEP MEDICINE | Facility: CLINIC | Age: 76
End: 2021-06-30
Attending: INTERNAL MEDICINE
Payer: COMMERCIAL

## 2021-06-30 VITALS — BODY MASS INDEX: 21.17 KG/M2 | HEIGHT: 64 IN | WEIGHT: 124 LBS

## 2021-06-30 DIAGNOSIS — G47.33 OSA (OBSTRUCTIVE SLEEP APNEA): Primary | ICD-10-CM

## 2021-06-30 DIAGNOSIS — G25.81 RESTLESS LEGS SYNDROME (RLS): ICD-10-CM

## 2021-06-30 DIAGNOSIS — I48.91 ATRIAL FIBRILLATION, UNSPECIFIED TYPE (H): ICD-10-CM

## 2021-06-30 PROCEDURE — 99205 OFFICE O/P NEW HI 60 MIN: CPT | Mod: 95 | Performed by: INTERNAL MEDICINE

## 2021-06-30 ASSESSMENT — MIFFLIN-ST. JEOR: SCORE: 1047.46

## 2021-06-30 NOTE — PROGRESS NOTES
Does patient have any form of state insurance? TAMMI Merritt is a 75 year old who is being evaluated via a billable video visit.      How would you like to obtain your AVS? MyChart  If the video visit is dropped, the invitation should be resent by: Send to e-mail at: [email or web address]  Will anyone else be joining your video visit? Maris Romero, KENDALL on 6/30/2021 at 10:36 AM    Video-Visit Details    Type of service:  Video Visit  Video Start: 06/30/2021 11:06 am  Video Stop: 06/30/2021 11:58 am    Originating Location (pt. Location): Home    Distant Location (provider location):  [facility]     Platform used for Video Visit: [facility]  Sleep Consultation Note     Date on this visit: 6/30/2021    Shaina Calixto is sent by Kavon Brown for a sleep consultation regarding obtaining evaluation of previously diagnosed SABRA in the setting of atrial fibrillation.    Primary Physician: Ami Aguilar Ra     Chief complaint: Currently using dental appliance for SABRA. Reevaluation of the sleep apnea due to atrial fibrillation.    Shaina Calixto is a pleasant 75 year old female with PMH of severe SABRA, atrial fibrillation who presents to sleep clinic today for a virtual visit to obtain reevaluation for previously diagnosed SABRA which is currently being treated with dental appliance in the setting of atrial fibrillation.     Weight at the time of PSG in 2005 158 lbs. Current weight is 124 lbs).    Previous sleep study reports:  The sleep reports from [facility] [place](dated December 2004) indicated that she was diagnosed with sleep apnea on May 17, 1999 by an ENT provider in [facility] and was noted to have RDI of 43 events per hour.    Diagnostic polysomnography through [facility] [place] on January 4, 2005:  Weight at the time of the study: 158 pounds  All sleep stages were observed.  AHI was 5 events per hour.  RDI was 9 events per hour.  Frequent periodic limb movements were  observed.  Patient was recommended CPAP at pressure setting of 7 cmH2O following the study.    Following the sleep study she was prescribed CPAP.  She tried using the CPAP for the first year, but discontinued treatment due to intolerance.  She obtained dental appliance through Dr. Agapito Orlando.   She has been following up with Dr. Orlando regularly.  She reports using the dental appliance regularly during sleep.  She sleeps alone. A couple years ago when she went camping with her friend, she was told that she snored gently with dental device.  The only beef she has about the dental appliance is dry mouth  There are no reports of gasping or choking with dental appliance  She denies morning headaches.  She denies non-refreshing sleep, daytime sleepiness/fatigue.    Sleep Schedule/Sleep Complaints//Daytime Functioning  Shaina goes to bed around 1230 AM and wakes up at 8/830AM.  It usually takes 10-15 minutes to fall asleep.  She wakes up 2 times throughout the night.  Night time awakenings occurs to use bathrrom  After awakening, She is able to fall back asleep quickly.  She denies non-refreshing sleep, daytime sleepiness/fatigue.  She is a night person  Patient reports occasional drowsiness while driving long distance. Denies close calls or accidents due to drowsy driving.  Shaina does not nap.  Patient does not use electronics in bed.    SLEEP SCALES:  Patient's [place] Sleepiness score 1/24   Insomnia severity index:1    Restless Legs symptoms  Shaina reports h/o RLS in the legs. Symptoms have improved with  Accpuncture treatment.  She may still have symptoms once every 2-3 months during evening time, while watching TV, but the symptoms do not last long and do not interfere with her sleep.    Sleep Behaviors  She denies any night time behaviors - sleep walking, sleep talking, sleep eating.  She does not complain acting out dreams.    Social History  Shaina is retired. Lives alone. She does not usually drink  caffeinated beverages. Last caffeine intake is  not within 6 hours of bed time.  She does not drink alcohol. Patient is a never smoker. Patient does not use drugs. she reported using recreational marijuana in past 8-10 years ago.    Allergies:    Allergies   Allergen Reactions     Floraquin [Iodoquinol] Anaphylaxis     LOST SENSE OF SMELL AND TASTE     Cephalosporins      Quinolones      Other reaction(s): Other - Describe In Comment Field  Lost sense of smell.  Pt would prefer to not take any of the medications in Fluoroquninolone group of antibiotics.     Cephalexin Rash     Cortisone Rash     Keflex [Cephalexin Monohydrate] Rash     Penicillins Rash     Triamcinolone Rash     Pt reports rash starting on face then spreading to arms, legs, and truck a few days after a knee injection       Medications:    Current Outpatient Medications   Medication Sig Dispense Refill     Alpha-Lipoic Acid 200 MG TABS Take by mouth daily        cholecalciferol (VITAMIN D3) 125 MCG (5000 UT) TABS tablet Take by mouth daily       Cyanocobalamin (VITAMIN B-12 IJ)        dhea 25 MG TABS Take 25 mg by mouth daily       Menaquinone-7 (VITAMIN K2 PO)        metoprolol succinate ER (TOPROL-XL) 25 MG 24 hr tablet Take 1 tablet (25 mg) by mouth daily 90 tablet 0     nystatin (MYCOSTATIN) 321404 units TABS tablet 3 times daily        Omega-3 Fatty Acids (OMEGA-3 CF PO) Take 1,280 mg by mouth daily       rivaroxaban ANTICOAGULANT (XARELTO ANTICOAGULANT) 20 MG TABS tablet Take 1 tablet (20 mg) by mouth daily (with dinner) 90 tablet 0     UNABLE TO FIND daily MEDICATION NAME: Butyrate 300 mg soft gel       UNABLE TO FIND MEDICATION NAME: Progesterone drops - compounded-  200mg-ML suspension - 6 drops at bedtime- apply topically       UNABLE TO FIND 2 times daily MEDICATION NAME: Estradiol, Estriol drops 30mg/ML suspension - 3 drops topically       UNABLE TO FIND MEDICATION NAME: magnesium l threonate, 1 scoop bid       UNABLE TO FIND MEDICATION  NAME: reyene max liquid choline 120 mg & solicon 6 mg-6 drops         Problem List:  Patient Active Problem List    Diagnosis Date Noted     Restless legs syndrome (RLS) 09/02/2018     Priority: Medium     Plantar fasciitis 09/02/2018     Priority: Medium     HPV (human papilloma virus) infection 09/02/2018     Priority: Medium     Raynaud's disease without gangrene 09/02/2018     Priority: Medium     SABRA (obstructive sleep apnea) 09/02/2018     Priority: Medium     Age-related osteoporosis without current pathological fracture 09/02/2018     Priority: Medium     Eczema, unspecified type 09/02/2018     Priority: Medium     Dyspepsia 09/02/2018     Priority: Medium     Near syncope 09/02/2018     Priority: Medium     Multiple nevi 07/24/2018     Priority: Medium     Cherry angioma 07/24/2018     Priority: Medium     Screening for skin cancer 07/24/2018     Priority: Medium     CARDIOVASCULAR SCREENING; LDL GOAL LESS THAN 160 09/17/2015     Priority: Medium     High risk HPV infection 09/17/2015     Priority: Medium     Urticaria 09/22/2014     Priority: Medium     Problem list name updated by automated process. Provider to review       Yeast dermatitis 02/21/2014     Priority: Medium     Stress 02/21/2014     Priority: Medium     Mild major depression (H) 08/07/2013     Priority: Medium     Breast cancer screening 07/16/2012     Priority: Medium        Past Medical/Surgical History:  Past Medical History:   Diagnosis Date     Ankylosing spondylitis (H)      HLAB19 +     Complication of anesthesia      Depressive disorder college, young adult    resolved for many years     Eosinophilic cellulitis      Mild major depression (H)      Osteopenia     -2.1; Lumbar -2.5; Fem Neck      Sleep apnea     cpap     Small intestinal bacterial overgrowth 05/2017    has had for years but finally diagnosed correctly May 2017     Vitamin deficiency      Past Surgical History:   Procedure Laterality Date     ARTHROSCOPY KNEE RT/LT  Right 2004     BIOPSY  Dec. 2016    spongiotic dermatitis     COLONOSCOPY  May 2015    normal     FOOT SURGERY Right 1995    due to foot injury     FOOT SURGERY Right 2000    ayers's neuroma     LASER YAG CAPSULOTOMY Bilateral 12/29/15     PHACOEMULSIFICATION CLEAR CORNEA WITH STANDARD INTRAOCULAR LENS IMPLANT Right 10/1/14     PHACOEMULSIFICATION CLEAR CORNEA WITH STANDARD INTRAOCULAR LENS IMPLANT Left 10/08/14     SOFT TISSUE SURGERY  1995    multiple crush & fx's to right foot       Social History:  Social History     Socioeconomic History     Marital status:      Spouse name: Not on file     Number of children: Not on file     Years of education: Not on file     Highest education level: Not on file   Occupational History     Not on file   Social Needs     Financial resource strain: Not on file     Food insecurity     Worry: Not on file     Inability: Not on file     Transportation needs     Medical: Not on file     Non-medical: Not on file   Tobacco Use     Smoking status: Never Smoker     Smokeless tobacco: Never Used   Substance and Sexual Activity     Alcohol use: Yes     Comment: once every several months     Drug use: No     Sexual activity: Never   Lifestyle     Physical activity     Days per week: Not on file     Minutes per session: Not on file     Stress: Not on file   Relationships     Social connections     Talks on phone: Not on file     Gets together: Not on file     Attends Oriental orthodox service: Not on file     Active member of club or organization: Not on file     Attends meetings of clubs or organizations: Not on file     Relationship status: Not on file     Intimate partner violence     Fear of current or ex partner: Not on file     Emotionally abused: Not on file     Physically abused: Not on file     Forced sexual activity: Not on file   Other Topics Concern     Parent/sibling w/ CABG, MI or angioplasty before 65F 55M? No   Social History Narrative    How much exercise per week? Walking     How much calcium per day? Boron and dairy       How much caffeine per day? 1-2 cups coffee    How much vitamin D per day? supplement    Do you/your family wear seatbelts?  Yes    Do you/your family use safety helmets? Yes    Do you/your family use sunscreen? Yes    Do you/your family keep firearms in the home? No    Do you/your family have a smoke detector(s)? Yes        Do you feel safe in your home? Yes    Has anyone ever touched you in an unwanted manner? No     Explain          March 26, 2015 Maria G Cardenas LPN               Family History:  Family history pertinent to sleep disorders: unknown  Family History   Problem Relation Age of Onset     Endometrial Cancer Mother 73        only endometrial hyperplasia     Other - See Comments Mother         TIA     Parkinsonism Mother      Hypertension Mother      Thyroid Disease Mother      C.A.D. Maternal Grandfather      Cancer Father         AML     Other Cancer Father      Other Cancer Maternal Half-Brother      Other Cancer Cousin      Other Cancer Paternal Half-Brother      Glaucoma No family hx of      Diabetes No family hx of      Macular Degeneration No family hx of        Review of Systems:  A complete review of systems reviewed by me is negative with the exeption of what has been mentioned in the history of present illness,  and symptoms listed below, highlighted in red:  CONSTITUTIONAL: weight gain/loss, fever, chills, sweats or night sweats, drug allergies.  EYES:  changes in vision, blind spots, double vision.  ENT: ear pain, sore throat, sinus pain, post-nasal drip, runny nose, bloody nose  CARDIAC:  fast heartbeats or fluttering in chest, chest pain or chest feels heavy, not pressure, breathlessness when lying flat, swollen legs or swollen feet.  NEUROLOGIC: headaches, weakness or numbness in the arms or legs.   DERMATOLOGIC:  rashes, new moles or change in mole(s)  PULMONARY: SOB at rest, SOB with activity, dry cough, productive cough, coughing up  "blood, wheezing or whistling when breathing.    GASTROINTESTINAL:  nausea or vomitting, loose or watery stools, fat or grease in stools, constipation, abdominal pain, bowel movements black in color or blood noted.  GENITOURINARY: pain during urination, blood in urine, urinating more frequently than usual  MUSCULOSKELETAL:muscle pain, bone or joint pain, swollen joints.  ENDOCRINE: increased thirst or urination, diabetes.  LYMPHATICS: swollen lymph nodes, puffiness under eyes, lumps or bumps in the breasts or nipple discharge.  PSYCHE: depression, anxiety under control , denies suicidal ideations/thoughts of harming others    Physical Examination:  Vitals: Ht 1.626 m (5' 4\")   Wt 56.2 kg (124 lb)   LMP  (LMP Unknown)   BMI 21.28 kg/m    BMI= Body mass index is 21.28 kg/m .         [place] Total Score 6/30/2021   Total score - [place] 1     General: No apparent distress, appropriately groomed  Head: Normocephalic, atraumatic  Chest: No cough, no audible wheezing, able to talk in full sentences  Psych: coherent speech, normal rate and volume, able to articulate logical thoughts, able   to abstract reason, no tangential thoughts, no hallucinations   or delusions  Her affect is normal  Neuro:  Mental status: Alert and  Oriented X 3  Speech: normal         OTHER TESTS/LABS:   I have reviewed the labs and made my comment in the assessment and plan.  ECHO(6/7/21)Interpretation Summary     The rhythm was atrial fibrillation.     1. Normal left ventricular size and systolic function. Estimated LVEF 60-65%.  2. Normal right ventricular size and systolic function.  3. Severe bi-atrial enlargement.  4. Moderate functional mitral valve regurgitation. Effective regurgitant  orifice area 0.4 cmÂ , MR volume 36 mL.  5. Moderate functional tricuspid valve regurgitation. Right ventricle systolic  pressure estimate normal.  6. Normal inferior vena cava.         Impression/Plan:  Previously diagnosed mild obstructive sleep apnea in " the setting of atrial fibrillation (previous history of CPAP intolerance), currently being treated with dental appliance.  Patient reports good compliance with the dental appliance.  She denies awakenings due to gasping for air or choking or nonrestorative sleep or fatigue or EDS.  When I reviewed the previous sleep study report  from January 2005, she was noted to have very mild borderline SABRA with AHI of 5 events per hour.  She has lost weight since then, which makes it plausible that the SABRA may have resolved with the weight loss, since it was very mild to begin with.  HST/PSG were reviewed and patient was interested in obtaining PSG.  In order to re-evaluate for the overall severity of the sleep apnea, recommend obtaining an all-night diagnostic polysomnography (without the dental appliance).  The results of the study will be communicated with patient via MyChart and also via video visit(per patient's request) in 7 to 10 days after the sleep study is completed.   Patient was willing to consider CPAP treatment if it is indicated.     Restless legs syndrome: The symptoms are much better now since she started acupuncture and they are very sporadic and she is not concerned about the symptoms.  She was instructed to get back to me if there is any recurrence of the symptoms/increase in the frequency or severity.     Atrial fibrillation: We discussed the association of SABRA with atrial fibrillation, other arrhythmias and cardiovascular disease. She will continue follow-up with her cardiologist.    Encouraged  healthy diet, and exercise.    Patient was strongly advised to avoid driving, operating any heavy machinery or other hazardous situations while drowsy or sleepy.  Patient was counseled on the importance of driving while alert, to pull over if drowsy, or nap before getting into the vehicle if sleepy.        Plan is to communicate results of sleep study in 7-10 days.     CC: Kavon Brown        The above note was  "dictated using voice recognition software. Although reviewed after completion, some word and grammatical error may remain . Please contact the author for any clarifications.    \"I spent a total of 60 minuteswith Shaina Calixto during today's  video  Visit, most of this time was spent counseling the patient and  coordinating care regarding  SABRA, HST/PSG, dental appliance for sleep apnea, association of SABRA with atrial fibrillation and other cardiovascular disease, restless leg symptoms,, going over reports of previous sleep study and chart review., including documentation and further activities as noted above.\"     Radha Borja MD  [facility] [address] [address] [phone]  Dept: [phone]                        "

## 2021-06-30 NOTE — PATIENT INSTRUCTIONS
Your BMI is Body mass index is 21.28 kg/m .  Weight management is a personal decision.  If you are interested in exploring weight loss strategies, the following discussion covers the approaches that may be successful. Body mass index (BMI) is one way to tell whether you are at a healthy weight, overweight, or obese. It measures your weight in relation to your height.  A BMI of 18.5 to 24.9 is in the healthy range. A person with a BMI of 25 to 29.9 is considered overweight, and someone with a BMI of 30 or greater is considered obese. More than two-thirds of American adults are considered overweight or obese.  Being overweight or obese increases the risk for further weight gain. Excess weight may lead to heart disease and diabetes.  Creating and following plans for healthy eating and physical activity may help you improve your health.  Weight control is part of healthy lifestyle and includes exercise, emotional health, and healthy eating habits. Careful eating habits lifelong are the mainstay of weight control. Though there are significant health benefits from weight loss, long-term weight loss with diet alone may be very difficult to achieve- studies show long-term success with dietary management in less than 10% of people. Attaining a healthy weight may be especially difficult to achieve in those with severe obesity. In some cases, medications, devices and surgical management might be considered.  What can you do?  If you are overweight or obese and are interested in methods for weight loss, you should discuss this with your provider.     Consider reducing daily calorie intake by 500 calories.     Keep a food journal.     Avoiding skipping meals, consider cutting portions instead.    Diet combined with exercise helps maintain muscle while optimizing fat loss. Strength training is particularly important for building and maintaining muscle mass. Exercise helps reduce stress, increase energy, and improves fitness.  Increasing exercise without diet control, however, may not burn enough calories to loose weight.       Start walking three days a week 10-20 minutes at a time    Work towards walking thirty minutes five days a week     Eventually, increase the speed of your walking for 1-2 minutes at time    In addition, we recommend that you review healthy lifestyles and methods for weight loss available through the National Institutes of Health patient information sites:  http://win.niddk.nih.gov/publications/index.htm    And look into health and wellness programs that may be available through your health insurance provider, employer, local community center, or jocelyn club.      When I reviewed the previous sleep study report  from January 2005, you had very mild borderline sleep apnea with AHI was 5 events per hour(AHI- apnea-hypopnea index  is the total number of apneas per hour of sleep and AHI >=5 events per hour, qualifies for the diagnosis of sleep apnea. AHI 5-15 per hour is mild sleep apnea; 16 to 30 per hour is moderate sleep apnea and more than 30 per hour is severe sleep apnea.  Since you have  lost weight since the last sleep study,  it plausible that the sleep apnea may have resolved with the weight loss, since it was very mild to begin with.  In order to re-evaluate for the overall severity of the sleep apnea, recommend obtaining an all-night diagnostic sleep study without the dental appliance.  The results of the study will be communicated via [employer] and also via video visit(peryour  request) in 7 to 10 days after the sleep study is completed.   Please continue follow-up with your cardiologist  Restless legs syndrome: You have reported that the symptoms are much better now since you started acupuncture. Please get back to me if there is any recurrence of the symptoms/increase in the frequency or severity.     Atrial fibrillation: We discussed the association of SABRA with atrial fibrillation, other arrhythmias and  cardiovascular disease. She will continue follow-up with her cardiologist.    Encouraged  healthy diet, and exercise.    Patient was strongly advised to avoid driving, operating any heavy machinery or other hazardous situations while drowsy or sleepy.  Patient was counseled on the importance of driving while alert, to pull over if drowsy, or nap before getting into the vehicle if sleepy.        Plan is to communicate results of sleep study in 7-10 days.

## 2021-07-06 ENCOUNTER — TELEPHONE (OUTPATIENT)
Dept: CARDIOLOGY | Facility: CLINIC | Age: 76
End: 2021-07-06

## 2021-07-06 NOTE — TELEPHONE ENCOUNTER
7/6/21 Call recd from EKG stating they recd a call from [facility] because the pts recent heart monitor was returned w no data. .   Spoke w Jalen form [facility] who stated the problem could have been a faulty monitor or that it was never activated. He said they can mail out a new monitor to pts address overnight and there would be no charge to the pt for delivery or re- processing.   Jalen stated that pt should call [facility] customer service # at [phone] when she is ready to apply monitor and they will walk her through the process.  Attempted to call pt to explain but reached DAYLIN ADAME and requested callback  KHerroRN [phone] am

## 2021-07-06 NOTE — TELEPHONE ENCOUNTER
7/6/21 Spoke w pt and explained problem with ZP and that a new one would be mailed out overnight and she should receive today or tomorrow. Informed pt to call [facility] for set up instructions and phone # given.  Pt will call back if monitor does not arrive in the next day or 2.  Pt voiced understanding and agreement with plan.   Yves 1140 am

## 2021-07-09 ENCOUNTER — TELEPHONE (OUTPATIENT)
Dept: SLEEP MEDICINE | Facility: CLINIC | Age: 76
End: 2021-07-09

## 2021-07-09 NOTE — TELEPHONE ENCOUNTER
Reason for Call:  Other appointment    Detailed comments: patient is calling to schedule a sleep study.     Phone Number Patient can be reached at: Cell number on file:    [phone]       Best Time: any    Can we leave a detailed message on this number? YES    Call taken on 7/9/2021 at 12:45 PM by Susy Ramírez

## 2021-07-13 NOTE — TELEPHONE ENCOUNTER
7/13/21 Called pt in follow up. She stated the new monitor has arrived but she has not put in on yet. She plans to in the next couple of days.  Cont to follow.  Yves 140 pm

## 2021-08-05 ENCOUNTER — DOCUMENTATION ONLY (OUTPATIENT)
Dept: SLEEP MEDICINE | Facility: CLINIC | Age: 76
End: 2021-08-05

## 2021-08-05 ENCOUNTER — TRANSFERRED RECORDS (OUTPATIENT)
Dept: HEALTH INFORMATION MANAGEMENT | Facility: CLINIC | Age: 76
End: 2021-08-05

## 2021-08-05 ENCOUNTER — THERAPY VISIT (OUTPATIENT)
Dept: SLEEP MEDICINE | Facility: CLINIC | Age: 76
End: 2021-08-05
Attending: INTERNAL MEDICINE
Payer: COMMERCIAL

## 2021-08-05 DIAGNOSIS — G47.33 OSA (OBSTRUCTIVE SLEEP APNEA): ICD-10-CM

## 2021-08-05 PROCEDURE — 95810 POLYSOM 6/> YRS 4/> PARAM: CPT | Performed by: INTERNAL MEDICINE

## 2021-08-15 NOTE — PROCEDURES
" SLEEP STUDY INTERPRETATION  DIAGNOSTIC POLYSOMNOGRAPHY REPORT      Patient: KATIE MARIE  YOB: 1945  Study Date: 8/5/2021  MRN: [id number]  Referring Provider: MD Hiral, Kavon  Ordering Provider: MD Jonh, Gabriela    Indications for Polysomnography: The patient is a 75-year-old Female who is 5' 4\" and weighs 124.0 lbs. Her BMI is 21.4, D Hanis sleepiness scale 1 and neck circumference is - cm. Relevant medical history includes SABRA and atrial fibrillation. She has been using dental appliance for the treatment of sleep apnea. An all-night diagnostic polysomnography (without the dental appliance) is obtained to re-evaluate for the overall severity of the sleep related breathing disorder.    Polysomnogram Data: A full night polysomnogram recorded the standard physiologic parameters including EEG, EOG, EMG, ECG, nasal and oral airflow. Respiratory parameters of chest and abdominal movements were recorded with respiratory inductance plethysmography. Oxygen saturation was recorded by pulse oximetry. Hypopnea scoring rule used: 1B 4%.    Sleep Architecture: Sleep fragmentation was noted with arousals mostly due to respiratory events, some spontaneous and few PLM related.  All sleep stages were observed.    The total recording time of the polysomnogram was 458.8 minutes. The total sleep time was 400.5 minutes. Sleep latency was decreased at 2.5 minutes without the use of a sleep aid. REM latency was 47.5 minutes. Arousal index was increased at 38.2 arousals per hour. Sleep efficiency was normal at 87.3%. Wake after sleep onset was 55.5 minutes. The patient spent 8.6% of total sleep time in Stage N1, 59.6% in Stage N2, 8.6% in Stage N3, and 23.2% in REM. Time in REM supine was 7.5 minutes.    Respiration: Moderate sleep apnea with both obstructive and central apneas was present with periodicity in breathing pattern and long cycle length, but events were predominantly obstructive in nature.  There " was no evidence of sleep associated hypoxemia.  Obstructive events were pronounced during REM sleep and during supine sleep position.      Events ? The polysomnogram revealed a presence of 29 obstructive, 6 central, and 35 mixed apneas resulting in an apnea index of 10.5 events per hour. There were 13 obstructive hypopneas and 38 central hypopneas resulting in an obstructive hypopnea index of 1.9 and central hypopnea index of 5.7 events per hour. The combined apnea/hypopnea index was 18.1 events per hour (central apnea/hypopnea index was 6.6 events per hour). The REM AHI was 38.7 events per hour. The supine AHI was 30.9 events per hour. The RERA index was 8.5 events per hour.  The RDI was 26.7 events per hour.    Snoring - was reported as moderate to loud.    Respiratory rate and pattern - was notable for periodicity in breathing pattern and long cycle length, but events were predominantly obstructive in nature.       Sustained Sleep Associated Hypoventilation - Transcutaneous carbon dioxide monitoring was not used, however significant hypoventilation was not suggested by oximetry.    Sleep Associated Hypoxemia - (Greater than 5 minutes O2 sat at or below 88%) was not present. Baseline oxygen saturation was 94.9%. Lowest oxygen saturation was 81.9%. Time spent less than or equal to 88% was 3.4 minutes. Time spent less than or equal to 89% was 4.8 minutes.    Movement Activity: Frequent periodic limb movements(PLMs) were observed, with few PLMs associated with arousals.  There were no abnormal sleep-related behaviors during the study.    Periodic Limb Activity - There were 375 PLMs during the entire study. The PLM index was 56.2 movements per hour. The PLM Arousal Index was 5.8 per hour.    REM EMG Activity - Excessive transient/sustained muscle activity was not present.    Nocturnal Behavior - Abnormal sleep related behaviors were not noted during/arising out of NREM / REM sleep.     Bruxism - None  apparent.    Cardiac Summary: Atrial fibrillation was noted.  The average pulse rate was 67.8 bpm. The minimum pulse rate was 46.0 bpm while the maximum pulse rate was 115.1 bpm. Atrial fibrillation was noted.    Assessment:     Moderate sleep apnea with both obstructive and central apneas was present with periodicity in breathing pattern and long cycle length, but events were predominantly obstructive in nature.  There was no evidence of sleep associated hypoxemia.  Obstructive events were pronounced during REM sleep and during supine sleep position.      Sleep fragmentation was noted with arousals mostly due to respiratory events, some spontaneous and few PLM related.  All sleep stages were observed.      Frequent periodic limb movements (PLMs) were observed, with few PLMs associated with arousals.      There were no abnormal sleep-related behaviors during the study.      Atrial fibrillation was noted.    Recommendations:    Recommend repeat polysomnography with full night titration of positive airway pressure therapy for the control of sleep related breathing disorder.    Alternative treatment option for the SRBD includes CPAP therapy at low pressure settings with close monitoring for central apneas. Recommend clinical follow up with sleep management team.    Suggest continuing follow up with Cardiology.    Advice regarding the risks of drowsy driving.    Suggest optimizing sleep schedule and avoiding sleep deprivation.    Weight management (if BMI > 30).    Pharmacologic therapy should be used for management of restless legs syndrome only if present and clinically indicated and not based on the presence of periodic limb movements alone.    Diagnostic Codes:   Obstructive Sleep Apnea G47.33  Primary Central Sleep Apnea G47.31  Periodic Limb Movement Disorder G47.61  Repetitive Intrusions Into Sleep F51.8    8/5/2021 [facility] (124.0 lbs) - AHI 18.1, RDI 26.7, Supine AHI 30.9, REM AHI 38.7,  Low O2 81.9%, Time Spent ?88% 3.4 minutes / Time Spent ?89% 4.8 minutes.     _____________________________________   Electronically Signed By: (Gabriela Gaines MD), 8/14/21

## 2021-08-16 LAB — SLPCOMP: NORMAL

## 2021-08-19 ENCOUNTER — TELEPHONE (OUTPATIENT)
Dept: CARDIOLOGY | Facility: CLINIC | Age: 76
End: 2021-08-19

## 2021-08-19 NOTE — TELEPHONE ENCOUNTER
8/19/21 Msg recd from Dr Blackman  Please inform pt that rate is somewhat control with avg rate of 100 bpm during chronic AF. I believe she is taking Toprol 25 mg daily. I would increase to bid for now.     Spoke w pt and explained recommendations. Pt states she has not been taking Metoprolol that was prescribed by PCP and was awaiting Dr Arriaza recommendations from [facility]. Spoke w Dr Blackman and he recommended pt start and take the Toprol 25 mg every day. Pt had many questions that I tried to answer tot the best of my knowledge. She requested paper copy of minitor to be mailed to her address .  Yves 440 pm

## 2021-09-02 ENCOUNTER — TELEPHONE (OUTPATIENT)
Dept: CARDIOLOGY | Facility: CLINIC | Age: 76
End: 2021-09-02

## 2021-09-02 NOTE — TELEPHONE ENCOUNTER
9/2/21 Pt called wondering if she should refill her rx for Metoprolol for 30 days or 90 days . Pt states she has been monitoring HR each day and usually gets readings of 60-80 but sometimes will get 90, 110 or even 120. She usually checks HR first thing in the morning. She has not written these down and does not know exactly how frequently the high heart rates occur.  Pt will get refill of Metoprolol for 30 days for now at current dose. She will monitor HR w oximeter x 1 more week and record and call back w data to determine if Metoprolol dose needs adjustment.  Pt voiced understanding and agreement with plan.   Yves 911 am

## 2021-09-13 ENCOUNTER — VIRTUAL VISIT (OUTPATIENT)
Dept: SLEEP MEDICINE | Facility: CLINIC | Age: 76
End: 2021-09-13
Payer: COMMERCIAL

## 2021-09-13 VITALS — HEIGHT: 64 IN | WEIGHT: 121 LBS | BODY MASS INDEX: 20.66 KG/M2

## 2021-09-13 DIAGNOSIS — I48.91 ATRIAL FIBRILLATION WITH NORMAL VENTRICULAR RATE (H): ICD-10-CM

## 2021-09-13 DIAGNOSIS — G47.33 OSA (OBSTRUCTIVE SLEEP APNEA): Primary | ICD-10-CM

## 2021-09-13 PROCEDURE — 99215 OFFICE O/P EST HI 40 MIN: CPT | Mod: 95 | Performed by: INTERNAL MEDICINE

## 2021-09-13 ASSESSMENT — MIFFLIN-ST. JEOR: SCORE: 1033.85

## 2021-09-13 NOTE — PATIENT INSTRUCTIONS
Your BMI is Body mass index is 20.77 kg/m .  Weight management is a personal decision.  If you are interested in exploring weight loss strategies, the following discussion covers the approaches that may be successful. Body mass index (BMI) is one way to tell whether you are at a healthy weight, overweight, or obese. It measures your weight in relation to your height.  A BMI of 18.5 to 24.9 is in the healthy range. A person with a BMI of 25 to 29.9 is considered overweight, and someone with a BMI of 30 or greater is considered obese. More than two-thirds of [place] adults are considered overweight or obese.  Being overweight or obese increases the risk for further weight gain. Excess weight may lead to heart disease and diabetes.  Creating and following plans for healthy eating and physical activity may help you improve your health.  Weight control is part of healthy lifestyle and includes exercise, emotional health, and healthy eating habits. Careful eating habits lifelong are the mainstay of weight control. Though there are significant health benefits from weight loss, long-term weight loss with diet alone may be very difficult to achieve- studies show long-term success with dietary management in less than 10% of people. Attaining a healthy weight may be especially difficult to achieve in those with severe obesity. In some cases, medications, devices and surgical management might be considered.  What can you do?  If you are overweight or obese and are interested in methods for weight loss, you should discuss this with your provider.     Consider reducing daily calorie intake by 500 calories.     Keep a food journal.     Avoiding skipping meals, consider cutting portions instead.    Diet combined with exercise helps maintain muscle while optimizing fat loss. Strength training is particularly important for building and maintaining muscle mass. Exercise helps reduce stress, increase energy, and improves fitness.  Increasing exercise without diet control, however, may not burn enough calories to loose weight.       Start walking three days a week 10-20 minutes at a time    Work towards walking thirty minutes five days a week     Eventually, increase the speed of your walking for 1-2 minutes at time    In addition, we recommend that you review healthy lifestyles and methods for weight loss available through the [employer] patient information sites:  http://win.niddk.nih.gov/publications/index.htm    And look into health and wellness programs that may be available through your health insurance provider, employer, local community center, or [employer].

## 2021-09-13 NOTE — PROGRESS NOTES
Shaina is a 75 year old who is being evaluated via a billable video visit.      How would you like to obtain your AVS? Mail a copy  If the video visit is dropped, the invitation should be resent by: Send to e-mail at: [email or web address]  Will anyone else be joining your video visit? No        Video-Visit Details    Type of service:  Video Visit    Video Start: 09/13/2021 01:05 pm  Video Stop: 09/13/2021 02:02 pm    Originating Location (pt. Location): Home    Distant Location (provider location):  [facility]     Platform used for Video Visit: Koby LEAVITT CMA, SLEEP MEDICINE, 9/13/2021 10:14 AM        [facility]     Sleep clinic follow up visit note    Date on this visit: 9/13/2021    Primary Physician: Ami Aguilar Ra     Chief complaint: Results of recently obtained sleep study    Shaina Calixto is a 75 year old with PMH of SABRA and atrial fibrillation. She has been using dental appliance for the treatment of sleep apnea. An all-night diagnostic polysomnography (without the dental appliance) was obtained on 8/5/2021, to re-evaluate for the overall severity of the sleep related breathing disorder.  Virtual visit is scheduled today to review the test results and to discuss plan of care.    Sleep study report:  Date of study: August 5, 2021   Sleep Architecture: Sleep fragmentation was noted with arousals mostly due to respiratory events, some spontaneous and few PLM related.  All sleep stages were observed.    The total recording time of the polysomnogram was 458.8 minutes. The total sleep time was 400.5 minutes. Sleep latency was decreased at 2.5 minutes without the use of a sleep aid. REM latency was 47.5 minutes. Arousal index was increased at 38.2 arousals per hour. Sleep efficiency was normal at 87.3%. Wake after sleep onset was 55.5 minutes. The patient spent 8.6% of total sleep time in Stage N1, 59.6% in Stage N2, 8.6% in Stage N3, and 23.2% in REM.  Time in REM supine was 7.5 minutes.     Respiration: Moderate sleep apnea with both obstructive and central apneas was present with periodicity in breathing pattern and long cycle length, but events were predominantly obstructive in nature.  There was no evidence of sleep associated hypoxemia.  Obstructive events were pronounced during REM sleep and during supine sleep position.      Events ? The polysomnogram revealed a presence of 29 obstructive, 6 central, and 35 mixed apneas resulting in an apnea index of 10.5 events per hour. There were 13 obstructive hypopneas and 38 central hypopneas resulting in an obstructive hypopnea index of 1.9 and central hypopnea index of 5.7 events per hour. The combined apnea/hypopnea index was 18.1 events per hour (central apnea/hypopnea index was 6.6 events per hour). The REM AHI was 38.7 events per hour. The supine AHI was 30.9 events per hour. The RERA index was 8.5 events per hour.  The RDI was 26.7 events per hour.    Snoring - was reported as moderate to loud.    Respiratory rate and pattern - was notable for periodicity in breathing pattern and long cycle length, but events were predominantly obstructive in nature.       Sustained Sleep Associated Hypoventilation - Transcutaneous carbon dioxide monitoring was not used, however significant hypoventilation was not suggested by oximetry.    Sleep Associated Hypoxemia - (Greater than 5 minutes O2 sat at or below 88%) was not present. Baseline oxygen saturation was 94.9%. Lowest oxygen saturation was 81.9%. Time spent less than or equal to 88% was 3.4 minutes. Time spent less than or equal to 89% was 4.8 minutes.     Movement Activity: Frequent periodic limb movements(PLMs) were observed, with few PLMs associated with arousals.  There were no abnormal sleep-related behaviors during the study.    Periodic Limb Activity - There were 375 PLMs during the entire study. The PLM index was 56.2 movements per hour. The PLM Arousal Index  was 5.8 per hour.    REM EMG Activity - Excessive transient/sustained muscle activity was not present.    Nocturnal Behavior - Abnormal sleep related behaviors were not noted during/arising out of NREM / REM sleep.     Bruxism - None apparent.     Cardiac Summary: Atrial fibrillation was noted.  The average pulse rate was 67.8 bpm. The minimum pulse rate was 46.0 bpm while the maximum pulse rate was 115.1 bpm. Atrial fibrillation was noted.     Assessment:     Moderate sleep apnea with both obstructive and central apneas was present with periodicity in breathing pattern and long cycle length, but events were predominantly obstructive in nature.  There was no evidence of sleep associated hypoxemia.  Obstructive events were pronounced during REM sleep and during supine sleep position.      Sleep fragmentation was noted with arousals mostly due to respiratory events, some spontaneous and few PLM related.  All sleep stages were observed.      Frequent periodic limb movements (PLMs) were observed, with few PLMs associated with arousals.      There were no abnormal sleep-related behaviors during the study.      Atrial fibrillation was noted.     Results were discussed with the patient in detail.    Allergies:    Allergies   Allergen Reactions     Floraquin [Iodoquinol] Anaphylaxis     LOST SENSE OF SMELL AND TASTE     Cephalosporins      Quinolones      Other reaction(s): Other - Describe In Comment Field  Lost sense of smell.  Pt would prefer to not take any of the medications in Fluoroquninolone group of antibiotics.     Cephalexin Rash     Cortisone Rash     Keflex [Cephalexin Monohydrate] Rash     Penicillins Rash     Triamcinolone Rash     Pt reports rash starting on face then spreading to arms, legs, and truck a few days after a knee injection       Medications:    Current Outpatient Medications   Medication Sig Dispense Refill     Alpha-Lipoic Acid 200 MG TABS Take by mouth daily        apixaban ANTICOAGULANT  (ELIQUIS) 5 MG tablet Take 5 mg by mouth       cholecalciferol (VITAMIN D3) 125 MCG (5000 UT) TABS tablet Take by mouth daily       Cyanocobalamin (VITAMIN B-12 IJ)        dhea 25 MG TABS Take 25 mg by mouth daily       Menaquinone-7 (VITAMIN K2 PO)        metoprolol succinate ER (TOPROL-XL) 25 MG 24 hr tablet Take 1 tablet (25 mg) by mouth daily 90 tablet 0     nystatin (MYCOSTATIN) 682222 units TABS tablet 3 times daily        Omega-3 Fatty Acids (OMEGA-3 CF PO) Take 1,280 mg by mouth daily       rivaroxaban ANTICOAGULANT (XARELTO ANTICOAGULANT) 20 MG TABS tablet Take 1 tablet (20 mg) by mouth daily (with dinner) 90 tablet 0     UNABLE TO FIND daily MEDICATION NAME: Butyrate 300 mg soft gel       UNABLE TO FIND MEDICATION NAME: Progesterone drops - compounded-  200mg-ML suspension - 6 drops at bedtime- apply topically       UNABLE TO FIND 2 times daily MEDICATION NAME: Estradiol, Estriol drops 30mg/ML suspension - 3 drops topically       UNABLE TO FIND MEDICATION NAME: magnesium l threonate, 1 scoop bid       UNABLE TO FIND MEDICATION NAME: reyene max liquid choline 120 mg & solicon 6 mg-6 drops         Problem List:  Patient Active Problem List    Diagnosis Date Noted     Restless legs syndrome (RLS) 09/02/2018     Priority: Medium     Plantar fasciitis 09/02/2018     Priority: Medium     HPV (human papilloma virus) infection 09/02/2018     Priority: Medium     Raynaud's disease without gangrene 09/02/2018     Priority: Medium     SABRA (obstructive sleep apnea) 09/02/2018     Priority: Medium     Age-related osteoporosis without current pathological fracture 09/02/2018     Priority: Medium     Eczema, unspecified type 09/02/2018     Priority: Medium     Dyspepsia 09/02/2018     Priority: Medium     Near syncope 09/02/2018     Priority: Medium     Multiple nevi 07/24/2018     Priority: Medium     Cherry angioma 07/24/2018     Priority: Medium     Screening for skin cancer 07/24/2018     Priority: Medium      CARDIOVASCULAR SCREENING; LDL GOAL LESS THAN 160 09/17/2015     Priority: Medium     High risk HPV infection 09/17/2015     Priority: Medium     Urticaria 09/22/2014     Priority: Medium     Problem list name updated by automated process. Provider to review       Yeast dermatitis 02/21/2014     Priority: Medium     Stress 02/21/2014     Priority: Medium     Mild major depression (H) 08/07/2013     Priority: Medium     Breast cancer screening 07/16/2012     Priority: Medium        Past Medical/Surgical History:  Past Medical History:   Diagnosis Date     Ankylosing spondylitis (H)      HLAB19 +     Complication of anesthesia      Depressive disorder [facility], young adult    resolved for many years     Eosinophilic cellulitis      Mild major depression (H)      Osteopenia     -2.1; Lumbar -2.5; Fem Neck      Sleep apnea     cpap     Small intestinal bacterial overgrowth 05/2017    has had for years but finally diagnosed correctly May 2017     Vitamin deficiency      Past Surgical History:   Procedure Laterality Date     ARTHROSCOPY KNEE RT/LT Right 2004     BIOPSY  Dec. 2016    spongiotic dermatitis     COLONOSCOPY  May 2015    normal     FOOT SURGERY Right 1995    due to foot injury     FOOT SURGERY Right 2000    ayers's neuroma     LASER YAG CAPSULOTOMY Bilateral 12/29/15     PHACOEMULSIFICATION CLEAR CORNEA WITH STANDARD INTRAOCULAR LENS IMPLANT Right 10/1/14     PHACOEMULSIFICATION CLEAR CORNEA WITH STANDARD INTRAOCULAR LENS IMPLANT Left 10/08/14     SOFT TISSUE SURGERY  1995    multiple crush & fx's to right foot       Social History:  Social History     Socioeconomic History     Marital status:      Spouse name: Not on file     Number of children: Not on file     Years of education: Not on file     Highest education level: Not on file   Occupational History     Not on file   Tobacco Use     Smoking status: Never Smoker     Smokeless tobacco: Never Used   Substance and Sexual Activity     Alcohol use: Yes      Comment: once every several months     Drug use: No     Sexual activity: Never   Other Topics Concern     Parent/sibling w/ CABG, MI or angioplasty before 65F 55M? No   Social History Narrative    How much exercise per week? Walking    How much calcium per day? Boron and dairy       How much caffeine per day? 1-2 cups coffee    How much vitamin D per day? supplement    Do you/your family wear seatbelts?  Yes    Do you/your family use safety helmets? Yes    Do you/your family use sunscreen? Yes    Do you/your family keep firearms in the home? No    Do you/your family have a smoke detector(s)? Yes        Do you feel safe in your home? Yes    Has anyone ever touched you in an unwanted manner? No     Explain          March 26, 2015 Maria G Cardenas LPN             Social Determinants of Health     Financial Resource Strain:      Difficulty of Paying Living Expenses:    Food Insecurity:      Worried About Running Out of Food in the Last Year:      Ran Out of Food in the Last Year:    Transportation Needs:      Lack of Transportation (Medical):      Lack of Transportation (Non-Medical):    Physical Activity:      Days of Exercise per Week:      Minutes of Exercise per Session:    Stress:      Feeling of Stress :    Social Connections:      Frequency of Communication with Friends and Family:      Frequency of Social Gatherings with Friends and Family:      Attends Hoahaoism Services:      Active Member of Clubs or Organizations:      Attends Club or Organization Meetings:      Marital Status:    Intimate Partner Violence:      Fear of Current or Ex-Partner:      Emotionally Abused:      Physically Abused:      Sexually Abused:        Family History:  Family History   Problem Relation Age of Onset     Endometrial Cancer Mother 73        only endometrial hyperplasia     Other - See Comments Mother         TIA     Parkinsonism Mother      Hypertension Mother      Thyroid Disease Mother      C.A.D. Maternal Grandfather       "Cancer Father         AML     Other Cancer Father      Other Cancer Maternal Half-Brother      Other Cancer Cousin      Other Cancer Paternal Half-Brother      Glaucoma No family hx of      Diabetes No family hx of      Macular Degeneration No family hx of          Physical Examination:  Vitals: Ht 1.626 m (5' 4\")   Wt 54.9 kg (121 lb)   LMP  (LMP Unknown)   BMI 20.77 kg/m    BMI= Body mass index is 20.77 kg/m .    [place] Total Score 9/13/2021   Total score - [place] 6     General: No apparent distress, appropriately groomed  Head: Normocephalic, atraumatic  Chest: No cough, no audible wheezing, able to talk in full sentences  Psych: coherent speech, normal rate and volume, able to articulate logical thoughts, able   to abstract reason, no tangential thoughts, no hallucinations   or delusions   Her affect is normal  Neuro:  Mental status: Alert and  Oriented X 3  Speech: normal     Impression/Plan:  Moderate sleep apnea with both obstructive and central apneas was present with periodicity in breathing pattern and long cycle length, but events were predominantly obstructive in nature. Obstructive events were pronounced during REM sleep and during supine sleep position.   We discussed the following options:   a) obtaining repeat polysomnography with full night titration of positive airway pressure therapy for the control of sleep related breathing disorder. (However, titration studies are currently on hold at our sleep lab)  or   b) empiric initiation of CPAP therapy with narrow range of  pressure settings with close monitoring for central apneas and clinical follow up with sleep management team.    Patient mentioned that she prefers to use the dental appliance since she finds that more comfortable compared to CPAP.  She also mentioned that she was interested in finding out if the sleep apnea is controlled with her  dental appliance.  For that she was recommended  sleep study with the dental appliance. She was " "instructed to first check with her insurance if the home sleep study /in lab sleep study with the dental appliance will be covered based on that I will generate the appropriate orders in the electronic medical record.  She also mentioned that she may consider CPAP treatment if the dental appliance is ineffective in controlling her sleep apnea ,  in the context of atrial fibrillation.    Periodic Limb movements: Frequent PLM's were observed during the sleep study: Since they were not associate with significant arousals and she does not report symptoms of RLS pharmacological intervention has not been recommended.    Atrial fibrillation: We discussed the association of SABRA with atrial fibrillation and other Cardiovascular disease.  She will continue continue follow-up with  Cardiology.    Encouraged following healthy diet, and exercise.    Patient was strongly advised to avoid driving, operating any heavy machinery or other hazardous situations while drowsy or sleepy.  Patient was counseled on the importance of driving while alert, to pull over if drowsy, or nap before getting into the vehicle if sleepy.      Plan is to communicate the results with the patient via video visit in 1 week to 10 days after the sleep study is completed.    CC: Ami Aguilar Ra, APRN CNP    The above note was dictated using voice recognition software. Although reviewed after completion, some word and grammatical error may remain . Please contact the author for any clarifications.    \"I spent a total of 60 minutes with Shaina Calixto during today's video visit, most of this time was spent counseling the patient and  coordinating care regarding sleep-related breathing disorder, positive airway pressure therapy therapy, dental appliance, HST/PSG with dental appliance, atrial fibrillation , going oversleep study , chart review  including documentation and further activities as noted above.\"      Radha Borja MD  M " [facility]  [id number] [place] [address] [id number] [phone]  Dept: [phone]

## 2021-09-14 NOTE — PROGRESS NOTES
AVS has been mailed to patients home address September 14, 2021      Autumn LEAVITT [facility]

## 2021-09-17 ENCOUNTER — TELEPHONE (OUTPATIENT)
Dept: SLEEP MEDICINE | Facility: CLINIC | Age: 76
End: 2021-09-17

## 2021-09-17 NOTE — TELEPHONE ENCOUNTER
Patient calling back she would like orders for a repeat sleep study discussed with Dr. Roldan. Patient is requesting a split night study and addition to study using patients dental appliance. Message will be sent to Dr. Roldan for review.     Raquel Valdivia MA

## 2021-09-17 NOTE — TELEPHONE ENCOUNTER
Reason for call:  Other   Patient called regarding (reason for call): call back  Additional comments: Patient has questions from her most recent visit with Dr Roldan about the plan of action. She is requesting a call back from a nurse if possible    Phone number to reach patient:  Home number on file [phone] (home)    Best Time:  any    Can we leave a detailed message on this number?  YES    Travel screening: Negative

## 2021-10-05 ENCOUNTER — TELEPHONE (OUTPATIENT)
Dept: SLEEP MEDICINE | Facility: CLINIC | Age: 76
End: 2021-10-05

## 2021-10-05 NOTE — TELEPHONE ENCOUNTER
Patient requesting an in lab sleep study to test dental device, patient requesting dental device titration. Message will be sent to Dr. Roldan for review.     Raquel Valdivia MA

## 2021-10-08 ENCOUNTER — TRANSFERRED RECORDS (OUTPATIENT)
Dept: HEALTH INFORMATION MANAGEMENT | Facility: CLINIC | Age: 76
End: 2021-10-08
Payer: COMMERCIAL

## 2021-10-11 DIAGNOSIS — G62.9 POLYNEUROPATHY: ICD-10-CM

## 2021-10-11 DIAGNOSIS — R19.5 OTHER FECAL ABNORMALITIES: ICD-10-CM

## 2021-10-11 DIAGNOSIS — K61.1 RECTAL ABSCESS: Primary | ICD-10-CM

## 2021-10-11 DIAGNOSIS — G47.33 OBSTRUCTIVE SLEEP APNEA (ADULT) (PEDIATRIC): ICD-10-CM

## 2021-10-11 DIAGNOSIS — N18.9 CHRONIC KIDNEY DISEASE, UNSPECIFIED: ICD-10-CM

## 2021-10-11 DIAGNOSIS — I73.00 RAYNAUD'S SYNDROME WITHOUT GANGRENE: ICD-10-CM

## 2021-10-11 DIAGNOSIS — R14.0 ABDOMINAL DISTENTION: ICD-10-CM

## 2021-10-11 DIAGNOSIS — G25.81 RESTLESS LEGS SYNDROME (RLS): ICD-10-CM

## 2021-10-11 DIAGNOSIS — R55 NEAR SYNCOPE: ICD-10-CM

## 2021-10-11 DIAGNOSIS — I73.00 RAYNAUD'S SYNDROME: ICD-10-CM

## 2021-10-11 DIAGNOSIS — R14.0 ABDOMINAL DISTENSION, GASEOUS: ICD-10-CM

## 2021-10-11 DIAGNOSIS — F32.0 MILD MAJOR DEPRESSION (H): ICD-10-CM

## 2021-10-14 DIAGNOSIS — G47.33 OSA (OBSTRUCTIVE SLEEP APNEA): Primary | ICD-10-CM

## 2021-10-15 PROBLEM — I48.19 ATRIAL FIBRILLATION, PERSISTENT (H): Status: ACTIVE | Noted: 2021-10-15

## 2021-10-23 ENCOUNTER — HEALTH MAINTENANCE LETTER (OUTPATIENT)
Age: 76
End: 2021-10-23

## 2021-10-28 ENCOUNTER — TRANSFERRED RECORDS (OUTPATIENT)
Dept: HEALTH INFORMATION MANAGEMENT | Facility: CLINIC | Age: 76
End: 2021-10-28
Payer: COMMERCIAL

## 2021-11-05 ENCOUNTER — TELEPHONE (OUTPATIENT)
Dept: FAMILY MEDICINE | Facility: CLINIC | Age: 76
End: 2021-11-05

## 2021-11-05 DIAGNOSIS — F41.9 ANXIETY DUE TO INVASIVE PROCEDURE: Primary | ICD-10-CM

## 2021-11-05 RX ORDER — DIAZEPAM 5 MG
TABLET ORAL
Qty: 1 TABLET | Refills: 0 | Status: SHIPPED | OUTPATIENT
Start: 2021-11-05 | End: 2022-03-09

## 2021-11-05 NOTE — TELEPHONE ENCOUNTER
Pt calls asking for one Valium tablet for her head, brain MRI on Monday. It is a recheck of her small aneurysm.   Her neurosurgeon asked her to call her Primary office.     She thought it was going to be 15 minute test,  but she called to confirm her appt today and they said it would be 45 minutes, so that is why she is asking for a prescription last minute.     Provider approval needed.     Her MRI is at [facility] in [place].

## 2021-11-19 ENCOUNTER — TRANSFERRED RECORDS (OUTPATIENT)
Dept: HEALTH INFORMATION MANAGEMENT | Facility: CLINIC | Age: 76
End: 2021-11-19
Payer: COMMERCIAL

## 2021-11-23 ENCOUNTER — TELEPHONE (OUTPATIENT)
Dept: CARDIOLOGY | Facility: CLINIC | Age: 76
End: 2021-11-23
Payer: COMMERCIAL

## 2021-11-23 NOTE — TELEPHONE ENCOUNTER
11/23/21 Pt called to report she has seen Dr Blackman in the past for management of Afib. She has been taking Metoprolol and Eliquis but thought she needed to see a general cardiologist as well so she established care at [facility] ( see [facility] notes). She underwent a DCCV on 10/28 but she is back in Afib again. She saw her Cardiologist at [facility] in follow up who recommended follow up w EP at [facility].  Pt would like to stay with Dr Blackman. Scheduled appt on 12/8 at [facility] to discuss options moving forward.  Yves 110 pm

## 2021-12-02 ENCOUNTER — THERAPY VISIT (OUTPATIENT)
Dept: SLEEP MEDICINE | Facility: CLINIC | Age: 76
End: 2021-12-02
Payer: COMMERCIAL

## 2021-12-02 ENCOUNTER — DOCUMENTATION ONLY (OUTPATIENT)
Dept: SLEEP MEDICINE | Facility: CLINIC | Age: 76
End: 2021-12-02

## 2021-12-02 DIAGNOSIS — G47.33 OSA (OBSTRUCTIVE SLEEP APNEA): ICD-10-CM

## 2021-12-02 PROCEDURE — 95811 POLYSOM 6/>YRS CPAP 4/> PARM: CPT | Mod: GC | Performed by: STUDENT IN AN ORGANIZED HEALTH CARE EDUCATION/TRAINING PROGRAM

## 2021-12-03 NOTE — PATIENT INSTRUCTIONS
Completed a all night titration PSG per provider order. (dental tx)     Preliminary AHI 18.  A final therapeutic dental titration was achieved.    Supine REM was seen on therapeutic pressure.    Patient reports feeling refreshed in AM.

## 2021-12-08 ENCOUNTER — OFFICE VISIT (OUTPATIENT)
Dept: CARDIOLOGY | Facility: CLINIC | Age: 76
End: 2021-12-08
Payer: COMMERCIAL

## 2021-12-08 VITALS
WEIGHT: 131 LBS | BODY MASS INDEX: 22.36 KG/M2 | HEIGHT: 64 IN | HEART RATE: 87 BPM | SYSTOLIC BLOOD PRESSURE: 113 MMHG | DIASTOLIC BLOOD PRESSURE: 73 MMHG

## 2021-12-08 DIAGNOSIS — I48.91 ATRIAL FIBRILLATION, UNSPECIFIED TYPE (H): Primary | ICD-10-CM

## 2021-12-08 PROCEDURE — 93000 ELECTROCARDIOGRAM COMPLETE: CPT | Performed by: INTERNAL MEDICINE

## 2021-12-08 PROCEDURE — 99215 OFFICE O/P EST HI 40 MIN: CPT | Performed by: INTERNAL MEDICINE

## 2021-12-08 ASSESSMENT — MIFFLIN-ST. JEOR: SCORE: 1074.46

## 2021-12-08 NOTE — LETTER
12/8/2021       RE: Shaina Calixto  [address] [place] [id number]     Dear Colleague,    Thank you for referring your patient, Shaina Calixto, to the [facility] at [facility]. Please see a copy of my visit note below.    HPI and Plan:   See dictation    Today's clinic visit entailed:  The following tests were independently interpreted by me as noted in my documentation: ecg,echo  20 minutes spent on the date of the encounter doing chart review   Provider  Link to [facility] Help Grid     The level of medical decision making during this visit was of moderate complexity.      Orders Placed This Encounter   Procedures     EKG 12-lead complete w/read - Clinics (performed today)       Orders Placed This Encounter   Medications     UNABLE TO FIND     Sig: daily crateagus oxyacantha     UNABLE TO FIND     Sig: Chinese Herbal Blend     UNABLE TO FIND     Sig: Ayurvedic Herbs ,oil     UNABLE TO FIND     Sig: daily Sacchoromyciu       There are no discontinued medications.      Encounter Diagnosis   Name Primary?     Atrial fibrillation, unspecified type (H) Yes       CURRENT MEDICATIONS:  Current Outpatient Medications   Medication Sig Dispense Refill     Alpha-Lipoic Acid 200 MG TABS Take by mouth daily        apixaban ANTICOAGULANT (ELIQUIS) 5 MG tablet Take 5 mg by mouth       cholecalciferol (VITAMIN D3) 125 MCG (5000 UT) TABS tablet Take by mouth daily       Cyanocobalamin (VITAMIN B-12 IJ)        dhea 25 MG TABS Take 25 mg by mouth daily       diazepam (VALIUM) 5 MG tablet Take 1 tablet 30 minutes prior to procedure. 1 tablet 0     Menaquinone-7 (VITAMIN K2 PO)        metoprolol succinate ER (TOPROL-XL) 25 MG 24 hr tablet Take 1 tablet (25 mg) by mouth daily 90 tablet 0     nystatin (MYCOSTATIN) 382577 units TABS tablet 3 times daily        Omega-3 Fatty Acids (OMEGA-3 CF PO) Take 1,280 mg by mouth daily       rivaroxaban ANTICOAGULANT  (XARELTO ANTICOAGULANT) 20 MG TABS tablet Take 1 tablet (20 mg) by mouth daily (with dinner) 90 tablet 0     UNABLE TO FIND daily crateagus oxyacantha       UNABLE TO FIND Chinese Herbal Blend       UNABLE TO FIND Ayurvedic Herbs ,oil       UNABLE TO FIND daily Sacchoromyciu       UNABLE TO FIND daily MEDICATION NAME: Butyrate 300 mg soft gel       UNABLE TO FIND MEDICATION NAME: Progesterone drops - compounded-  200mg-ML suspension - 6 drops at bedtime- apply topically       UNABLE TO FIND 2 times daily MEDICATION NAME: Estradiol, Estriol drops 30mg/ML suspension - 3 drops topically       UNABLE TO FIND MEDICATION NAME: magnesium l threonate, 1 scoop bid       UNABLE TO FIND MEDICATION NAME: reyene max liquid choline 120 mg & solicon 6 mg-6 drops         ALLERGIES     Allergies   Allergen Reactions     Floraquin [Iodoquinol] Anaphylaxis     LOST SENSE OF SMELL AND TASTE     Cephalosporins      Quinolones      Other reaction(s): Other - Describe In Comment Field  Lost sense of smell.  Pt would prefer to not take any of the medications in Fluoroquninolone group of antibiotics.     Cephalexin Rash     Cortisone Rash     Keflex [Cephalexin Monohydrate] Rash     Penicillins Rash     Triamcinolone Rash     Pt reports rash starting on face then spreading to arms, legs, and truck a few days after a knee injection       PAST MEDICAL HISTORY:  Past Medical History:   Diagnosis Date     Ankylosing spondylitis (H)      HLAB19 +     Atrial fibrillation, persistent (H) 10/15/2021     Complication of anesthesia      Depressive disorder college, young adult    resolved for many years     Eosinophilic cellulitis      Mild major depression (H)      Osteopenia     -2.1; Lumbar -2.5; Fem Neck      Sleep apnea     cpap     Small intestinal bacterial overgrowth 05/2017    has had for years but finally diagnosed correctly May 2017     Vitamin deficiency        PAST SURGICAL HISTORY:  Past Surgical History:   Procedure Laterality Date      ARTHROSCOPY KNEE RT/LT Right 2004     BIOPSY  Dec. 2016    spongiotic dermatitis     COLONOSCOPY  May 2015    normal     FOOT SURGERY Right 1995    due to foot injury     FOOT SURGERY Right 2000    ayers's neuroma     LASER YAG CAPSULOTOMY Bilateral 12/29/15     PHACOEMULSIFICATION CLEAR CORNEA WITH STANDARD INTRAOCULAR LENS IMPLANT Right 10/1/14     PHACOEMULSIFICATION CLEAR CORNEA WITH STANDARD INTRAOCULAR LENS IMPLANT Left 10/08/14     SOFT TISSUE SURGERY  1995    multiple crush & fx's to right foot       FAMILY HISTORY:  Family History   Problem Relation Age of Onset     Endometrial Cancer Mother 73        only endometrial hyperplasia     Other - See Comments Mother         TIA     Parkinsonism Mother      Hypertension Mother      Thyroid Disease Mother      C.A.D. Maternal Grandfather      Cancer Father         AML     Other Cancer Father      Other Cancer Maternal Half-Brother      Other Cancer Cousin      Other Cancer Paternal Half-Brother      Glaucoma No family hx of      Diabetes No family hx of      Macular Degeneration No family hx of        SOCIAL HISTORY:  Social History     Socioeconomic History     Marital status:      Spouse name: None     Number of children: None     Years of education: None     Highest education level: None   Occupational History     None   Tobacco Use     Smoking status: Never Smoker     Smokeless tobacco: Never Used   Substance and Sexual Activity     Alcohol use: Yes     Comment: once every several months     Drug use: No     Sexual activity: Never   Other Topics Concern     Parent/sibling w/ CABG, MI or angioplasty before 65F 55M? No   Social History Narrative    How much exercise per week? Walking    How much calcium per day? Boron and dairy       How much caffeine per day? 1-2 cups coffee    How much vitamin D per day? supplement    Do you/your family wear seatbelts?  Yes    Do you/your family use safety helmets? Yes    Do you/your family use sunscreen? Yes    Do  "you/your family keep firearms in the home? No    Do you/your family have a smoke detector(s)? Yes        Do you feel safe in your home? Yes    Has anyone ever touched you in an unwanted manner? No     Explain          March 26, 2015 Maria G Cardenas LPN             Social Determinants of Health     Financial Resource Strain: Not on file   Food Insecurity: Not on file   Transportation Needs: Not on file   Physical Activity: Not on file   Stress: Not on file   Social Connections: Not on file   Intimate Partner Violence: Not on file   Housing Stability: Not on file       Review of Systems:  Skin:  Negative       Eyes:  Positive for glasses    ENT:  Positive for hearing loss    Respiratory:  Positive for sleep apnea No cpap   Cardiovascular:  Negative for;chest pain Positive for;heaviness    Gastroenterology: Negative      Genitourinary:  not assessed      Musculoskeletal:  Negative      Neurologic:  Positive for numbness or tingling of feet    Psychiatric:  Positive for anxiety    Heme/Lymph/Imm:  Negative      Endocrine:  Negative        Physical Exam:  Vitals: /73   Pulse 87   Ht 1.626 m (5' 4.02\")   Wt 59.4 kg (131 lb)   LMP  (LMP Unknown)   BMI 22.47 kg/m      Constitutional:  cooperative, alert and oriented, well developed, well nourished, in no acute distress thin;appears younger than stated age      Skin:  warm and dry to the touch, no apparent skin lesions or masses noted          Head:  normocephalic, no masses or lesions        Eyes:           Lymph:No Cervical lymphadenopathy present     ENT:  no pallor or cyanosis        Neck:  carotid pulses are full and equal bilaterally, JVP normal, no carotid bruit        Respiratory:  normal breath sounds, clear to auscultation, normal A-P diameter, normal symmetry, normal respiratory excursion, no use of accessory muscles         Cardiac:   irregularly irregular rhythm   no presence of murmur          pulses full and equal, no bruits auscultated                 "                        GI:  abdomen soft, non-tender, BS normoactive, no mass, no HSM, no bruits        Extremities and Muscular Skeletal:  no deformities, clubbing, cyanosis, erythema observed              Neurological:  no gross motor deficits        Psych:  Alert and Oriented x 3        Service Date: 12/08/2021    HISTORY OF PRESENT ILLNESS:  Thank you for allowing me to participate in the care of this delightful patient.  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  I had the pleasure of meeting the patient for first time this past May when she was noted to be in atrial fibrillation during a routine EKG as part of a preoperative evaluation.  The patient was recommended to be on metoprolol and Xarelto but decided to hold off until she saw me for further evaluation.    At that visit, I had extensive discussion with her about the potential cause of atrial fibrillation, in this case, probably related to her age.  We discussed the option of rate control versus rhythm control strategy.  At that time, Shaina denied having any symptoms such as palpitations, shortness of breath, or change in exercise tolerance.  In light of that, I recommended an echocardiogram to ensure she has normal LV function, which was the case, but she does have moderate MR along with moderate TR as well.  There was severe biatrial enlargement, suggesting that perhaps she has been in AFib for quite some time.    I had her wear a monitor when she was not taking a beta blocker.  The monitor shows that her average rate was 101 beats per minute with the lowest of 45 and a max of 177.  In light of that, I had her take a beta blocker.    Since then, she apparently was recommended to proceed with a cardioversion, for which she was able to stay in sinus rhythm for about 1 week.  She did check her pulse twice a day during that time and noted the heart rate stayed steady, and more importantly,  she felt more energetic, which she was a surprise to her.  Since then, however, she has reverted back to AFib with the loss of all the energy that she had and also was confirmed by her heart rate monitoring showing that the rate varies from 112 to 60-70 in a matter of seconds.  In light of that, she decided to see me for further discussion of how to manage her atrial fibrillation.    We sat down for the next hour or so to discuss all her questions, for which she had many, filling up more than 2 pages.  I patiently listened to her questions and concerns, and I answered all of it.  For now, I would agree that rhythm control will be pursued, as she felt much better once in sinus rhythm.  Right now, even though the rate is controlled, she is still having lack of energy.    We discussed the option of antiarrhythmic drug, such as amiodarone, which I would defer given the chronic duration of atrial fibrillation and class 1c, or sotalol, is unlikely to be effective.  The other option would be an ablation, either we can do as a first line versus later if the antiarrhythmic drug is ineffective.  The option of last resort would be AV bettina ablation and pacemaker implantation.    I did go over side effects of amiodarone including inflammation of thyroid, liver and lungs, which is about 1% a year or less, especially if we use a maintenance dose of 100 mg a day.  I will probably load her first at 200 mg twice a day for a month and proceed with a cardioversion in a couple weeks' time.  After a month of amiodarone, I will have her on 200 and eventually taper down to 100 mg a day.  In the meantime, I asked her to continue Eliquis given her CHADS-VASc score of at last 3.  Shaina would like to go home and think more about amiodarone or any of the other options and let me know.    Kavon Blackman MD        D: 2021   T: 2021   MT: tessa    Name:     SHAINA MARIE  MRN:      [id number]        Account:      [id number]   :       1945           Service Date: 12/08/2021       Document: [id number]

## 2021-12-08 NOTE — PROGRESS NOTES
HPI and Plan:   See dictation    Today's clinic visit entailed:  The following tests were independently interpreted by me as noted in my documentation: ecg,echo  20 minutes spent on the date of the encounter doing chart review   Provider  Link to MDM Help Grid     The level of medical decision making during this visit was of moderate complexity.      Orders Placed This Encounter   Procedures     EKG 12-lead complete w/read - Clinics (performed today)       Orders Placed This Encounter   Medications     UNABLE TO FIND     Sig: daily crateagus oxyacantha     UNABLE TO FIND     Sig: Chinese Herbal Blend     UNABLE TO FIND     Sig: Ayurvedic Herbs ,oil     UNABLE TO FIND     Sig: daily Sacchoromyciu       There are no discontinued medications.      Encounter Diagnosis   Name Primary?     Atrial fibrillation, unspecified type (H) Yes       CURRENT MEDICATIONS:  Current Outpatient Medications   Medication Sig Dispense Refill     Alpha-Lipoic Acid 200 MG TABS Take by mouth daily        apixaban ANTICOAGULANT (ELIQUIS) 5 MG tablet Take 5 mg by mouth       cholecalciferol (VITAMIN D3) 125 MCG (5000 UT) TABS tablet Take by mouth daily       Cyanocobalamin (VITAMIN B-12 IJ)        dhea 25 MG TABS Take 25 mg by mouth daily       diazepam (VALIUM) 5 MG tablet Take 1 tablet 30 minutes prior to procedure. 1 tablet 0     Menaquinone-7 (VITAMIN K2 PO)        metoprolol succinate ER (TOPROL-XL) 25 MG 24 hr tablet Take 1 tablet (25 mg) by mouth daily 90 tablet 0     nystatin (MYCOSTATIN) 190403 units TABS tablet 3 times daily        Omega-3 Fatty Acids (OMEGA-3 CF PO) Take 1,280 mg by mouth daily       rivaroxaban ANTICOAGULANT (XARELTO ANTICOAGULANT) 20 MG TABS tablet Take 1 tablet (20 mg) by mouth daily (with dinner) 90 tablet 0     UNABLE TO FIND daily crateagus oxyacantha       UNABLE TO FIND Chinese Herbal Blend       UNABLE TO FIND Ayurvedic Herbs ,oil       UNABLE TO FIND daily Sacchoromyciu       UNABLE TO FIND daily  MEDICATION NAME: Butyrate 300 mg soft gel       UNABLE TO FIND MEDICATION NAME: Progesterone drops - compounded-  200mg-ML suspension - 6 drops at bedtime- apply topically       UNABLE TO FIND 2 times daily MEDICATION NAME: Estradiol, Estriol drops 30mg/ML suspension - 3 drops topically       UNABLE TO FIND MEDICATION NAME: magnesium l threonate, 1 scoop bid       UNABLE TO FIND MEDICATION NAME: reyene max liquid choline 120 mg & solicon 6 mg-6 drops         ALLERGIES     Allergies   Allergen Reactions     Floraquin [Iodoquinol] Anaphylaxis     LOST SENSE OF SMELL AND TASTE     Cephalosporins      Quinolones      Other reaction(s): Other - Describe In Comment Field  Lost sense of smell.  Pt would prefer to not take any of the medications in Fluoroquninolone group of antibiotics.     Cephalexin Rash     Cortisone Rash     Keflex [Cephalexin Monohydrate] Rash     Penicillins Rash     Triamcinolone Rash     Pt reports rash starting on face then spreading to arms, legs, and truck a few days after a knee injection       PAST MEDICAL HISTORY:  Past Medical History:   Diagnosis Date     Ankylosing spondylitis (H)      HLAB19 +     Atrial fibrillation, persistent (H) 10/15/2021     Complication of anesthesia      Depressive disorder [facility], young adult    resolved for many years     Eosinophilic cellulitis      Mild major depression (H)      Osteopenia     -2.1; Lumbar -2.5; Fem Neck      Sleep apnea     cpap     Small intestinal bacterial overgrowth 05/2017    has had for years but finally diagnosed correctly May 2017     Vitamin deficiency        PAST SURGICAL HISTORY:  Past Surgical History:   Procedure Laterality Date     ARTHROSCOPY KNEE RT/LT Right 2004     BIOPSY  Dec. 2016    spongiotic dermatitis     COLONOSCOPY  May 2015    normal     FOOT SURGERY Right 1995    due to foot injury     FOOT SURGERY Right 2000    ayers's neuroma     LASER YAG CAPSULOTOMY Bilateral 12/29/15     PHACOEMULSIFICATION CLEAR CORNEA WITH  STANDARD INTRAOCULAR LENS IMPLANT Right 10/1/14     PHACOEMULSIFICATION CLEAR CORNEA WITH STANDARD INTRAOCULAR LENS IMPLANT Left 10/08/14     SOFT TISSUE SURGERY  1995    multiple crush & fx's to right foot       FAMILY HISTORY:  Family History   Problem Relation Age of Onset     Endometrial Cancer Mother 73        only endometrial hyperplasia     Other - See Comments Mother         TIA     Parkinsonism Mother      Hypertension Mother      Thyroid Disease Mother      C.A.D. Maternal Grandfather      Cancer Father         AML     Other Cancer Father      Other Cancer Maternal Half-Brother      Other Cancer Cousin      Other Cancer Paternal Half-Brother      Glaucoma No family hx of      Diabetes No family hx of      Macular Degeneration No family hx of        SOCIAL HISTORY:  Social History     Socioeconomic History     Marital status:      Spouse name: None     Number of children: None     Years of education: None     Highest education level: None   Occupational History     None   Tobacco Use     Smoking status: Never Smoker     Smokeless tobacco: Never Used   Substance and Sexual Activity     Alcohol use: Yes     Comment: once every several months     Drug use: No     Sexual activity: Never   Other Topics Concern     Parent/sibling w/ CABG, MI or angioplasty before 65F 55M? No   Social History Narrative    How much exercise per week? Walking    How much calcium per day? Boron and dairy       How much caffeine per day? 1-2 cups coffee    How much vitamin D per day? supplement    Do you/your family wear seatbelts?  Yes    Do you/your family use safety helmets? Yes    Do you/your family use sunscreen? Yes    Do you/your family keep firearms in the home? No    Do you/your family have a smoke detector(s)? Yes        Do you feel safe in your home? Yes    Has anyone ever touched you in an unwanted manner? No     Explain          March 26, 2015 Maria G Cardenas LPN             Social Determinants of Health  "    Financial Resource Strain: Not on file   Food Insecurity: Not on file   Transportation Needs: Not on file   Physical Activity: Not on file   Stress: Not on file   Social Connections: Not on file   Intimate Partner Violence: Not on file   Housing Stability: Not on file       Review of Systems:  Skin:  Negative       Eyes:  Positive for glasses    ENT:  Positive for hearing loss    Respiratory:  Positive for sleep apnea No cpap   Cardiovascular:  Negative for;chest pain Positive for;heaviness    Gastroenterology: Negative      Genitourinary:  not assessed      Musculoskeletal:  Negative      Neurologic:  Positive for numbness or tingling of feet    Psychiatric:  Positive for anxiety    Heme/Lymph/Imm:  Negative      Endocrine:  Negative        Physical Exam:  Vitals: /73   Pulse 87   Ht 1.626 m (5' 4.02\")   Wt 59.4 kg (131 lb)   LMP  (LMP Unknown)   BMI 22.47 kg/m      Constitutional:  cooperative, alert and oriented, well developed, well nourished, in no acute distress thin;appears younger than stated age      Skin:  warm and dry to the touch, no apparent skin lesions or masses noted          Head:  normocephalic, no masses or lesions        Eyes:           Lymph:No Cervical lymphadenopathy present     ENT:  no pallor or cyanosis        Neck:  carotid pulses are full and equal bilaterally, JVP normal, no carotid bruit        Respiratory:  normal breath sounds, clear to auscultation, normal A-P diameter, normal symmetry, normal respiratory excursion, no use of accessory muscles         Cardiac:   irregularly irregular rhythm   no presence of murmur          pulses full and equal, no bruits auscultated                                        GI:  abdomen soft, non-tender, BS normoactive, no mass, no HSM, no bruits        Extremities and Muscular Skeletal:  no deformities, clubbing, cyanosis, erythema observed              Neurological:  no gross motor deficits        Psych:  Alert and Oriented x 3  "       CC  No referring provider defined for this encounter.

## 2021-12-09 LAB — SLPCOMP: NORMAL

## 2021-12-09 NOTE — PROGRESS NOTES
Service Date: 12/08/2021    HISTORY OF PRESENT ILLNESS:  Thank you for allowing me to participate in the care of this delightful patient.  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  I had the pleasure of meeting the patient for first time this past May when she was noted to be in atrial fibrillation during a routine EKG as part of a preoperative evaluation.  The patient was recommended to be on metoprolol and Xarelto but decided to hold off until she saw me for further evaluation.    At that visit, I had extensive discussion with her about the potential cause of atrial fibrillation, in this case, probably related to her age.  We discussed the option of rate control versus rhythm control strategy.  At that time, Shaina denied having any symptoms such as palpitations, shortness of breath, or change in exercise tolerance.  In light of that, I recommended an echocardiogram to ensure she has normal LV function, which was the case, but she does have moderate MR along with moderate TR as well.  There was severe biatrial enlargement, suggesting that perhaps she has been in AFib for quite some time.    I had her wear a monitor when she was not taking a beta blocker.  The monitor shows that her average rate was 101 beats per minute with the lowest of 45 and a max of 177.  In light of that, I had her take a beta blocker.    Since then, she apparently was recommended to proceed with a cardioversion, for which she was able to stay in sinus rhythm for about 1 week.  She did check her pulse twice a day during that time and noted the heart rate stayed steady, and more importantly, she felt more energetic, which she was a surprise to her.  Since then, however, she has reverted back to AFib with the loss of all the energy that she had and also was confirmed by her heart rate monitoring showing that the rate varies from 112 to 60-70 in a matter of seconds.  In light of  that, she decided to see me for further discussion of how to manage her atrial fibrillation.    We sat down for the next hour or so to discuss all her questions, for which she had many, filling up more than 2 pages.  I patiently listened to her questions and concerns, and I answered all of it.  For now, I would agree that rhythm control will be pursued, as she felt much better once in sinus rhythm.  Right now, even though the rate is controlled, she is still having lack of energy.    We discussed the option of antiarrhythmic drug, such as amiodarone, which I would defer given the chronic duration of atrial fibrillation and class 1c, or sotalol, is unlikely to be effective.  The other option would be an ablation, either we can do as a first line versus later if the antiarrhythmic drug is ineffective.  The option of last resort would be AV bettina ablation and pacemaker implantation.    I did go over side effects of amiodarone including inflammation of thyroid, liver and lungs, which is about 1% a year or less, especially if we use a maintenance dose of 100 mg a day.  I will probably load her first at 200 mg twice a day for a month and proceed with a cardioversion in a couple weeks' time.  After a month of amiodarone, I will have her on 200 and eventually taper down to 100 mg a day.  In the meantime, I asked her to continue Eliquis given her CHADS-VASc score of at last 3.  Shaina would like to go home and think more about amiodarone or any of the other options and let me know.    Kavon Blackman MD        D: 2021   T: 2021   MT: tessa    Name:     SHAINA MARIE  MRN:      [id number]        Account:      [id number]   :      1945           Service Date: 2021       Document: [id number]

## 2021-12-09 NOTE — PROCEDURES
" SLEEP STUDY INTERPRETATION  MAD TITRATION STUDY      Patient: KATIE MARIE  YOB: 1945  Study Date: 12/2/2021  MRN: [id number]  Referring Provider: -  Ordering Provider: MD Jonh, Gabriela    Indications for Polysomnography: The patient is a 75-year-old Female who is 5' 4\" and weighs 121.0 lbs. Her BMI is 20.9, [place] sleepiness scale 6 and neck circumference is - cm. Relevant medical history includes moderate SABRA [AHI 18.1], atrial fibrillation, restless legs syndrome, and depression. A polysomnogram with titration of dental appliance was performed to correct for sleep apnea.    Polysomnogram Data: A full night polysomnogram recorded the standard physiologic parameters including EEG, EOG, EMG, ECG, nasal and oral airflow. Respiratory parameters of chest and abdominal movements were recorded with respiratory inductance plethysmography. Oxygen saturation was recorded by pulse oximetry. Hypopnea scoring rule used: 1B 4%.    Treatment PSG  Sleep Architecture: Normal sleep efficiency.  All stages of sleep were observed   The total recording time of the polysomnogram was 431.3 minutes. The total sleep time was 373.0 minutes. Sleep latency was decreased at 3.1 minutes without the use of a sleep aid. REM latency was 66.5 minutes. Arousal index was normal at 12.2 arousals per hour. Sleep efficiency was normal at 86.5%. Wake after sleep onset was 54.0 minutes. The patient spent 3.6% of total sleep time in Stage N1, 47.5% in Stage N2, 22.5% in Stage N3, and 26.4% in REM. Time in REM supine was 40.0 minutes.    Respiration: Dental appliance was adjusted twice during the night. At the final setting of dental appliance there was significant improvement with disordered breathing events and snoring. Due to predominance of obstructive events in supine position, combination using dental appliance with positional therapy during sleep could be considered. REM supine was observed with the dental appliance " during the initial adjustment, but only REM sleep in lateral position was noted at the final setting.    Snoring -improved on treatment.    Respiratory rate and pattern - was notable for normal respiratory rate and pattern.    Sustained Sleep Associated Hypoventilation - Transcutaneous carbon dioxide monitoring was not used.    Sleep Associated Hypoxemia - (Greater than 5 minutes O2 sat at or below 88%) was not present. Baseline oxygen saturation was 95.5%. Lowest oxygen saturation was 88.6%. Time spent less than or equal to 88% was 0 minutes. Time spent less than or equal to 89% was 0.1 minutes.    Movement Activity: Frequent periodic limb movements were seen, but they were not associated with significant arousals.     Periodic Limb Activity - There were 376 PLMs during the entire study. The PLM index was 60.5 movements per hour. The PLM Arousal Index was 6.1 per hour.    REM EMG Activity - Excessive muscle activity was not present.    Nocturnal Behavior - Abnormal sleep related behaviors were not noted during/arising out of NREM / REM sleep.      Bruxism - None apparent.    Cardiac Summary: Atrial fibrillation was observed.    The average pulse rate was 64.5 bpm. The minimum pulse rate was 42.9 bpm while the maximum pulse rate was 131.7 bpm. Arrhythmias were noted: a fib     Assessment:      Dental appliance was adjusted twice during the night. At the final setting of dental appliance there was significant improvement with disordered breathing events and snoring. Due to predominance of obstructive events in supine position, combination using dental appliance with positional therapy during sleep could be considered. REM supine was observed with the dental appliance during the initial adjustment, but only REM sleep in lateral position was noted at the final setting.    Sleep architecture was remarkable for normal sleep efficiency all stages of sleep were observed.     Frequent periodic limb movements were seen, but  they were not associated with significant arousals.     Atrial fibrillation was observed.     Recommendations:    Suggest treatment of SABRA with dental appliance at the final setting that was achieved during sleep study, if patient tolerates, along with positional therapy during sleep.    Continue follow up with cardiology for management of atrial fibrillation.       Advice regarding the risks of drowsy driving.    Suggest optimizing sleep schedule and avoiding sleep deprivation.    Pharmacologic therapy should be used for management of restless legs syndrome only if present and clinically indicated and not based on the presence of periodic limb movements alone.    Diagnostic Codes:   Obstructive Sleep Apnea G47.33  Periodic Limb Movement Disorder G47.61    12/2/2021 [place]erna Titration Sleep Study (121.0 lbs) - Dental appliance was adjusted twice during the night. At the final setting of dental appliance there was significant improvement with disordered breathing events and snoring with an AHI of 1.0. Time Spent in REM supine at this pressure was -.    Fellow MD Glenis Gamez   Attending MD: PSG was personally reviewed in detail with the Sleep Medicine Fellow.  The above interpretation reflects our joint assessment and recommendations.   _____________________________________   Electronically Signed By: (Gabriela Borja MD), 12/8/21

## 2021-12-20 ENCOUNTER — VIRTUAL VISIT (OUTPATIENT)
Dept: SLEEP MEDICINE | Facility: CLINIC | Age: 76
End: 2021-12-20
Payer: COMMERCIAL

## 2021-12-20 DIAGNOSIS — G47.33 OSA (OBSTRUCTIVE SLEEP APNEA): Primary | ICD-10-CM

## 2021-12-20 PROCEDURE — 99215 OFFICE O/P EST HI 40 MIN: CPT | Mod: 95 | Performed by: INTERNAL MEDICINE

## 2021-12-20 ASSESSMENT — SLEEP AND FATIGUE QUESTIONNAIRES
HOW LIKELY ARE YOU TO NOD OFF OR FALL ASLEEP WHILE SITTING AND READING: SLIGHT CHANCE OF DOZING
HOW LIKELY ARE YOU TO NOD OFF OR FALL ASLEEP IN A CAR, WHILE STOPPED FOR A FEW MINUTES IN TRAFFIC: WOULD NEVER DOZE
HOW LIKELY ARE YOU TO NOD OFF OR FALL ASLEEP WHILE WATCHING TV: WOULD NEVER DOZE
HOW LIKELY ARE YOU TO NOD OFF OR FALL ASLEEP WHEN YOU ARE A PASSENGER IN A CAR FOR AN HOUR WITHOUT A BREAK: WOULD NEVER DOZE
HOW LIKELY ARE YOU TO NOD OFF OR FALL ASLEEP WHILE SITTING INACTIVE IN A PUBLIC PLACE: SLIGHT CHANCE OF DOZING
HOW LIKELY ARE YOU TO NOD OFF OR FALL ASLEEP WHILE LYING DOWN TO REST IN THE AFTERNOON WHEN CIRCUMSTANCES PERMIT: SLIGHT CHANCE OF DOZING
HOW LIKELY ARE YOU TO NOD OFF OR FALL ASLEEP WHILE SITTING AND TALKING TO SOMEONE: WOULD NEVER DOZE
HOW LIKELY ARE YOU TO NOD OFF OR FALL ASLEEP WHILE SITTING QUIETLY AFTER LUNCH WITHOUT ALCOHOL: WOULD NEVER DOZE

## 2021-12-20 NOTE — PATIENT INSTRUCTIONS
Your BMI is There is no height or weight on file to calculate BMI.    What is BMI?  Body mass index (BMI) is one way to tell whether you are at a healthy weight, overweight, or obese. It measures your weight in relation to your height.  A BMI of 18.5 to 24.9 is in the healthy range. A person with a BMI of 25 to 29.9 is considered overweight, and someone with a BMI of 30 or greater is considered obese.  Another way to find out if you are at risk for health problems caused by overweight and obesity is to measure your waist. If you are a woman and your waist is more than 35 inches, or if you are a man and your waist is more than 40 inches, your risk of disease may be higher.  More than two-thirds of American adults are considered overweight or obese. Being overweight or obese increases the risk for further weight gain.  Excess weight may lead to heart disease and diabetes. Creating and following plans for healthy eating and physical activity may help you improve your health.    Methods for maintaining or losing weight.  Weight control is part of healthy lifestyle and includes exercise, emotional health, and healthy eating habits.  Careful eating habits lifelong is the mainstay of weight control.  Though there are significant health benefits from weight loss, long-term weight loss with diet alone may be very difficult to achieve- studies show long-term success with dietary management in less than 10% of people. Attaining a healthy weight may be especially difficult to achieve in those with severe obesity. In some cases, medications, devices and surgical management might be considered.    What can you do?  If you are overweight or obese and are interested in methods for weight loss, you should discuss this with your provider. In addition, we recommend that you review healthy life styles and methods for weight loss available through the National Institutes of Health patient information sites:    http://win.niddk.nih.gov/publications/index.htm    Summary of today's visit:  Recommended continuing the treatment with the dental appliance at the current setting, which is the final setting that was achieved during the night of the recent sleep study, along with positional therapy during sleep.  Please continue follow-up with your dentist regularly.    We discussed the options for positional therapy during sleep including FDA approved zzoma pillow for which prescription has been provided if you are interested and other devices of similar type which are comparatively less expensive, do not require prescription and can be purchased through online resources such as slumber bump, sleep noodle, U shaped body pillow, tennis ball T-shirt for snoring/ sleep apnea.    Please call our clinic at [phone] after you obtain the positional device of your choice, in order to schedule a home sleep study which will be done while you are using both positional device of your choice along with dental appliance to check for the effectiveness of the combination treatment for sleep apnea.  Plan is to communicate with you the test results via [employer] in approximately 10 to 14 days after the test is completed.     Please continue follow-up with cardiology.

## 2021-12-20 NOTE — PROGRESS NOTES
"Shaina is a 76 year old who is being evaluated via a billable video visit.      How would you like to obtain your AVS? MyChart  If the video visit is dropped, the invitation should be resent by: Send to e-mail at: [email or web address]  Will anyone else be joining your video visit? No  {If patient encounters technical issues they should call [phone] :[id number]}    Video Start Time: {video visit start/end time for provider to select:152948}  Video-Visit Details    Type of service:  Video Visit    Video End Time:{video visit start/end time for provider to select:152948}    Originating Location (pt. Location): {video visit patient location:802345::\"Home\"}    Distant Location (provider location):  [facility]     Platform used for Video Visit: {Virtual Visit Platforms:435118::\"[employer]\"}  "

## 2021-12-20 NOTE — PROGRESS NOTES
Shaina is a 76 year old who is being evaluated via a billable video visit.      How would you like to obtain your AVS? MyChart  If the video visit is dropped, the invitation should be resent by: Send to e-mail at: [email or web address]  Will anyone else be joining your video visit? No        Video-Visit Details    Type of service:  Video Visit    Video Start Time:Start: 12/20/2021 09:07 am  Stop: 12/20/2021 09:25 am    Originating Location (pt. Location): Home    Distant Location (provider location):  [facility]     Platform used for Video Visit: [facility]     Sleep clinic follow up visit note    Date on this visit: 12/20/2021    Primary Physician: Ami Aguilar Ra     Chief complaint: Review results of recent sleep study    Shaina Calixto 76 year old with h/o moderate SABRA [AHI 18.1], atrial fibrillation, restless legs syndrome, and depression. A polysomnogram with titration of dental appliance was performed on 12/2/21,  to correct for sleep apnea. She presents for video visit today to review the results of the recent sleep study.     Polysomnogram report:  Date of study: December 2, 2021  Treatment PSG  Sleep Architecture: Normal sleep efficiency.  All stages of sleep were observed   The total recording time of the polysomnogram was 431.3 minutes. The total sleep time was 373.0 minutes. Sleep latency was decreased at 3.1 minutes without the use of a sleep aid. REM latency was 66.5 minutes. Arousal index was normal at 12.2 arousals per hour. Sleep efficiency was normal at 86.5%. Wake after sleep onset was 54.0 minutes. The patient spent 3.6% of total sleep time in Stage N1, 47.5% in Stage N2, 22.5% in Stage N3, and 26.4% in REM. Time in REM supine was 40.0 minutes.     Respiration: Dental appliance was adjusted twice during the night. At the final setting of dental appliance there was significant improvement with disordered breathing events and snoring.  Due to predominance of obstructive events in supine position, combination using dental appliance with positional therapy during sleep could be considered. REM supine was observed with the dental appliance during the initial adjustment, but only REM sleep in lateral position was noted at the final setting.    Snoring -improved on treatment.    Respiratory rate and pattern - was notable for normal respiratory rate and pattern.    Sustained Sleep Associated Hypoventilation - Transcutaneous carbon dioxide monitoring was not used.    Sleep Associated Hypoxemia - (Greater than 5 minutes O2 sat at or below 88%) was not present. Baseline oxygen saturation was 95.5%. Lowest oxygen saturation was 88.6%. Time spent less than or equal to 88% was 0 minutes. Time spent less than or equal to 89% was 0.1 minutes.     Movement Activity: Frequent periodic limb movements were seen, but they were not associated with significant arousals.     Periodic Limb Activity - There were 376 PLMs during the entire study. The PLM index was 60.5 movements per hour. The PLM Arousal Index was 6.1 per hour.    REM EMG Activity - Excessive muscle activity was not present.    Nocturnal Behavior - Abnormal sleep related behaviors were not noted during/arising out of NREM / REM sleep.      Bruxism - None apparent.     Cardiac Summary: Atrial fibrillation was observed.    The average pulse rate was 64.5 bpm. The minimum pulse rate was 42.9 bpm while the maximum pulse rate was 131.7 bpm. Arrhythmias were noted: a fib      Assessment:      Dental appliance was adjusted twice during the night. At the final setting of dental appliance there was significant improvement with disordered breathing events and snoring. Due to predominance of obstructive events in supine position, combination using dental appliance with positional therapy during sleep could be considered. REM supine was observed with the dental appliance during the initial adjustment, but only REM  sleep in lateral position was noted at the final setting.    Sleep architecture was remarkable for normal sleep efficiency all stages of sleep were observed.     Frequent periodic limb movements were seen, but they were not associated with significant arousals.     Atrial fibrillation was observed.        Test results were discussed with the patient in detail.      Allergies:    Allergies   Allergen Reactions     Floraquin [Iodoquinol] Anaphylaxis     LOST SENSE OF SMELL AND TASTE     Cephalosporins      Quinolones      Other reaction(s): Other - Describe In Comment Field  Lost sense of smell.  Pt would prefer to not take any of the medications in Fluoroquninolone group of antibiotics.     Cephalexin Rash     Cortisone Rash     Keflex [Cephalexin Monohydrate] Rash     Penicillins Rash     Triamcinolone Rash     Pt reports rash starting on face then spreading to arms, legs, and truck a few days after a knee injection       Medications:    Current Outpatient Medications   Medication Sig Dispense Refill     Alpha-Lipoic Acid 200 MG TABS Take by mouth daily        apixaban ANTICOAGULANT (ELIQUIS) 5 MG tablet Take 5 mg by mouth       cholecalciferol (VITAMIN D3) 125 MCG (5000 UT) TABS tablet Take by mouth daily       Cyanocobalamin (VITAMIN B-12 IJ)        dhea 25 MG TABS Take 25 mg by mouth daily       diazepam (VALIUM) 5 MG tablet Take 1 tablet 30 minutes prior to procedure. 1 tablet 0     Menaquinone-7 (VITAMIN K2 PO)        metoprolol succinate ER (TOPROL-XL) 25 MG 24 hr tablet Take 1 tablet (25 mg) by mouth daily 90 tablet 0     nystatin (MYCOSTATIN) 667419 units TABS tablet 3 times daily        Omega-3 Fatty Acids (OMEGA-3 CF PO) Take 1,280 mg by mouth daily       rivaroxaban ANTICOAGULANT (XARELTO ANTICOAGULANT) 20 MG TABS tablet Take 1 tablet (20 mg) by mouth daily (with dinner) 90 tablet 0     UNABLE TO FIND daily crateagus oxyacantha       UNABLE TO FIND Chinese Herbal Blend       UNABLE TO FIND Ayurvedic Herbs  ,oil       UNABLE TO FIND daily Sacchoromyciu       UNABLE TO FIND daily MEDICATION NAME: Butyrate 300 mg soft gel       UNABLE TO FIND MEDICATION NAME: Progesterone drops - compounded-  200mg-ML suspension - 6 drops at bedtime- apply topically       UNABLE TO FIND 2 times daily MEDICATION NAME: Estradiol, Estriol drops 30mg/ML suspension - 3 drops topically       UNABLE TO FIND MEDICATION NAME: magnesium l threonate, 1 scoop bid       UNABLE TO FIND MEDICATION NAME: reyene max liquid choline 120 mg & solicon 6 mg-6 drops         Problem List:  Patient Active Problem List    Diagnosis Date Noted     Atrial fibrillation, persistent (H) 10/15/2021     Priority: Medium     Restless legs syndrome (RLS) 09/02/2018     Priority: Medium     Plantar fasciitis 09/02/2018     Priority: Medium     HPV (human papilloma virus) infection 09/02/2018     Priority: Medium     Raynaud's disease without gangrene 09/02/2018     Priority: Medium     SABRA (obstructive sleep apnea) 09/02/2018     Priority: Medium     Age-related osteoporosis without current pathological fracture 09/02/2018     Priority: Medium     Eczema, unspecified type 09/02/2018     Priority: Medium     Dyspepsia 09/02/2018     Priority: Medium     Near syncope 09/02/2018     Priority: Medium     Multiple nevi 07/24/2018     Priority: Medium     Cherry angioma 07/24/2018     Priority: Medium     Screening for skin cancer 07/24/2018     Priority: Medium     CARDIOVASCULAR SCREENING; LDL GOAL LESS THAN 160 09/17/2015     Priority: Medium     High risk HPV infection 09/17/2015     Priority: Medium     Urticaria 09/22/2014     Priority: Medium     Problem list name updated by automated process. Provider to review       Yeast dermatitis 02/21/2014     Priority: Medium     Stress 02/21/2014     Priority: Medium     Mild major depression (H) 08/07/2013     Priority: Medium     Breast cancer screening 07/16/2012     Priority: Medium        Past Medical/Surgical History:  Past  Medical History:   Diagnosis Date     Ankylosing spondylitis (H)      HLAB19 +     Atrial fibrillation, persistent (H) 10/15/2021     Complication of anesthesia      Depressive disorder college, young adult    resolved for many years     Eosinophilic cellulitis      Mild major depression (H)      Osteopenia     -2.1; Lumbar -2.5; Fem Neck      Sleep apnea     cpap     Small intestinal bacterial overgrowth 05/2017    has had for years but finally diagnosed correctly May 2017     Vitamin deficiency      Past Surgical History:   Procedure Laterality Date     ARTHROSCOPY KNEE RT/LT Right 2004     BIOPSY  Dec. 2016    spongiotic dermatitis     COLONOSCOPY  May 2015    normal     FOOT SURGERY Right 1995    due to foot injury     FOOT SURGERY Right 2000    ayers's neuroma     LASER YAG CAPSULOTOMY Bilateral 12/29/15     PHACOEMULSIFICATION CLEAR CORNEA WITH STANDARD INTRAOCULAR LENS IMPLANT Right 10/1/14     PHACOEMULSIFICATION CLEAR CORNEA WITH STANDARD INTRAOCULAR LENS IMPLANT Left 10/08/14     SOFT TISSUE SURGERY  1995    multiple crush & fx's to right foot       Social History:  Social History     Socioeconomic History     Marital status:      Spouse name: Not on file     Number of children: Not on file     Years of education: Not on file     Highest education level: Not on file   Occupational History     Not on file   Tobacco Use     Smoking status: Never Smoker     Smokeless tobacco: Never Used   Substance and Sexual Activity     Alcohol use: Yes     Comment: once every several months     Drug use: No     Sexual activity: Never   Other Topics Concern     Parent/sibling w/ CABG, MI or angioplasty before 65F 55M? No   Social History Narrative    How much exercise per week? Walking    How much calcium per day? Boron and dairy       How much caffeine per day? 1-2 cups coffee    How much vitamin D per day? supplement    Do you/your family wear seatbelts?  Yes    Do you/your family use safety helmets? Yes    Do  you/your family use sunscreen? Yes    Do you/your family keep firearms in the home? No    Do you/your family have a smoke detector(s)? Yes        Do you feel safe in your home? Yes    Has anyone ever touched you in an unwanted manner? No     Explain          March 26, 2015 Maria G Cardenas LPN             Social Determinants of Health     Financial Resource Strain: Not on file   Food Insecurity: Not on file   Transportation Needs: Not on file   Physical Activity: Not on file   Stress: Not on file   Social Connections: Not on file   Intimate Partner Violence: Not on file   Housing Stability: Not on file       Family History:  Family History   Problem Relation Age of Onset     Endometrial Cancer Mother 73        only endometrial hyperplasia     Other - See Comments Mother         TIA     Parkinsonism Mother      Hypertension Mother      Thyroid Disease Mother      C.A.D. Maternal Grandfather      Cancer Father         AML     Other Cancer Father      Other Cancer Maternal Half-Brother      Other Cancer Cousin      Other Cancer Paternal Half-Brother      Glaucoma No family hx of      Diabetes No family hx of      Macular Degeneration No family hx of          Physical Examination:  Vitals: LMP  (LMP Unknown)   BMI= There is no height or weight on file to calculate BMI.  Depue Total Score 12/20/2021   Total score - Depue 3     General: No apparent distress, appropriately groomed  Head: Normocephalic, atraumatic  Chest: No cough, no audible wheezing, able to talk in full sentences  Psych: coherent speech, normal rate and volume, able to articulate logical thoughts, able   to abstract reason, no tangential thoughts, no hallucinations   or delusions  Her affect is normal  Neuro:  Mental status: Alert and  Oriented X 3  Speech: normal     Impression/Plan:  Obstructive sleep apnea: The results of the sleep study were discussed with the patient in detail.   Dental appliance was adjusted twice during the night. At the final  "setting of dental appliance there was significant improvement with disordered breathing events and snoring.    Recommended continuing the treatment with the dental appliance at the current setting, which is the final setting that was achieved during the night of the recent sleep study, which patient reports that she tolerates well along with positional therapy during sleep.  We discussed the options for positional therapy during sleep including FDA approved zzoma pillow for which prescription was provided if she is interested and other devices of similar type which are comparatively less expensive, do not require prescription and can be purchased through online resources such as slumber bump, sleep noodle, U shaped body pillow, tennis ball T-shirt for snoring/ sleep apnea.    Patient was instructed to call our clinic at [phone] after she obtains the positional device of her choice, in order to schedule a home sleep study which will be done while she is using the positional device of her choice along with dental appliance to check for the effectiveness of the combination treatment for sleep apnea.  Plan will be to communicate with her the test results via [employer] in approximately 10 to 14 days after the test is completed.  Orders for future HST have been generated in epic.      Atrial fibrillation: She will continue follow-up with cardiology.    We discussed weight management with healthy diet, and exercise.    Patient was strongly advised to avoid driving, operating any heavy machinery or other hazardous situations while drowsy or sleepy.  Patient was counseled on the importance of driving while alert, to pull over if drowsy, or nap before getting into the vehicle if sleepy.      CC: No ref. provider found    The above note was dictated using voice recognition software. Although reviewed after completion, some word and grammatical error may remain . Please contact the author for any clarifications.    \"I spent " "a total of 40  minutes with Shaina Calixto during today's video visit.  Most of this time was spent counseling the patient and  coordinating care regarding SABRA, dental appliance, positional therapy during sleep, home sleep study with dental appliance and positional device to check effectiveness of treatment for sleep apnea , weight management , going over results of the sleep study , chart review  including documentation and further activities as noted above.\"      Radha Borja MD  [address] [address] [phone]  Dept: [phone]                "

## 2022-01-07 ENCOUNTER — OFFICE VISIT (OUTPATIENT)
Dept: FAMILY MEDICINE | Facility: CLINIC | Age: 77
End: 2022-01-07
Payer: COMMERCIAL

## 2022-01-07 VITALS
RESPIRATION RATE: 16 BRPM | BODY MASS INDEX: 21.87 KG/M2 | WEIGHT: 128.1 LBS | HEIGHT: 64 IN | HEART RATE: 93 BPM | OXYGEN SATURATION: 96 % | DIASTOLIC BLOOD PRESSURE: 80 MMHG | SYSTOLIC BLOOD PRESSURE: 110 MMHG | TEMPERATURE: 97.6 F

## 2022-01-07 DIAGNOSIS — N18.30 STAGE 3 CHRONIC KIDNEY DISEASE, UNSPECIFIED WHETHER STAGE 3A OR 3B CKD (H): ICD-10-CM

## 2022-01-07 DIAGNOSIS — F33.42 MAJOR DEPRESSIVE DISORDER, RECURRENT EPISODE, IN FULL REMISSION (H): ICD-10-CM

## 2022-01-07 DIAGNOSIS — I48.91 ATRIAL FIBRILLATION, UNSPECIFIED TYPE (H): ICD-10-CM

## 2022-01-07 DIAGNOSIS — R30.0 DYSURIA: Primary | ICD-10-CM

## 2022-01-07 LAB
ALBUMIN UR-MCNC: NEGATIVE MG/DL
APPEARANCE UR: CLEAR
BILIRUB UR QL STRIP: NEGATIVE
COLOR UR AUTO: YELLOW
GLUCOSE UR STRIP-MCNC: NEGATIVE MG/DL
HGB UR QL STRIP: NEGATIVE
KETONES UR STRIP-MCNC: NEGATIVE MG/DL
LEUKOCYTE ESTERASE UR QL STRIP: NEGATIVE
NITRATE UR QL: NEGATIVE
PH UR STRIP: 7 [PH] (ref 5–7)
SP GR UR STRIP: 1.01 (ref 1–1.03)
UROBILINOGEN UR STRIP-ACNC: 0.2 E.U./DL

## 2022-01-07 PROCEDURE — 81003 URINALYSIS AUTO W/O SCOPE: CPT | Performed by: FAMILY MEDICINE

## 2022-01-07 PROCEDURE — 99214 OFFICE O/P EST MOD 30 MIN: CPT | Performed by: FAMILY MEDICINE

## 2022-01-07 ASSESSMENT — ENCOUNTER SYMPTOMS
CARDIOVASCULAR NEGATIVE: 1
GASTROINTESTINAL NEGATIVE: 1
FLANK PAIN: 0
CONSTITUTIONAL NEGATIVE: 1
DIFFICULTY URINATING: 0

## 2022-01-07 ASSESSMENT — ANXIETY QUESTIONNAIRES
7. FEELING AFRAID AS IF SOMETHING AWFUL MIGHT HAPPEN: NOT AT ALL
6. BECOMING EASILY ANNOYED OR IRRITABLE: NOT AT ALL
2. NOT BEING ABLE TO STOP OR CONTROL WORRYING: NOT AT ALL
GAD7 TOTAL SCORE: 0
1. FEELING NERVOUS, ANXIOUS, OR ON EDGE: NOT AT ALL
5. BEING SO RESTLESS THAT IT IS HARD TO SIT STILL: NOT AT ALL
IF YOU CHECKED OFF ANY PROBLEMS ON THIS QUESTIONNAIRE, HOW DIFFICULT HAVE THESE PROBLEMS MADE IT FOR YOU TO DO YOUR WORK, TAKE CARE OF THINGS AT HOME, OR GET ALONG WITH OTHER PEOPLE: NOT DIFFICULT AT ALL
3. WORRYING TOO MUCH ABOUT DIFFERENT THINGS: NOT AT ALL

## 2022-01-07 ASSESSMENT — PATIENT HEALTH QUESTIONNAIRE - PHQ9
SUM OF ALL RESPONSES TO PHQ QUESTIONS 1-9: 0
5. POOR APPETITE OR OVEREATING: NOT AT ALL

## 2022-01-07 ASSESSMENT — MIFFLIN-ST. JEOR: SCORE: 1061.06

## 2022-01-07 ASSESSMENT — PAIN SCALES - GENERAL: PAINLEVEL: NO PAIN (0)

## 2022-01-07 NOTE — PROGRESS NOTES
Assessment and Plan    (R30.0) Dysuria  (primary encounter diagnosis)  Comment: normal UA  Plan: UA reflex to Microscopic - lab collect            (F33.42) Major depressive disorder, recurrent episode, in full remission (H)  Comment: mood stable  Plan:     (I48.91) Atrial fibrillation, unspecified type (H)  Comment: rate controlled  Plan:     (N18.30) Stage 3 chronic kidney disease, unspecified whether stage 3a or 3b CKD (H)  Comment: stable  Plan:       RTC in 1w prn    Chay Reyes MD      Khushbu Merritt is a 76 year old who presents for the following health issues     HPI     Genitourinary - Female  Onset/Duration: 2 months ago and is here to rule out UTI  Description:   Painful urination (Dysuria): no           Frequency: YES  Blood in urine (Hematuria): no  Delay in urine (Hesitency): no  Intensity: mild, moderate  Progression of Symptoms:  same  Accompanying Signs & Symptoms:  Fever/chills: no  Flank pain: no  Nausea and vomiting: feels like she is going to throw up but does not. Started last week and happened 3x  Vaginal symptoms: transsphincteric fistula and trying to r/o UTI  Abdominal/Pelvic Pain: no  History:   History of frequent UTI s: no  History of kidney stones: no  Sexually Active: no  Possibility of pregnancy: No  Precipitating or alleviating factors: none  Therapies tried and outcome: Cranberry juice     Mostly here to r/o urinary urgency.  Has been struggling with perianal abscess and later fistula since last spring.  During preop was found to have a fib.  Fistula has still not been addressed.  Has been having some urinary urgency, and will often not have to urinate much.  Does note that she does drink a lot of water.  No vaginal concerns. Also relates that she does also have some pelvic floor dysfunction and is working with pelvic floor PT.  Does not relate an extensive history of UTI.     Review of Systems   Constitutional: Negative.    Cardiovascular: Negative.    Gastrointestinal:  "Negative.    Genitourinary: Positive for urgency. Negative for difficulty urinating, dyspareunia, flank pain and vaginal discharge.            Objective    /80 (BP Location: Right arm, Patient Position: Sitting, Cuff Size: Adult Regular)   Pulse 93   Temp 97.6  F (36.4  C) (Oral)   Resp 16   Ht 1.626 m (5' 4\")   Wt 58.1 kg (128 lb 1.6 oz)   LMP  (LMP Unknown)   SpO2 96%   BMI 21.99 kg/m    Body mass index is 21.99 kg/m .  Physical Exam  Vitals and nursing note reviewed.   Constitutional:       Appearance: She is well-developed.   Cardiovascular:      Rate and Rhythm: Normal rate and regular rhythm.      Heart sounds: Normal heart sounds.   Pulmonary:      Effort: Pulmonary effort is normal.   Skin:     General: Skin is warm and dry.   Neurological:      Mental Status: She is alert and oriented to person, place, and time.                        "

## 2022-01-08 ASSESSMENT — ANXIETY QUESTIONNAIRES: GAD7 TOTAL SCORE: 0

## 2022-01-10 ENCOUNTER — TRANSFERRED RECORDS (OUTPATIENT)
Dept: HEALTH INFORMATION MANAGEMENT | Facility: CLINIC | Age: 77
End: 2022-01-10
Payer: COMMERCIAL

## 2022-02-04 ENCOUNTER — OFFICE VISIT (OUTPATIENT)
Dept: FAMILY MEDICINE | Facility: CLINIC | Age: 77
End: 2022-02-04
Payer: COMMERCIAL

## 2022-02-04 ENCOUNTER — ANCILLARY PROCEDURE (OUTPATIENT)
Dept: GENERAL RADIOLOGY | Facility: CLINIC | Age: 77
End: 2022-02-04
Attending: NURSE PRACTITIONER
Payer: COMMERCIAL

## 2022-02-04 VITALS
SYSTOLIC BLOOD PRESSURE: 90 MMHG | HEIGHT: 64 IN | HEART RATE: 72 BPM | TEMPERATURE: 97.4 F | BODY MASS INDEX: 22.3 KG/M2 | DIASTOLIC BLOOD PRESSURE: 60 MMHG | RESPIRATION RATE: 16 BRPM | OXYGEN SATURATION: 99 % | WEIGHT: 130.6 LBS

## 2022-02-04 DIAGNOSIS — I48.19 ATRIAL FIBRILLATION, PERSISTENT (H): ICD-10-CM

## 2022-02-04 DIAGNOSIS — M79.645 THUMB PAIN, LEFT: ICD-10-CM

## 2022-02-04 DIAGNOSIS — Z13.220 SCREENING FOR HYPERLIPIDEMIA: ICD-10-CM

## 2022-02-04 DIAGNOSIS — Z11.52 ENCOUNTER FOR SCREENING FOR COVID-19: Primary | ICD-10-CM

## 2022-02-04 PROCEDURE — 99213 OFFICE O/P EST LOW 20 MIN: CPT | Performed by: NURSE PRACTITIONER

## 2022-02-04 PROCEDURE — 93000 ELECTROCARDIOGRAM COMPLETE: CPT | Performed by: NURSE PRACTITIONER

## 2022-02-04 PROCEDURE — 86769 SARS-COV-2 COVID-19 ANTIBODY: CPT | Mod: 90 | Performed by: NURSE PRACTITIONER

## 2022-02-04 PROCEDURE — 73140 X-RAY EXAM OF FINGER(S): CPT | Mod: LT | Performed by: RADIOLOGY

## 2022-02-04 PROCEDURE — 36415 COLL VENOUS BLD VENIPUNCTURE: CPT | Performed by: NURSE PRACTITIONER

## 2022-02-04 PROCEDURE — 99000 SPECIMEN HANDLING OFFICE-LAB: CPT | Performed by: NURSE PRACTITIONER

## 2022-02-04 RX ORDER — SOTALOL HYDROCHLORIDE 80 MG/1
80 TABLET ORAL DAILY
COMMUNITY
Start: 2022-02-02 | End: 2022-03-09

## 2022-02-04 ASSESSMENT — PAIN SCALES - GENERAL: PAINLEVEL: MILD PAIN (2)

## 2022-02-04 ASSESSMENT — MIFFLIN-ST. JEOR: SCORE: 1072.4

## 2022-02-04 NOTE — PROGRESS NOTES
"  Assessment & Plan         Encounter for screening for COVID-19  - COVID-19 James R[facility] [place] & [place] Reflex; Future  - COVID-19 James R[facility] [place] & [facility] Reflex    Thumb pain, left  - XR Finger Left G/E 2 Views; Standing    Atrial fibrillation, persistent (H)  Will contact her cardiologist.  - EKG 12-lead complete w/read - Clinics        No follow-ups on file.    JULES Gillette Ra CNP  [facility] STALINMOXANDER Merritt is a 76 year old who presents for the following health issues     HPI     Pain History:  When did you first notice your pain? - 1 to 6 weeks   Have you seen any provider previously for this issue? No  How has your pain affected your ability to work? Not currently working - unrelated to pain  Where in your body do you have pain? Musculoskeletal problem/pain  Onset/Duration: 1 month ago off and on  Description  Location: Thumb - left  Joint Swelling: no  Redness: no  Pain: YES  Warmth: no  Intensity:  mild  Progression of Symptoms:  Same but intermittenly  Accompanying signs and symptoms:   Fevers: no  Numbness/tingling/weakness: no  History  Trauma to the area: no  Recent illness:  no  Previous similar problem: no  Previous evaluation:  no  Precipitating or alleviating factors:  Aggravating factors include: adducting the thumb  Therapies tried and outcome: nothing    Thumb pain.  Full mobility.    Patient would like to get test to see if she has any covid antibodies.    Health issues over the past several months.  Recent repeat cardiac ablation.  She is doing well with medications.  Unsure if she is in sinus rhythm currently.    Review of Systems   Constitutional, HEENT, cardiovascular, pulmonary, gi and gu systems are negative, except as otherwise noted.      Objective    BP 90/60 (BP Location: Right arm, Patient Position: Sitting, Cuff Size: Adult Large)   Pulse 72   Temp 97.4  F (36.3  C) (Oral)   Resp 16   Ht 1.626 m (5' 4\")   Wt 59.2 kg (130 lb 9.6 oz)   LMP  (LMP " Unknown)   SpO2 99%   BMI 22.42 kg/m    Body mass index is 22.42 kg/m .  Physical Exam   GENERAL: healthy, alert and no distress  NECK: no adenopathy, no asymmetry, masses, or scars and thyroid normal to palpation  RESP: lungs clear to auscultation - no rales, rhonchi or wheezes  CV: irregularly irregular rhythm, normal S1 S2, no S3 or S4 and no murmur, click or rub  PSYCH: mentation appears normal, affect normal/bright

## 2022-02-07 LAB
SARS-COV-2 AB SERPL IA-ACNC: >250 U/ML
SARS-COV-2 AB SERPL-IMP: POSITIVE

## 2022-02-12 ENCOUNTER — HEALTH MAINTENANCE LETTER (OUTPATIENT)
Age: 77
End: 2022-02-12

## 2022-02-16 ENCOUNTER — TRANSFERRED RECORDS (OUTPATIENT)
Dept: HEALTH INFORMATION MANAGEMENT | Facility: CLINIC | Age: 77
End: 2022-02-16
Payer: COMMERCIAL

## 2022-02-23 ENCOUNTER — TRANSFERRED RECORDS (OUTPATIENT)
Dept: HEALTH INFORMATION MANAGEMENT | Facility: CLINIC | Age: 77
End: 2022-02-23
Payer: COMMERCIAL

## 2022-03-01 DIAGNOSIS — Z11.59 ENCOUNTER FOR SCREENING FOR OTHER VIRAL DISEASES: Primary | ICD-10-CM

## 2022-03-10 ENCOUNTER — OFFICE VISIT (OUTPATIENT)
Dept: FAMILY MEDICINE | Facility: CLINIC | Age: 77
End: 2022-03-10
Payer: COMMERCIAL

## 2022-03-10 VITALS
HEIGHT: 64 IN | DIASTOLIC BLOOD PRESSURE: 80 MMHG | HEART RATE: 78 BPM | TEMPERATURE: 97.5 F | WEIGHT: 131.4 LBS | OXYGEN SATURATION: 98 % | RESPIRATION RATE: 13 BRPM | BODY MASS INDEX: 22.43 KG/M2 | SYSTOLIC BLOOD PRESSURE: 136 MMHG

## 2022-03-10 DIAGNOSIS — I48.19 ATRIAL FIBRILLATION, PERSISTENT (H): ICD-10-CM

## 2022-03-10 DIAGNOSIS — Z01.818 PRE-OPERATIVE GENERAL PHYSICAL EXAMINATION: Primary | ICD-10-CM

## 2022-03-10 DIAGNOSIS — K60.30 ANAL FISTULA: ICD-10-CM

## 2022-03-10 DIAGNOSIS — E87.5 HYPERKALEMIA: ICD-10-CM

## 2022-03-10 LAB — HGB BLD-MCNC: 14.8 G/DL (ref 11.7–15.7)

## 2022-03-10 PROCEDURE — 99214 OFFICE O/P EST MOD 30 MIN: CPT | Performed by: NURSE PRACTITIONER

## 2022-03-10 PROCEDURE — 85018 HEMOGLOBIN: CPT | Performed by: NURSE PRACTITIONER

## 2022-03-10 PROCEDURE — 80053 COMPREHEN METABOLIC PANEL: CPT | Performed by: NURSE PRACTITIONER

## 2022-03-10 PROCEDURE — 36415 COLL VENOUS BLD VENIPUNCTURE: CPT | Performed by: NURSE PRACTITIONER

## 2022-03-10 ASSESSMENT — PAIN SCALES - GENERAL: PAINLEVEL: NO PAIN (0)

## 2022-03-10 ASSESSMENT — PATIENT HEALTH QUESTIONNAIRE - PHQ9: SUM OF ALL RESPONSES TO PHQ QUESTIONS 1-9: 0

## 2022-03-10 NOTE — PROGRESS NOTES
Spoke with Aaliyah at [facility] (Dr. Bonnie Rodriguez's office): [phone]  She stated that it would be ok for the patient to only hold her Eliquis for 1-2 days prior to her surgery on 3/15/2022.  If they have any problems they will contact us further.    Sonam Romero RN

## 2022-03-10 NOTE — H&P (VIEW-ONLY)
[facility]  [address] [address] [address]  Phone: [phone]  Primary Provider: Nick Holloway Ra  Pre-op Performing Provider: NICK HOLLOWAY RA      PREOPERATIVE EVALUATION:  Today's date: 3/10/2022    Shaina Calixto is a 76 year old female who presents for a preoperative evaluation.    Surgical Information:  Surgery/Procedure: Perianal biopsy and Seton Procedure   Surgery Location: [facility]  Surgeon: Dr. Bonnie Rodriguez   Surgery Date: 3/15/2022  Time of Surgery: 2:15pm  Where patient plans to recover: At home with family  Fax number for surgical facility: Note does not need to be faxed, will be available electronically in Epic.    Type of Anesthesia Anticipated: General    Assessment & Plan     The proposed surgical procedure is considered INTERMEDIATE risk.    Pre-operative general physical examination  Obtain labs  Confirmed with cardiology on stopping Eliquis preoperatively - 1-2 days  - Comprehensive metabolic panel (BMP + Alb, Alk Phos, ALT, AST, Total. Bili, TP)  - Hemoglobin  - Comprehensive metabolic panel (BMP + Alb, Alk Phos, ALT, AST, Total. Bili, TP)  - Hemoglobin    Atrial fibrillation, persistent (H)  Followed by cardiology  Chronic yet stable  - Comprehensive metabolic panel (BMP + Alb, Alk Phos, ALT, AST, Total. Bili, TP)  - Hemoglobin  - Comprehensive metabolic panel (BMP + Alb, Alk Phos, ALT, AST, Total. Bili, TP)  - Hemoglobin    Anal fistula  Seton placement with procedure      Risks and Recommendations:  The patient has the following additional risks and recommendations for perioperative complications:   - No identified additional risk factors other than previously addressed    Medication Instructions:  Vitamins/Supplements: Hold   - apixaban (Eliquis), edoxaban (Savaysa), rivaroxaban (Xarelto): Bleeding risk is moderate or high for this procedure AND CrCl  (>=) 50 mL/min. HOLD 2 days before surgery.     RECOMMENDATION:  APPROVAL GIVEN to  proceed with proposed procedure, without further diagnostic evaluation.    Reviewed chart for 10 minutes.      Subjective     HPI related to upcoming procedure: anal fistula      Preop Questions 3/10/2022   1. Have you ever had a heart attack or stroke? No   2. Have you ever had surgery on your heart or blood vessels, such as a stent placement, a coronary artery bypass, or surgery on an artery in your head, neck, heart, or legs? No   3. Do you have chest pain with activity? No   4. Do you have a history of  heart failure? No   5. Do you currently have a cold, bronchitis or symptoms of other infection? No   6. Do you have a cough, shortness of breath, or wheezing? No   7. Do you or anyone in your family have previous history of blood clots? No   8. Do you or does anyone in your family have a serious bleeding problem such as prolonged bleeding following surgeries or cuts? YES - Pt has A-fib so she is on doac   9. Have you ever had problems with anemia or been told to take iron pills? No   10. Have you had any abnormal blood loss such as black, tarry or bloody stools, or abnormal vaginal bleeding? No   11. Have you ever had a blood transfusion? No   12. Are you willing to have a blood transfusion if it is medically needed before, during, or after your surgery? Yes   13. Have you or any of your relatives ever had problems with anesthesia? No   14. Do you have sleep apnea, excessive snoring or daytime drowsiness? YES - Sleep apnea   14a. Do you have a CPAP machine? No   15. Do you have any artifical heart valves or other implanted medical devices like a pacemaker, defibrillator, or continuous glucose monitor? No   16. Do you have artificial joints? No   17. Are you allergic to latex? No     Health Care Directive:  Patient does not have a Health Care Directive or Living Will: Discussed advance care planning with patient; however, patient declined at this time.    Preoperative Review of :   reviewed - no record of  controlled substances prescribed.        Review of Systems  CONSTITUTIONAL: NEGATIVE for fever, chills, change in weight  INTEGUMENTARY/SKIN: NEGATIVE for worrisome rashes, moles or lesions  EYES: NEGATIVE for vision changes or irritation  ENT/MOUTH: NEGATIVE for ear, mouth and throat problems  RESP: NEGATIVE for significant cough or SOB  CV: NEGATIVE for chest pain, palpitations or peripheral edema  GI: NEGATIVE for nausea, abdominal pain, heartburn, or change in bowel habits  : NEGATIVE for frequency, dysuria, or hematuria  MUSCULOSKELETAL: NEGATIVE for significant arthralgias or myalgia  NEURO: NEGATIVE for weakness, dizziness or paresthesias  ENDOCRINE: NEGATIVE for temperature intolerance, skin/hair changes  HEME: NEGATIVE for bleeding problems  PSYCHIATRIC: NEGATIVE for changes in mood or affect    Patient Active Problem List    Diagnosis Date Noted     Major depressive disorder, recurrent episode, in full remission (H) 01/07/2022     Priority: Medium     Stage 3 chronic kidney disease, unspecified whether stage 3a or 3b CKD (H) 01/07/2022     Priority: Medium     Atrial fibrillation, persistent (H) 10/15/2021     Priority: Medium     Restless legs syndrome (RLS) 09/02/2018     Priority: Medium     Plantar fasciitis 09/02/2018     Priority: Medium     HPV (human papilloma virus) infection 09/02/2018     Priority: Medium     Raynaud's disease without gangrene 09/02/2018     Priority: Medium     SABRA (obstructive sleep apnea) 09/02/2018     Priority: Medium     Age-related osteoporosis without current pathological fracture 09/02/2018     Priority: Medium     Eczema, unspecified type 09/02/2018     Priority: Medium     Dyspepsia 09/02/2018     Priority: Medium     Near syncope 09/02/2018     Priority: Medium     Multiple nevi 07/24/2018     Priority: Medium     Cherry angioma 07/24/2018     Priority: Medium     Screening for skin cancer 07/24/2018     Priority: Medium     CARDIOVASCULAR SCREENING; LDL GOAL LESS  THAN 160 09/17/2015     Priority: Medium     High risk HPV infection 09/17/2015     Priority: Medium     Urticaria 09/22/2014     Priority: Medium     Problem list name updated by automated process. Provider to review       Yeast dermatitis 02/21/2014     Priority: Medium     Stress 02/21/2014     Priority: Medium     Mild major depression (H) 08/07/2013     Priority: Medium     Breast cancer screening 07/16/2012     Priority: Medium      Past Medical History:   Diagnosis Date     Ankylosing spondylitis (H)      HLAB19 +     Atrial fibrillation, persistent (H) 10/15/2021     Complication of anesthesia      Depressive disorder college, young adult    resolved for many years     Eosinophilic cellulitis      Mild major depression (H)      Osteopenia     -2.1; Lumbar -2.5; Fem Neck      Sleep apnea     Has a Mouth comliance     Small intestinal bacterial overgrowth 05/2017    has had for years but finally diagnosed correctly May 2017     Vitamin deficiency      Past Surgical History:   Procedure Laterality Date     ARTHROSCOPY KNEE RT/LT Right 2004     BIOPSY  Dec. 2016    spongiotic dermatitis     CARDIAC SURGERY  01/24/2022    cardioversion      COLONOSCOPY  May 2015    normal     FOOT SURGERY Right 1995    due to foot injury     FOOT SURGERY Right 2000    ayers's neuroma     LASER YAG CAPSULOTOMY Bilateral 12/29/15     PHACOEMULSIFICATION CLEAR CORNEA WITH STANDARD INTRAOCULAR LENS IMPLANT Right 10/1/14     PHACOEMULSIFICATION CLEAR CORNEA WITH STANDARD INTRAOCULAR LENS IMPLANT Left 10/08/14     SOFT TISSUE SURGERY  1995    multiple crush & fx's to right foot     Current Outpatient Medications   Medication Sig Dispense Refill     apixaban ANTICOAGULANT (ELIQUIS) 5 MG tablet Take 5 mg by mouth 2 times daily        cholecalciferol (VITAMIN D3) 125 MCG (5000 UT) TABS tablet Take 1 tablet by mouth daily        Menaquinone-7 (VITAMIN K2 PO)        metoprolol succinate ER (TOPROL-XL) 25 MG 24 hr tablet Take 1 tablet (25  mg) by mouth daily (Patient taking differently: Take 25 mg by mouth every evening ) 90 tablet 0     UNABLE TO FIND daily crateagus oxyacantha       UNABLE TO FIND Chinese Herbal Blend        UNABLE TO FIND Ayurvedic Herbs ,oil       UNABLE TO FIND daily MEDICATION NAME: Butyrate 300 mg soft gel       UNABLE TO FIND MEDICATION NAME: Progesterone drops - compounded-  200mg-ML suspension - 6 drops at bedtime- apply topically       UNABLE TO FIND 2 times daily MEDICATION NAME: Estradiol, Estriol drops 30mg/ML suspension - 3 drops topically       UNABLE TO FIND MEDICATION NAME: magnesium l threonate, 1 scoop bid         Allergies   Allergen Reactions     Floraquin [Iodoquinol] Anaphylaxis     LOST SENSE OF SMELL AND TASTE     Cephalosporins      Quinolones      Other reaction(s): Other - Describe In Comment Field  Lost sense of smell.  Pt would prefer to not take any of the medications in Fluoroquninolone group of antibiotics.     Cephalexin Rash     Cortisone Rash     Keflex [Cephalexin Monohydrate] Rash     Penicillins Rash     Triamcinolone Rash     Pt reports rash starting on face then spreading to arms, legs, and truck a few days after a knee injection        Social History     Tobacco Use     Smoking status: Never Smoker     Smokeless tobacco: Never Used   Substance Use Topics     Alcohol use: Yes     Comment: once every several months     Family History   Problem Relation Age of Onset     Endometrial Cancer Mother 73        only endometrial hyperplasia     Other - See Comments Mother         TIA     Parkinsonism Mother      Hypertension Mother      Thyroid Disease Mother      C.A.D. Maternal Grandfather      Cancer Father         AML     Other Cancer Father      Other Cancer Maternal Half-Brother      Other Cancer Cousin      Other Cancer Paternal Half-Brother      Other - See Comments Cousin      Glaucoma No family hx of      Diabetes No family hx of      Macular Degeneration No family hx of      History   Drug  "Use No         Objective     /80 (BP Location: Right arm, Patient Position: Sitting, Cuff Size: Adult Regular)   Pulse 78   Temp 97.5  F (36.4  C) (Oral)   Resp 13   Ht 1.626 m (5' 4\")   Wt 59.6 kg (131 lb 6.4 oz)   LMP  (LMP Unknown)   SpO2 98%   BMI 22.55 kg/m      Physical Exam    GENERAL APPEARANCE: healthy, alert and no distress     EYES: EOMI, PERRL     HENT: ear canals and TM's normal and nose and mouth without ulcers or lesions     NECK: no adenopathy, no asymmetry, masses, or scars and thyroid normal to palpation     RESP: lungs clear to auscultation - no rales, rhonchi or wheezes     CV: irregular rhythm, no peripheral edema     ABDOMEN:  soft, nontender, no HSM or masses and bowel sounds normal     MS: extremities normal- no gross deformities noted, no evidence of inflammation in joints, FROM in all extremities.     SKIN: no suspicious lesions or rashes     NEURO: Normal strength and tone, sensory exam grossly normal, mentation intact and speech normal     PSYCH: mentation appears normal. and affect normal/bright     LYMPHATICS: No cervical adenopathy    Recent Labs   Lab Test 04/23/21  1414 11/19/20  0821 03/13/20  0820   HGB 15.4  --   --      --   --    NA  --  139 139   POTASSIUM  --  3.7 4.3   CR  --  0.93 0.94   A1C  --  5.5  --         Diagnostics:  Labs pending at this time.  Results will be reviewed when available.   EKG: atrial fibrillation, rate 76BPM, Right Bundle Branch Block, unchanged from previous tracings    Revised Cardiac Risk Index (RCRI):  The patient has the following serious cardiovascular risks for perioperative complications:   - No serious cardiac risks = 0 points     RCRI Interpretation: 0 points: Class I (very low risk - 0.4% complication rate)       Mila Gonzalez  NP Student    I have discussed the patient's presenting complaint(s) with the np student and agree with the history, physical exam and plan as documented above. Her progress note reflects our " assessment and plan.      Signed Electronically by: JULES Gillette Ra, CNP  Copy of this evaluation report is provided to requesting physician.

## 2022-03-10 NOTE — PROGRESS NOTES
[facility]  [address] [facility] [address]  Phone: [phone]  Primary Provider: Nick Holloway Ra  Pre-op Performing Provider: NICK HOLLOWAY RA      PREOPERATIVE EVALUATION:  Today's date: 3/10/2022    Shaina Calixto is a 76 year old female who presents for a preoperative evaluation.    Surgical Information:  Surgery/Procedure: Perianal biopsy and Seton Procedure   Surgery Location: [facility]  Surgeon: Dr. Bonnie Rodriguez   Surgery Date: 3/15/2022  Time of Surgery: 2:15pm  Where patient plans to recover: At home with family  Fax number for surgical facility: Note does not need to be faxed, will be available electronically in Epic.    Type of Anesthesia Anticipated: General    Assessment & Plan     The proposed surgical procedure is considered INTERMEDIATE risk.    Pre-operative general physical examination  Obtain labs  Confirmed with cardiology on stopping Eliquis preoperatively - 1-2 days  - Comprehensive metabolic panel (BMP + Alb, Alk Phos, ALT, AST, Total. Bili, TP)  - Hemoglobin  - Comprehensive metabolic panel (BMP + Alb, Alk Phos, ALT, AST, Total. Bili, TP)  - Hemoglobin    Atrial fibrillation, persistent (H)  Followed by cardiology  Chronic yet stable  - Comprehensive metabolic panel (BMP + Alb, Alk Phos, ALT, AST, Total. Bili, TP)  - Hemoglobin  - Comprehensive metabolic panel (BMP + Alb, Alk Phos, ALT, AST, Total. Bili, TP)  - Hemoglobin    Anal fistula  Seton placement with procedure      Risks and Recommendations:  The patient has the following additional risks and recommendations for perioperative complications:   - No identified additional risk factors other than previously addressed    Medication Instructions:  Vitamins/Supplements: Hold   - apixaban (Eliquis), edoxaban (Savaysa), rivaroxaban (Xarelto): Bleeding risk is moderate or high for this procedure AND CrCl  (>=) 50 mL/min. HOLD 2 days before surgery.     RECOMMENDATION:  APPROVAL GIVEN to  proceed with proposed procedure, without further diagnostic evaluation.    Reviewed chart for 10 minutes.      Subjective     HPI related to upcoming procedure: anal fistula      Preop Questions 3/10/2022   1. Have you ever had a heart attack or stroke? No   2. Have you ever had surgery on your heart or blood vessels, such as a stent placement, a coronary artery bypass, or surgery on an artery in your head, neck, heart, or legs? No   3. Do you have chest pain with activity? No   4. Do you have a history of  heart failure? No   5. Do you currently have a cold, bronchitis or symptoms of other infection? No   6. Do you have a cough, shortness of breath, or wheezing? No   7. Do you or anyone in your family have previous history of blood clots? No   8. Do you or does anyone in your family have a serious bleeding problem such as prolonged bleeding following surgeries or cuts? YES - Pt has A-fib so she is on doac   9. Have you ever had problems with anemia or been told to take iron pills? No   10. Have you had any abnormal blood loss such as black, tarry or bloody stools, or abnormal vaginal bleeding? No   11. Have you ever had a blood transfusion? No   12. Are you willing to have a blood transfusion if it is medically needed before, during, or after your surgery? Yes   13. Have you or any of your relatives ever had problems with anesthesia? No   14. Do you have sleep apnea, excessive snoring or daytime drowsiness? YES - Sleep apnea   14a. Do you have a CPAP machine? No   15. Do you have any artifical heart valves or other implanted medical devices like a pacemaker, defibrillator, or continuous glucose monitor? No   16. Do you have artificial joints? No   17. Are you allergic to latex? No     Health Care Directive:  Patient does not have a Health Care Directive or Living Will: Discussed advance care planning with patient; however, patient declined at this time.    Preoperative Review of :   reviewed - no record of  controlled substances prescribed.        Review of Systems  CONSTITUTIONAL: NEGATIVE for fever, chills, change in weight  INTEGUMENTARY/SKIN: NEGATIVE for worrisome rashes, moles or lesions  EYES: NEGATIVE for vision changes or irritation  ENT/MOUTH: NEGATIVE for ear, mouth and throat problems  RESP: NEGATIVE for significant cough or SOB  CV: NEGATIVE for chest pain, palpitations or peripheral edema  GI: NEGATIVE for nausea, abdominal pain, heartburn, or change in bowel habits  : NEGATIVE for frequency, dysuria, or hematuria  MUSCULOSKELETAL: NEGATIVE for significant arthralgias or myalgia  NEURO: NEGATIVE for weakness, dizziness or paresthesias  ENDOCRINE: NEGATIVE for temperature intolerance, skin/hair changes  HEME: NEGATIVE for bleeding problems  PSYCHIATRIC: NEGATIVE for changes in mood or affect    Patient Active Problem List    Diagnosis Date Noted     Major depressive disorder, recurrent episode, in full remission (H) 01/07/2022     Priority: Medium     Stage 3 chronic kidney disease, unspecified whether stage 3a or 3b CKD (H) 01/07/2022     Priority: Medium     Atrial fibrillation, persistent (H) 10/15/2021     Priority: Medium     Restless legs syndrome (RLS) 09/02/2018     Priority: Medium     Plantar fasciitis 09/02/2018     Priority: Medium     HPV (human papilloma virus) infection 09/02/2018     Priority: Medium     Raynaud's disease without gangrene 09/02/2018     Priority: Medium     SABRA (obstructive sleep apnea) 09/02/2018     Priority: Medium     Age-related osteoporosis without current pathological fracture 09/02/2018     Priority: Medium     Eczema, unspecified type 09/02/2018     Priority: Medium     Dyspepsia 09/02/2018     Priority: Medium     Near syncope 09/02/2018     Priority: Medium     Multiple nevi 07/24/2018     Priority: Medium     Cherry angioma 07/24/2018     Priority: Medium     Screening for skin cancer 07/24/2018     Priority: Medium     CARDIOVASCULAR SCREENING; LDL GOAL LESS  THAN 160 09/17/2015     Priority: Medium     High risk HPV infection 09/17/2015     Priority: Medium     Urticaria 09/22/2014     Priority: Medium     Problem list name updated by automated process. Provider to review       Yeast dermatitis 02/21/2014     Priority: Medium     Stress 02/21/2014     Priority: Medium     Mild major depression (H) 08/07/2013     Priority: Medium     Breast cancer screening 07/16/2012     Priority: Medium      Past Medical History:   Diagnosis Date     Ankylosing spondylitis (H)      HLAB19 +     Atrial fibrillation, persistent (H) 10/15/2021     Complication of anesthesia      Depressive disorder college, young adult    resolved for many years     Eosinophilic cellulitis      Mild major depression (H)      Osteopenia     -2.1; Lumbar -2.5; Fem Neck      Sleep apnea     Has a Mouth comliance     Small intestinal bacterial overgrowth 05/2017    has had for years but finally diagnosed correctly May 2017     Vitamin deficiency      Past Surgical History:   Procedure Laterality Date     ARTHROSCOPY KNEE RT/LT Right 2004     BIOPSY  Dec. 2016    spongiotic dermatitis     CARDIAC SURGERY  01/24/2022    cardioversion      COLONOSCOPY  May 2015    normal     FOOT SURGERY Right 1995    due to foot injury     FOOT SURGERY Right 2000    ayers's neuroma     LASER YAG CAPSULOTOMY Bilateral 12/29/15     PHACOEMULSIFICATION CLEAR CORNEA WITH STANDARD INTRAOCULAR LENS IMPLANT Right 10/1/14     PHACOEMULSIFICATION CLEAR CORNEA WITH STANDARD INTRAOCULAR LENS IMPLANT Left 10/08/14     SOFT TISSUE SURGERY  1995    multiple crush & fx's to right foot     Current Outpatient Medications   Medication Sig Dispense Refill     apixaban ANTICOAGULANT (ELIQUIS) 5 MG tablet Take 5 mg by mouth 2 times daily        cholecalciferol (VITAMIN D3) 125 MCG (5000 UT) TABS tablet Take 1 tablet by mouth daily        Menaquinone-7 (VITAMIN K2 PO)        metoprolol succinate ER (TOPROL-XL) 25 MG 24 hr tablet Take 1 tablet (25  mg) by mouth daily (Patient taking differently: Take 25 mg by mouth every evening ) 90 tablet 0     UNABLE TO FIND daily crateagus oxyacantha       UNABLE TO FIND Chinese Herbal Blend        UNABLE TO FIND Ayurvedic Herbs ,oil       UNABLE TO FIND daily MEDICATION NAME: Butyrate 300 mg soft gel       UNABLE TO FIND MEDICATION NAME: Progesterone drops - compounded-  200mg-ML suspension - 6 drops at bedtime- apply topically       UNABLE TO FIND 2 times daily MEDICATION NAME: Estradiol, Estriol drops 30mg/ML suspension - 3 drops topically       UNABLE TO FIND MEDICATION NAME: magnesium l threonate, 1 scoop bid         Allergies   Allergen Reactions     Floraquin [Iodoquinol] Anaphylaxis     LOST SENSE OF SMELL AND TASTE     Cephalosporins      Quinolones      Other reaction(s): Other - Describe In Comment Field  Lost sense of smell.  Pt would prefer to not take any of the medications in Fluoroquninolone group of antibiotics.     Cephalexin Rash     Cortisone Rash     Keflex [Cephalexin Monohydrate] Rash     Penicillins Rash     Triamcinolone Rash     Pt reports rash starting on face then spreading to arms, legs, and truck a few days after a knee injection        Social History     Tobacco Use     Smoking status: Never Smoker     Smokeless tobacco: Never Used   Substance Use Topics     Alcohol use: Yes     Comment: once every several months     Family History   Problem Relation Age of Onset     Endometrial Cancer Mother 73        only endometrial hyperplasia     Other - See Comments Mother         TIA     Parkinsonism Mother      Hypertension Mother      Thyroid Disease Mother      C.A.D. Maternal Grandfather      Cancer Father         AML     Other Cancer Father      Other Cancer Maternal Half-Brother      Other Cancer Cousin      Other Cancer Paternal Half-Brother      Other - See Comments Cousin      Glaucoma No family hx of      Diabetes No family hx of      Macular Degeneration No family hx of      History   Drug  "Use No         Objective     /80 (BP Location: Right arm, Patient Position: Sitting, Cuff Size: Adult Regular)   Pulse 78   Temp 97.5  F (36.4  C) (Oral)   Resp 13   Ht 1.626 m (5' 4\")   Wt 59.6 kg (131 lb 6.4 oz)   LMP  (LMP Unknown)   SpO2 98%   BMI 22.55 kg/m      Physical Exam    GENERAL APPEARANCE: healthy, alert and no distress     EYES: EOMI, PERRL     HENT: ear canals and TM's normal and nose and mouth without ulcers or lesions     NECK: no adenopathy, no asymmetry, masses, or scars and thyroid normal to palpation     RESP: lungs clear to auscultation - no rales, rhonchi or wheezes     CV: irregular rhythm, no peripheral edema     ABDOMEN:  soft, nontender, no HSM or masses and bowel sounds normal     MS: extremities normal- no gross deformities noted, no evidence of inflammation in joints, FROM in all extremities.     SKIN: no suspicious lesions or rashes     NEURO: Normal strength and tone, sensory exam grossly normal, mentation intact and speech normal     PSYCH: mentation appears normal. and affect normal/bright     LYMPHATICS: No cervical adenopathy    Recent Labs   Lab Test 04/23/21  1414 11/19/20  0821 03/13/20  0820   HGB 15.4  --   --      --   --    NA  --  139 139   POTASSIUM  --  3.7 4.3   CR  --  0.93 0.94   A1C  --  5.5  --         Diagnostics:  Labs pending at this time.  Results will be reviewed when available.   EKG: atrial fibrillation, rate 76BPM, Right Bundle Branch Block, unchanged from previous tracings    Revised Cardiac Risk Index (RCRI):  The patient has the following serious cardiovascular risks for perioperative complications:   - No serious cardiac risks = 0 points     RCRI Interpretation: 0 points: Class I (very low risk - 0.4% complication rate)       Mila Gonzalez  NP Student    I have discussed the patient's presenting complaint(s) with the np student and agree with the history, physical exam and plan as documented above. Her progress note reflects our " assessment and plan.      Signed Electronically by: JULES Gillette Ra, CNP  Copy of this evaluation report is provided to requesting physician.

## 2022-03-11 ENCOUNTER — TELEPHONE (OUTPATIENT)
Dept: FAMILY MEDICINE | Facility: CLINIC | Age: 77
End: 2022-03-11

## 2022-03-11 ENCOUNTER — LAB (OUTPATIENT)
Dept: LAB | Facility: CLINIC | Age: 77
End: 2022-03-11
Payer: COMMERCIAL

## 2022-03-11 ENCOUNTER — LAB (OUTPATIENT)
Dept: LAB | Facility: CLINIC | Age: 77
End: 2022-03-11
Attending: COLON & RECTAL SURGERY
Payer: COMMERCIAL

## 2022-03-11 DIAGNOSIS — Z13.220 SCREENING FOR HYPERLIPIDEMIA: ICD-10-CM

## 2022-03-11 DIAGNOSIS — E87.5 HYPERKALEMIA: ICD-10-CM

## 2022-03-11 DIAGNOSIS — N18.30 STAGE 3 CHRONIC KIDNEY DISEASE, UNSPECIFIED WHETHER STAGE 3A OR 3B CKD (H): Primary | ICD-10-CM

## 2022-03-11 DIAGNOSIS — Z11.59 ENCOUNTER FOR SCREENING FOR OTHER VIRAL DISEASES: ICD-10-CM

## 2022-03-11 LAB
ALBUMIN SERPL-MCNC: 3.4 G/DL (ref 3.4–5)
ALP SERPL-CCNC: 93 U/L (ref 40–150)
ALT SERPL W P-5'-P-CCNC: 26 U/L (ref 0–50)
ANION GAP SERPL CALCULATED.3IONS-SCNC: 4 MMOL/L (ref 3–14)
ANION GAP SERPL CALCULATED.3IONS-SCNC: 5 MMOL/L (ref 3–14)
AST SERPL W P-5'-P-CCNC: 17 U/L (ref 0–45)
BILIRUB SERPL-MCNC: 0.4 MG/DL (ref 0.2–1.3)
BUN SERPL-MCNC: 19 MG/DL (ref 7–30)
BUN SERPL-MCNC: 31 MG/DL (ref 7–30)
CALCIUM SERPL-MCNC: 10.5 MG/DL (ref 8.5–10.1)
CALCIUM SERPL-MCNC: 8.8 MG/DL (ref 8.5–10.1)
CHLORIDE BLD-SCNC: 105 MMOL/L (ref 94–109)
CHLORIDE BLD-SCNC: 106 MMOL/L (ref 94–109)
CO2 SERPL-SCNC: 27 MMOL/L (ref 20–32)
CO2 SERPL-SCNC: 29 MMOL/L (ref 20–32)
CREAT SERPL-MCNC: 0.94 MG/DL (ref 0.52–1.04)
CREAT SERPL-MCNC: 1.02 MG/DL (ref 0.52–1.04)
GFR SERPL CREATININE-BSD FRML MDRD: 57 ML/MIN/1.73M2
GFR SERPL CREATININE-BSD FRML MDRD: 63 ML/MIN/1.73M2
GLUCOSE BLD-MCNC: 75 MG/DL (ref 70–99)
GLUCOSE BLD-MCNC: 99 MG/DL (ref 70–99)
POTASSIUM BLD-SCNC: 3.9 MMOL/L (ref 3.4–5.3)
POTASSIUM BLD-SCNC: 6.3 MMOL/L (ref 3.4–5.3)
PROT SERPL-MCNC: 6.8 G/DL (ref 6.8–8.8)
SARS-COV-2 RNA RESP QL NAA+PROBE: NEGATIVE
SODIUM SERPL-SCNC: 138 MMOL/L (ref 133–144)
SODIUM SERPL-SCNC: 138 MMOL/L (ref 133–144)

## 2022-03-11 PROCEDURE — 36415 COLL VENOUS BLD VENIPUNCTURE: CPT

## 2022-03-11 PROCEDURE — 80048 BASIC METABOLIC PNL TOTAL CA: CPT

## 2022-03-11 PROCEDURE — U0003 INFECTIOUS AGENT DETECTION BY NUCLEIC ACID (DNA OR RNA); SEVERE ACUTE RESPIRATORY SYNDROME CORONAVIRUS 2 (SARS-COV-2) (CORONAVIRUS DISEASE [COVID-19]), AMPLIFIED PROBE TECHNIQUE, MAKING USE OF HIGH THROUGHPUT TECHNOLOGIES AS DESCRIBED BY CMS-2020-01-R: HCPCS

## 2022-03-11 NOTE — TELEPHONE ENCOUNTER
Potassium   Date Value Ref Range Status   03/10/2022 6.3 (HH) 3.4 - 5.3 mmol/L Final   11/19/2020 3.7 3.4 - 5.3 mmol/L Final     Per Ami Aguilar, she would like patient to come in this am to have her potassium repeated as it is elevated.  Spoke with lab and we can put her on the schedule at any time this am.  Patient had her pre-procedure covid test scheduled at [facility] at 1:45pm today.  She can have this done at the [place] lab when she comes in for her potassium blood work.  We would just need to cancel her appointment at [facility].    Appointments in Next Year    Mar 11, 2022  1:45 PM  Pre-procedure Covid with  COVID LAB  [facility] ([facility] ) [phone]        Left a detailed message on patients phone to call the clinic back and talk to any triage nurse.    Sonam Romero RN

## 2022-03-15 ENCOUNTER — ANESTHESIA EVENT (OUTPATIENT)
Dept: SURGERY | Facility: CLINIC | Age: 77
End: 2022-03-15
Payer: COMMERCIAL

## 2022-03-15 ENCOUNTER — HOSPITAL ENCOUNTER (OUTPATIENT)
Facility: CLINIC | Age: 77
Discharge: HOME | End: 2022-03-15
Attending: COLON & RECTAL SURGERY | Admitting: COLON & RECTAL SURGERY
Payer: COMMERCIAL

## 2022-03-15 ENCOUNTER — ANESTHESIA (OUTPATIENT)
Dept: SURGERY | Facility: CLINIC | Age: 77
End: 2022-03-15
Payer: COMMERCIAL

## 2022-03-15 VITALS
DIASTOLIC BLOOD PRESSURE: 98 MMHG | RESPIRATION RATE: 14 BRPM | WEIGHT: 131 LBS | HEART RATE: 80 BPM | BODY MASS INDEX: 22.36 KG/M2 | TEMPERATURE: 97.9 F | HEIGHT: 64 IN | SYSTOLIC BLOOD PRESSURE: 131 MMHG | OXYGEN SATURATION: 99 %

## 2022-03-15 PROCEDURE — 272N000001 HC OR GENERAL SUPPLY STERILE: Performed by: COLON & RECTAL SURGERY

## 2022-03-15 PROCEDURE — 370N000017 HC ANESTHESIA TECHNICAL FEE, PER MIN: Performed by: COLON & RECTAL SURGERY

## 2022-03-15 PROCEDURE — 360N000075 HC SURGERY LEVEL 2, PER MIN: Performed by: COLON & RECTAL SURGERY

## 2022-03-15 PROCEDURE — 250N000011 HC RX IP 250 OP 636: Performed by: NURSE ANESTHETIST, CERTIFIED REGISTERED

## 2022-03-15 PROCEDURE — 258N000003 HC RX IP 258 OP 636: Performed by: NURSE ANESTHETIST, CERTIFIED REGISTERED

## 2022-03-15 PROCEDURE — 710N000012 HC RECOVERY PHASE 2, PER MINUTE: Performed by: COLON & RECTAL SURGERY

## 2022-03-15 PROCEDURE — 250N000009 HC RX 250: Performed by: NURSE ANESTHETIST, CERTIFIED REGISTERED

## 2022-03-15 PROCEDURE — 258N000003 HC RX IP 258 OP 636: Performed by: ANESTHESIOLOGY

## 2022-03-15 PROCEDURE — 250N000009 HC RX 250: Performed by: COLON & RECTAL SURGERY

## 2022-03-15 PROCEDURE — 88305 TISSUE EXAM BY PATHOLOGIST: CPT | Mod: TC | Performed by: COLON & RECTAL SURGERY

## 2022-03-15 PROCEDURE — 999N000141 HC STATISTIC PRE-PROCEDURE NURSING ASSESSMENT: Performed by: COLON & RECTAL SURGERY

## 2022-03-15 RX ORDER — SODIUM CHLORIDE, SODIUM LACTATE, POTASSIUM CHLORIDE, CALCIUM CHLORIDE 600; 310; 30; 20 MG/100ML; MG/100ML; MG/100ML; MG/100ML
INJECTION, SOLUTION INTRAVENOUS CONTINUOUS PRN
Status: DISCONTINUED | OUTPATIENT
Start: 2022-03-15 | End: 2022-03-15

## 2022-03-15 RX ORDER — PROPOFOL 10 MG/ML
INJECTION, EMULSION INTRAVENOUS CONTINUOUS PRN
Status: DISCONTINUED | OUTPATIENT
Start: 2022-03-15 | End: 2022-03-15

## 2022-03-15 RX ORDER — OXYCODONE HYDROCHLORIDE 5 MG/1
5 TABLET ORAL EVERY 4 HOURS PRN
Status: DISCONTINUED | OUTPATIENT
Start: 2022-03-15 | End: 2022-03-15 | Stop reason: HOSPADM

## 2022-03-15 RX ORDER — PROPOFOL 10 MG/ML
INJECTION, EMULSION INTRAVENOUS PRN
Status: DISCONTINUED | OUTPATIENT
Start: 2022-03-15 | End: 2022-03-15

## 2022-03-15 RX ORDER — SODIUM CHLORIDE, SODIUM LACTATE, POTASSIUM CHLORIDE, CALCIUM CHLORIDE 600; 310; 30; 20 MG/100ML; MG/100ML; MG/100ML; MG/100ML
INJECTION, SOLUTION INTRAVENOUS CONTINUOUS
Status: DISCONTINUED | OUTPATIENT
Start: 2022-03-15 | End: 2022-03-15 | Stop reason: HOSPADM

## 2022-03-15 RX ORDER — FENTANYL CITRATE 50 UG/ML
25 INJECTION, SOLUTION INTRAMUSCULAR; INTRAVENOUS EVERY 5 MIN PRN
Status: DISCONTINUED | OUTPATIENT
Start: 2022-03-15 | End: 2022-03-15 | Stop reason: HOSPADM

## 2022-03-15 RX ORDER — MEPERIDINE HYDROCHLORIDE 25 MG/ML
12.5 INJECTION INTRAMUSCULAR; INTRAVENOUS; SUBCUTANEOUS
Status: DISCONTINUED | OUTPATIENT
Start: 2022-03-15 | End: 2022-03-15 | Stop reason: HOSPADM

## 2022-03-15 RX ORDER — ACETAMINOPHEN 325 MG/1
975 TABLET ORAL ONCE
Status: DISCONTINUED | OUTPATIENT
Start: 2022-03-15 | End: 2022-03-15 | Stop reason: HOSPADM

## 2022-03-15 RX ORDER — LIDOCAINE HYDROCHLORIDE 10 MG/ML
INJECTION, SOLUTION INFILTRATION; PERINEURAL PRN
Status: DISCONTINUED | OUTPATIENT
Start: 2022-03-15 | End: 2022-03-15

## 2022-03-15 RX ORDER — ONDANSETRON 4 MG/1
4 TABLET, ORALLY DISINTEGRATING ORAL EVERY 30 MIN PRN
Status: DISCONTINUED | OUTPATIENT
Start: 2022-03-15 | End: 2022-03-15 | Stop reason: HOSPADM

## 2022-03-15 RX ORDER — ONDANSETRON 2 MG/ML
INJECTION INTRAMUSCULAR; INTRAVENOUS PRN
Status: DISCONTINUED | OUTPATIENT
Start: 2022-03-15 | End: 2022-03-15

## 2022-03-15 RX ORDER — METOPROLOL TARTRATE 1 MG/ML
1-2 INJECTION, SOLUTION INTRAVENOUS EVERY 5 MIN PRN
Status: DISCONTINUED | OUTPATIENT
Start: 2022-03-15 | End: 2022-03-15 | Stop reason: HOSPADM

## 2022-03-15 RX ORDER — BUPIVACAINE HYDROCHLORIDE AND EPINEPHRINE 2.5; 5 MG/ML; UG/ML
INJECTION, SOLUTION EPIDURAL; INFILTRATION; INTRACAUDAL; PERINEURAL PRN
Status: DISCONTINUED | OUTPATIENT
Start: 2022-03-15 | End: 2022-03-15 | Stop reason: HOSPADM

## 2022-03-15 RX ORDER — ONDANSETRON 2 MG/ML
4 INJECTION INTRAMUSCULAR; INTRAVENOUS EVERY 30 MIN PRN
Status: DISCONTINUED | OUTPATIENT
Start: 2022-03-15 | End: 2022-03-15 | Stop reason: HOSPADM

## 2022-03-15 RX ORDER — GLYCOPYRROLATE 0.2 MG/ML
INJECTION, SOLUTION INTRAMUSCULAR; INTRAVENOUS PRN
Status: DISCONTINUED | OUTPATIENT
Start: 2022-03-15 | End: 2022-03-15

## 2022-03-15 RX ORDER — FENTANYL CITRATE 50 UG/ML
25 INJECTION, SOLUTION INTRAMUSCULAR; INTRAVENOUS
Status: DISCONTINUED | OUTPATIENT
Start: 2022-03-15 | End: 2022-03-15 | Stop reason: HOSPADM

## 2022-03-15 RX ORDER — KETAMINE HYDROCHLORIDE 10 MG/ML
INJECTION INTRAMUSCULAR; INTRAVENOUS PRN
Status: DISCONTINUED | OUTPATIENT
Start: 2022-03-15 | End: 2022-03-15

## 2022-03-15 RX ORDER — LIDOCAINE 40 MG/G
CREAM TOPICAL
Status: DISCONTINUED | OUTPATIENT
Start: 2022-03-15 | End: 2022-03-15 | Stop reason: HOSPADM

## 2022-03-15 RX ADMIN — SODIUM CHLORIDE, POTASSIUM CHLORIDE, SODIUM LACTATE AND CALCIUM CHLORIDE: 600; 310; 30; 20 INJECTION, SOLUTION INTRAVENOUS at 14:27

## 2022-03-15 RX ADMIN — LIDOCAINE HYDROCHLORIDE 20 MG: 10 INJECTION, SOLUTION INFILTRATION; PERINEURAL at 14:34

## 2022-03-15 RX ADMIN — MIDAZOLAM 1 MG: 1 INJECTION INTRAMUSCULAR; INTRAVENOUS at 14:31

## 2022-03-15 RX ADMIN — PROPOFOL 75 MCG/KG/MIN: 10 INJECTION, EMULSION INTRAVENOUS at 14:37

## 2022-03-15 RX ADMIN — PROPOFOL 20 MG: 10 INJECTION, EMULSION INTRAVENOUS at 14:34

## 2022-03-15 RX ADMIN — Medication 20 MG: at 14:35

## 2022-03-15 RX ADMIN — ONDANSETRON HYDROCHLORIDE 4 MG: 2 INJECTION, SOLUTION INTRAVENOUS at 14:46

## 2022-03-15 RX ADMIN — SODIUM CHLORIDE, POTASSIUM CHLORIDE, SODIUM LACTATE AND CALCIUM CHLORIDE 1000 ML: 600; 310; 30; 20 INJECTION, SOLUTION INTRAVENOUS at 13:47

## 2022-03-15 RX ADMIN — MIDAZOLAM 1 MG: 1 INJECTION INTRAMUSCULAR; INTRAVENOUS at 14:28

## 2022-03-15 RX ADMIN — GLYCOPYRROLATE 0.1 MG: 0.2 INJECTION, SOLUTION INTRAMUSCULAR; INTRAVENOUS at 14:36

## 2022-03-15 ASSESSMENT — ENCOUNTER SYMPTOMS: DYSRHYTHMIAS: 1

## 2022-03-15 NOTE — BRIEF OP NOTE
[facility]    Brief Operative Note    Pre-operative diagnosis: Anal fistula [K60.3]  Post-operative diagnosis left transsphncteric fistula    Procedure: Procedure(s):  EXAM UNDER ANESTHESIA, PERIANAL BIOPSY  PLACEMENT OF SETON  Surgeon: Surgeon(s) and Role:     * Bonnie Rodriguez MD - Primary  Anesthesia: Monitor Anesthesia Care   Estimated Blood Loss: 1 mL from 3/15/2022  2:30 PM to 3/15/2022  3:01 PM      Drains: seton  Specimens:   ID Type Source Tests Collected by Time Destination   1 : PERIANAL SKIN LESION Tissue Rectum SURGICAL PATHOLOGY EXAM Bonnie Rodriguez MD 3/15/2022  2:48 PM      Findings:   Left transsphincteric fistula involving 50% of the sphincter. Nodularity at the exteranl opening tht was biopsied.   Complications: None.  Implants: * No implants in log *

## 2022-03-15 NOTE — ANESTHESIA POSTPROCEDURE EVALUATION
Patient: Shaina Calixto    Procedure: Procedure(s):  EXAM UNDER ANESTHESIA, PERIANAL BIOPSY  PLACEMENT OF SETON       Anesthesia Type:  MAC    Note:     Postop Pain Control: Uneventful            Sign Out: Well controlled pain   PONV: No   Neuro/Psych: Uneventful            Sign Out: Acceptable/Baseline neuro status   Airway/Respiratory: Uneventful            Sign Out: Acceptable/Baseline resp. status   CV/Hemodynamics: Uneventful            Sign Out: Acceptable CV status; No obvious hypovolemia; No obvious fluid overload   Other NRE: NONE   DID A NON-ROUTINE EVENT OCCUR? No           Last vitals:  Vitals Value Taken Time   BP     Temp     Pulse     Resp     SpO2         Electronically Signed By: Franky Pepper DO  March 15, 2022  3:08 PM

## 2022-03-15 NOTE — OR NURSING
"Patient refused Tylenol dose stating \"I don't take tylenol or Ibuprofin\". Will continue to monitor.  "

## 2022-03-15 NOTE — DISCHARGE INSTRUCTIONS
[facility] ADULT DISCHARGE INSTRUCTIONS   SPECIAL PRECAUTIONS FOR 24 HOURS AFTER SURGERY    IT IS NOT UNUSUAL TO FEEL LIGHT-HEADED OR FAINT, UP TO 24 HOURS AFTER SURGERY OR WHILE TAKING PAIN MEDICATION.  IF YOU HAVE THESE SYMPTOMS; SIT FOR A FEW MINUTES BEFORE STANDING AND HAVE SOMEONE ASSIST YOU WHEN YOU GET UP TO WALK OR USE THE BATHROOM.    YOU SHOULD REST AND RELAX FOR THE NEXT 24 HOURS AND YOU MUST MAKE ARRANGEMENTS TO HAVE SOMEONE STAY WITH YOU FOR AT LEAST 24 HOURS AFTER YOUR DISCHARGE.  AVOID HAZARDOUS AND STRENUOUS ACTIVITIES.  DO NOT MAKE IMPORTANT DECISIONS FOR 24 HOURS.    DO NOT DRIVE ANY VEHICLE OR OPERATE MECHANICAL EQUIPMENT FOR 24 HOURS FOLLOWING THE END OF YOUR SURGERY.  EVEN THOUGH YOU MAY FEEL NORMAL, YOUR REACTIONS MAY BE AFFECTED BY THE MEDICATION YOU HAVE RECEIVED.    DO NOT DRINK ALCOHOLIC BEVERAGES FOR 24 HOURS FOLLOWING YOUR SURGERY.    DRINK CLEAR LIQUIDS (APPLE JUICE, GINGER ALE, 7-UP, BROTH, ETC.).  PROGRESS TO YOUR REGULAR DIET AS YOU FEEL ABLE.    YOU MAY HAVE A DRY MOUTH, A SORE THROAT, MUSCLES ACHES OR TROUBLE SLEEPING.  THESE SHOULD GO AWAY AFTER 24 HOURS.    CALL YOUR DOCTOR FOR ANY OF THE FOLLOWING:  SIGNS OF INFECTION (FEVER, GROWING TENDERNESS AT THE SURGERY SITE, A LARGE AMOUNT OF DRAINAGE OR BLEEDING, SEVERE PAIN, FOUL-SMELLING DRAINAGE, REDNESS OR SWELLING.    IT HAS BEEN OVER 8 TO 10 HOURS SINCE SURGERY AND YOU ARE STILL NOT ABLE TO URINATE (PASS WATER).

## 2022-03-15 NOTE — ANESTHESIA PREPROCEDURE EVALUATION
Anesthesia Pre-Procedure Evaluation    Patient: Shaina Calixto   MRN: [id number] : 1945        Procedure : Procedure(s):  EXAM UNDER ANESTHESIA, PERIANAL BIOPSY  PLACEMENT OF SETON          Past Medical History:   Diagnosis Date     Ankylosing spondylitis (H)      HLAB19 +     Atrial fibrillation, persistent (H) 10/15/2021     Complication of anesthesia      Depressive disorder college, young adult    resolved for many years     Eosinophilic cellulitis      Mild major depression (H)      Osteopenia     -2.1; Lumbar -2.5; Fem Neck      Sleep apnea     Has a Mouth comliance     Small intestinal bacterial overgrowth 2017    has had for years but finally diagnosed correctly May 2017     Vitamin deficiency       Past Surgical History:   Procedure Laterality Date     ARTHROSCOPY KNEE RT/LT Right      BIOPSY  Dec. 2016    spongiotic dermatitis     CARDIAC SURGERY  2022    cardioversion      COLONOSCOPY  May 2015    normal     FOOT SURGERY Right     due to foot injury     FOOT SURGERY Right     ayers's neuroma     LASER YAG CAPSULOTOMY Bilateral 12/29/15     PHACOEMULSIFICATION CLEAR CORNEA WITH STANDARD INTRAOCULAR LENS IMPLANT Right 10/1/14     PHACOEMULSIFICATION CLEAR CORNEA WITH STANDARD INTRAOCULAR LENS IMPLANT Left 10/08/14     SOFT TISSUE SURGERY      multiple crush & fx's to right foot      Allergies   Allergen Reactions     Floraquin [Iodoquinol] Anaphylaxis     LOST SENSE OF SMELL AND TASTE     Cephalosporins      Quinolones      Other reaction(s): Other - Describe In Comment Field  Lost sense of smell.  Pt would prefer to not take any of the medications in Fluoroquninolone group of antibiotics.     Cephalexin Rash     Cortisone Rash     Keflex [Cephalexin Monohydrate] Rash     Penicillins Rash     Triamcinolone Rash     Pt reports rash starting on face then spreading to arms, legs, and truck a few days after a knee injection      Social History     Tobacco Use     Smoking  status: Never Smoker     Smokeless tobacco: Never Used   Substance Use Topics     Alcohol use: Yes     Comment: once every several months      Wt Readings from Last 1 Encounters:   03/15/22 59.4 kg (131 lb)        Anesthesia Evaluation            ROS/MED HX  ENT/Pulmonary:     (+) sleep apnea, uses CPAP,     Neurologic:  - neg neurologic ROS     Cardiovascular:     (+) -----Taking blood thinners dysrhythmias, a-fib,     METS/Exercise Tolerance:     Hematologic:  - neg hematologic  ROS     Musculoskeletal: Comment: Raynaud's      GI/Hepatic:  - neg GI/hepatic ROS     Renal/Genitourinary:     (+) renal disease, type: CRI,     Endo:  - neg endo ROS     Psychiatric/Substance Use:  - neg psychiatric ROS     Infectious Disease:  - neg infectious disease ROS     Malignancy:  - neg malignancy ROS     Other:  - neg other ROS          Physical Exam    Airway        Mallampati: II    Neck ROM: full     Respiratory Devices and Support         Dental           Cardiovascular   cardiovascular exam normal       Rhythm and rate: regular     Pulmonary   pulmonary exam normal                OUTSIDE LABS:  CBC:   Lab Results   Component Value Date    WBC 5.3 04/23/2021    WBC 4.5 02/06/2020    HGB 14.8 03/10/2022    HGB 15.4 04/23/2021    HCT 43.8 04/23/2021    HCT 42.2 02/06/2020     04/23/2021     02/06/2020     BMP:   Lab Results   Component Value Date     03/11/2022     03/10/2022    POTASSIUM 3.9 03/11/2022    POTASSIUM 6.3 (HH) 03/10/2022    CHLORIDE 105 03/11/2022    CHLORIDE 106 03/10/2022    CO2 29 03/11/2022    CO2 27 03/10/2022    BUN 19 03/11/2022    BUN 31 (H) 03/10/2022    CR 1.02 03/11/2022    CR 0.94 03/10/2022    GLC 75 03/11/2022    GLC 99 03/10/2022     COAGS: No results found for: PTT, INR, FIBR  POC: No results found for: BGM, HCG, HCGS  HEPATIC:   Lab Results   Component Value Date    ALBUMIN 3.4 03/10/2022    PROTTOTAL 6.8 03/10/2022    ALT 26 03/10/2022    AST 17 03/10/2022    ALKPHOS  93 03/10/2022    BILITOTAL 0.4 03/10/2022     OTHER:   Lab Results   Component Value Date    A1C 5.5 11/19/2020    TOM 8.8 03/11/2022    MAG 2.2 02/06/2020    TSH 1.44 11/19/2020    T4 0.95 11/19/2020    CRP <2.9 11/19/2020       Anesthesia Plan    ASA Status:  3      Anesthesia Type: MAC.              Consents            Postoperative Care    Pain management: IV analgesics, Multi-modal analgesia, Oral pain medications.   PONV prophylaxis: Ondansetron (or other 5HT-3)     Comments:                Franky Pepper DO

## 2022-03-15 NOTE — OP NOTE
Procedure Date: 03/15/2022    PREOPERATIVE DIAGNOSIS:  Left transsphincteric fistula.    POSTOPERATIVE DIAGNOSIS:  Left transsphincteric fistula.    PROCEDURE:  Exam under anesthesia, biopsy of external lesion and placement of seton.    SURGEON:  Bonnie Rodriguez MD    ASSISTANT:  None.    ANESTHESIA:  Monitored anesthesia care plus 10 mL of lidocaine and 30 mL of 0.25% Marcaine with 1:200,000 epinephrine.    INDICATIONS FOR PROCEDURE:  Shaina is a 76-year-old woman who has had intermittent pain, pressure and drainage of the left perianal area.  She underwent an ultrasound back in 05/2021, where there was noted to be a transsphincteric fistula encompassing a good percentage of the sphincter complex.  She was scheduled for procedure, however was found to have atrial fibrillation and had been anticoagulated.  She recently passed her cardiac clearance and wishes to proceed with the exam under anesthesia and seton placement.  We reviewed the risks, benefits and alternatives.  She understood and wished to proceed.    FINDINGS:  There was an external opening with a nodular irregularity.  There was a small amount of purulence within the tract.  There was a well-developed tract between this external opening and the mid portion of the left anterior anal canal.  There was considerable scarring in this area within the anal canal.  No obvious undrained sepsis or additional abnormalities.    DESCRIPTION OF PROCEDURE:  After informed consent was obtained, the patient was taken to the operating room, positioned on the operating table in the prone jackknife position, and sedated.  Perianal region was taped, prepped and draped in the usual fashion.  After appropriate timeout, we began by injecting 10 mL of lidocaine with sodium bicarbonate, followed by 30 mL of 0.25% Marcaine with 1:200,000 epinephrine.  External inspection revealed some prominent skin tags and external opening in the left lateral aspect.  Gentle pressure resulted in  purulent drainage.  Digital rectal exam revealed scarring within the anal canal consistent with her prior hemorrhoidal procedures.  No other abnormalities were noted.  Roque bivalve was inserted and revealed some scarring within the left anterior anal canal without obvious other lesion.  There was significant hypertrophied anal papilla, but no other concerning findings.  A fistula probe was guided through the external opening and easily dropped into the anal canal.  The tract was flushed with hydrogen peroxide.  Gentle probing did not reveal any high blind tract or undrained sepsis.  A silk suture was drawn back through.  The external opening was enlarged, and this nodular lesion was removed with electrocautery and sent for pathology.  The tract was curetted free of debris.  A red small vessel loop was drawn through and secured to itself in 4 spaces with silk suture.  Assessing for hemostasis, it was excellent, and no other lesions were noted.  Dry gauze dressing was placed externally.  She was returned to the [facility] and taken to recovery in stable condition.    COUNTS:  Sponge and needle counts were correct at the end of the procedure.    ESTIMATED BLOOD LOSS:  Nil.    Bonnie Rodriguez MD        D: 03/15/2022   T: 03/15/2022   MT: [facility]    Name:     KATIE MARIE  MRN:      [id number]        Account:        [id number]   :      1945           Procedure Date: 03/15/2022     Document: [id number]    cc:  Bonnie Rodriguez MD

## 2022-03-17 LAB
PATH REPORT.COMMENTS IMP SPEC: NORMAL
PATH REPORT.COMMENTS IMP SPEC: NORMAL
PATH REPORT.FINAL DX SPEC: NORMAL
PATH REPORT.GROSS SPEC: NORMAL
PATH REPORT.MICROSCOPIC SPEC OTHER STN: NORMAL
PATH REPORT.RELEVANT HX SPEC: NORMAL
PHOTO IMAGE: NORMAL

## 2022-03-17 PROCEDURE — 88305 TISSUE EXAM BY PATHOLOGIST: CPT | Mod: 26 | Performed by: PATHOLOGY

## 2022-04-01 ENCOUNTER — TRANSFERRED RECORDS (OUTPATIENT)
Dept: HEALTH INFORMATION MANAGEMENT | Facility: CLINIC | Age: 77
End: 2022-04-01
Payer: COMMERCIAL

## 2022-04-22 ENCOUNTER — OFFICE VISIT (OUTPATIENT)
Dept: FAMILY MEDICINE | Facility: CLINIC | Age: 77
End: 2022-04-22
Attending: NURSE PRACTITIONER
Payer: COMMERCIAL

## 2022-04-22 VITALS — SYSTOLIC BLOOD PRESSURE: 108 MMHG | DIASTOLIC BLOOD PRESSURE: 68 MMHG

## 2022-04-22 DIAGNOSIS — R23.9 SKIN CHANGE: ICD-10-CM

## 2022-04-22 DIAGNOSIS — L81.4 LENTIGINES: ICD-10-CM

## 2022-04-22 DIAGNOSIS — H61.009: ICD-10-CM

## 2022-04-22 DIAGNOSIS — L82.1 SEBORRHEIC KERATOSIS: Primary | ICD-10-CM

## 2022-04-22 DIAGNOSIS — D22.9 MULTIPLE BENIGN NEVI: ICD-10-CM

## 2022-04-22 DIAGNOSIS — D18.01 CHERRY ANGIOMA: ICD-10-CM

## 2022-04-22 PROCEDURE — 99203 OFFICE O/P NEW LOW 30 MIN: CPT | Performed by: DERMATOLOGY

## 2022-04-22 NOTE — PATIENT INSTRUCTIONS
Patient Education     Checking for Skin Cancer  You can find cancer early by checking your skin each month. There are 3 kinds of skin cancer. They are melanoma, basal cell carcinoma, and squamous cell carcinoma. Doing monthly skin checks is the best way to find new marks or skin changes. Follow the instructions below for checking your skin.   The ABCDEs of checking moles for melanoma   Check your moles or growths for signs of melanoma using ABCDE:   Asymmetry: the sides of the mole or growth don t match  Border: the edges are ragged, notched, or blurred  Color: the color within the mole or growth varies  Diameter: the mole or growth is larger than 6 mm (size of a pencil eraser)  Evolving: the size, shape, or color of the mole or growth is changing (evolving is not shown in the images below)    Checking for other types of skin cancer  Basal cell carcinoma or squamous cell carcinoma have symptoms such as:     A spot or mole that looks different from all other marks on your skin  Changes in how an area feels, such as itching, tenderness, or pain  Changes in the skin's surface, such as oozing, bleeding, or scaliness  A sore that does not heal  New swelling or redness beyond the border of a mole    Who s at risk?  Anyone can get skin cancer. But you are at greater risk if you have:   Fair skin, light-colored hair, or light-colored eyes  Many moles or abnormal moles on your skin  A history of sunburns from sunlight or tanning beds  A family history of skin cancer  A history of exposure to radiation or chemicals  A weakened immune system  If you have had skin cancer in the past, you are at risk for recurring skin cancer.   How to check your skin  Do your monthly skin checkups in front of a full-length mirror. Check all parts of your body, including your:   Head (ears, face, neck, and scalp)  Torso (front, back, and sides)  Arms (tops, undersides, upper, and lower armpits)  Hands (palms, backs, and fingers, including  under the nails)  Buttocks and genitals  Legs (front, back, and sides)  Feet (tops, soles, toes, including under the nails, and between toes)  If you have a lot of moles, take digital photos of them each month. Make sure to take photos both up close and from a distance. These can help you see if any moles change over time.   Most skin changes are not cancer. But if you see any changes in your skin, call your doctor right away. Only he or she can diagnose a problem. If you have skin cancer, seeing your doctor can be the first step toward getting the treatment that could save your life.   [employer] last reviewed this educational content on 4/1/2019 2000-2020 [employer]. [address] [place] [address], [place] [address]. All rights reserved. This information is not intended as a substitute for professional medical care. Always follow your healthcare professional's instructions.

## 2022-04-22 NOTE — PROGRESS NOTES
[facility] Dermatology Clinic Note - EP    Encounter Date: Apr 22, 2022    Dermatology Problem List:  #. Lesion to monitor, L clavicle, ddx resolving bruise with underlying lentigo  # Chondrodermatitis nodularis chronica helices     ____________________________________________    Assessment & Plan:     #. Lesion to monitor, L clavicle, ddx resolving bruise with underlying lentigo  Advised to monitor and reach out if not resolved in a month.    # Chondrodermatitis nodularis chronica helicis   Discussed natural etiology and preventative measures. Recommended conservative measures like sleeping on her other side.     #. Multiple benign nevi. Solar lentigines.   - No concerning lesions today  - Monitor for ABCDEs of melanoma   - Continue sun protection - recommend SPF 30 or higher with frequent application   - Return sooner if noticing changing or symptomatic lesions    # Cherry angiomas. Seborrheic keratoses.   Discussed the natural history and benign nature of this lesion. Reassurance provided that no additional treatment is necessary.     Procedures Performed:   None    Follow-up: 1 year    Scribe Disclosure:  I, Leticia Pisano, am serving as a scribe to document services personally performed by Bronson Singh MD based on data collection and the provider's statements to me.     Provider Disclosure:  The documentation recorded by the scribe accurately reflects the services I personally performed and the decisions made by me.    Bronson Singh MD  Dermatology/Dermatopathology Staff Physician  , Department of Dermatology    ____________________________________________    CC: Derm Problem (Lesion on left clavicle)      HPI:  Ms. Shaina Calixto is a(n) extremely pleasant 76 year old female who [place] today [place] [facility]    Today, the patient points to discoloration on her L clavicle that started as a brown color with white discoloration. It has since become green/purple in color. She  is on Eliquis and does notice bruising, however this appeared different. She points to a few other brown lesions on her chest.  No history of skin cancer. Patient is otherwise feeling well, without additional skin concerns.     Labs Reviewed:  N/A    Physical Exam:  Vitals: /68   LMP  (LMP Unknown)   SKIN: Total skin excluding the undergarment areas was performed. The exam included the head/face, neck, both arms, chest, back, abdomen, both legs, digits and/or nails.   - Yellow to green discoloration on the L clavicle. At the periphery, non networked pigmented reminiscent of a lentigo.   - Scaly deep red erythematous papule on L antihelix on protruding portion of the ear.  - There are dome shaped bright red papules on the trunk and extremities.   - Multiple regular brown pigmented macules and papules are identified on the trunk and extremities.   - Scattered brown macules on sun exposed areas.  - There are waxy stuck on tan to brown papules on the trunk and extremities.  - No other lesions of concern on areas examined.     Medications:  Current Outpatient Medications   Medication     UNABLE TO FIND     UNABLE TO FIND     UNABLE TO FIND     UNABLE TO FIND     apixaban ANTICOAGULANT (ELIQUIS) 5 MG tablet     cholecalciferol (VITAMIN D3) 125 MCG (5000 UT) TABS tablet     Menaquinone-7 (VITAMIN K2 PO)     metoprolol succinate ER (TOPROL-XL) 25 MG 24 hr tablet     UNABLE TO FIND     UNABLE TO FIND     UNABLE TO FIND     No current facility-administered medications for this visit.      Past Medical History:   Patient Active Problem List   Diagnosis     Breast cancer screening     Mild major depression (H)     Yeast dermatitis     Stress     Urticaria     CARDIOVASCULAR SCREENING; LDL GOAL LESS THAN 160     High risk HPV infection     Multiple nevi     Cherry angioma     Screening for skin cancer     Restless legs syndrome (RLS)     Plantar fasciitis     HPV (human papilloma virus) infection     Raynaud's disease  without gangrene     SABRA (obstructive sleep apnea)     Age-related osteoporosis without current pathological fracture     Eczema, unspecified type     Dyspepsia     Near syncope     Atrial fibrillation, persistent (H)     Major depressive disorder, recurrent episode, in full remission (H)     Stage 3 chronic kidney disease, unspecified whether stage 3a or 3b CKD (H)     Past Medical History:   Diagnosis Date     Ankylosing spondylitis (H)      HLAB19 +     Atrial fibrillation, persistent (H) 10/15/2021     Complication of anesthesia      Depressive disorder college, young adult    resolved for many years     Eosinophilic cellulitis      Mild major depression (H)      Osteopenia     -2.1; Lumbar -2.5; Fem Neck      Sleep apnea     Has a Mouth comliance     Small intestinal bacterial overgrowth 05/2017    has had for years but finally diagnosed correctly May 2017     Vitamin deficiency        CC Ami Aguilar, APRN CNP  [address] [facility] [place] [address] on close of this encounter.    This note has been created using voice recognition software; while it has been reviewed, some errors may persist.

## 2022-04-22 NOTE — LETTER
4/22/2022         RE: Shaina Calixto  [address] [place] [id number]        Dear Colleague,    Thank you for referring your patient, Shaina Calixto, to the [facility]. Please see a copy of my visit note below.    [facility] Dermatology Clinic Note - EP    Encounter Date: Apr 22, 2022    Dermatology Problem List:  #. Lesion to monitor, L clavicle, ddx resolving bruise with underlying lentigo  # Chondrodermatitis nodularis chronica helices     ____________________________________________    Assessment & Plan:     #. Lesion to monitor, L clavicle, ddx resolving bruise with underlying lentigo  Advised to monitor and reach out if not resolved in a month.    # Chondrodermatitis nodularis chronica helicis   Discussed natural etiology and preventative measures. Recommended conservative measures like sleeping on her other side.     #. Multiple benign nevi. Solar lentigines.   - No concerning lesions today  - Monitor for ABCDEs of melanoma   - Continue sun protection - recommend SPF 30 or higher with frequent application   - Return sooner if noticing changing or symptomatic lesions    # Cherry angiomas. Seborrheic keratoses.   Discussed the natural history and benign nature of this lesion. Reassurance provided that no additional treatment is necessary.     Procedures Performed:   None    Follow-up: 1 year    Scribe Disclosure:  I, Leticia Pisano, am serving as a scribe to document services personally performed by Bronson Singh MD based on data collection and the provider's statements to me.     Provider Disclosure:  The documentation recorded by the scribe accurately reflects the services I personally performed and the decisions made by me.    Bronson Singh MD  Dermatology/Dermatopathology Staff Physician  , Department of Dermatology    ____________________________________________    CC: Derm Problem (Lesion on left clavicle)      HPI:  Ms. Shaina Calixto is  a(n) extremely pleasant 76 year old female who presents today as a new patient for [facility].    Today, the patient points to discoloration on her L clavicle that started as a brown color with white discoloration. It has since become green/purple in color. She is on Eliquis and does notice bruising, however this appeared different. She points to a few other brown lesions on her chest.  No history of skin cancer. Patient is otherwise feeling well, without additional skin concerns.     Labs Reviewed:  N/A    Physical Exam:  Vitals: /68   LMP  (LMP Unknown)   SKIN: Total skin excluding the undergarment areas was performed. The exam included the head/face, neck, both arms, chest, back, abdomen, both legs, digits and/or nails.   - Yellow to green discoloration on the L clavicle. At the periphery, non networked pigmented reminiscent of a lentigo.   - Scaly deep red erythematous papule on L antihelix on protruding portion of the ear.  - There are dome shaped bright red papules on the trunk and extremities.   - Multiple regular brown pigmented macules and papules are identified on the trunk and extremities.   - Scattered brown macules on sun exposed areas.  - There are waxy stuck on tan to brown papules on the trunk and extremities.  - No other lesions of concern on areas examined.     Medications:  Current Outpatient Medications   Medication     UNABLE TO FIND     UNABLE TO FIND     UNABLE TO FIND     UNABLE TO FIND     apixaban ANTICOAGULANT (ELIQUIS) 5 MG tablet     cholecalciferol (VITAMIN D3) 125 MCG (5000 UT) TABS tablet     Menaquinone-7 (VITAMIN K2 PO)     metoprolol succinate ER (TOPROL-XL) 25 MG 24 hr tablet     UNABLE TO FIND     UNABLE TO FIND     UNABLE TO FIND     No current facility-administered medications for this visit.      Past Medical History:   Patient Active Problem List   Diagnosis     Breast cancer screening     Mild major depression (H)     Yeast dermatitis     Stress     Urticaria      CARDIOVASCULAR SCREENING; LDL GOAL LESS THAN 160     High risk HPV infection     Multiple nevi     Cherry angioma     Screening for skin cancer     Restless legs syndrome (RLS)     Plantar fasciitis     HPV (human papilloma virus) infection     Raynaud's disease without gangrene     SABRA (obstructive sleep apnea)     Age-related osteoporosis without current pathological fracture     Eczema, unspecified type     Dyspepsia     Near syncope     Atrial fibrillation, persistent (H)     Major depressive disorder, recurrent episode, in full remission (H)     Stage 3 chronic kidney disease, unspecified whether stage 3a or 3b CKD (H)     Past Medical History:   Diagnosis Date     Ankylosing spondylitis (H)      HLAB19 +     Atrial fibrillation, persistent (H) 10/15/2021     Complication of anesthesia      Depressive disorder college, young adult    resolved for many years     Eosinophilic cellulitis      Mild major depression (H)      Osteopenia     -2.1; Lumbar -2.5; Fem Neck      Sleep apnea     Has a Mouth comliance     Small intestinal bacterial overgrowth 05/2017    has had for years but finally diagnosed correctly May 2017     Vitamin deficiency        CC Ami Aguilar, APRN CNP  [address] [facility] [place] [address] on close of this encounter.    This note has been created using voice recognition software; while it has been reviewed, some errors may persist.         Again, thank you for allowing me to participate in the care of your patient.        Sincerely,        Bronson Singh MD

## 2022-08-11 ENCOUNTER — TELEPHONE (OUTPATIENT)
Dept: FAMILY MEDICINE | Facility: CLINIC | Age: 77
End: 2022-08-11

## 2022-08-11 ENCOUNTER — THERAPY VISIT (OUTPATIENT)
Dept: PHYSICAL THERAPY | Facility: CLINIC | Age: 77
End: 2022-08-11
Payer: COMMERCIAL

## 2022-08-11 DIAGNOSIS — M62.89 PELVIC FLOOR DYSFUNCTION IN FEMALE: Primary | ICD-10-CM

## 2022-08-11 DIAGNOSIS — M62.89 HIGH-TONE PELVIC FLOOR DYSFUNCTION: Primary | ICD-10-CM

## 2022-08-11 PROCEDURE — 97162 PT EVAL MOD COMPLEX 30 MIN: CPT | Mod: GP | Performed by: PHYSICAL THERAPIST

## 2022-08-11 PROCEDURE — 97140 MANUAL THERAPY 1/> REGIONS: CPT | Mod: GP | Performed by: PHYSICAL THERAPIST

## 2022-08-11 PROCEDURE — 97110 THERAPEUTIC EXERCISES: CPT | Mod: GP | Performed by: PHYSICAL THERAPIST

## 2022-08-11 PROCEDURE — 97530 THERAPEUTIC ACTIVITIES: CPT | Mod: GP | Performed by: PHYSICAL THERAPIST

## 2022-08-11 NOTE — PROGRESS NOTES
"Physical Therapy Initial Evaluation  Subjective:  SUBJECTIVE:  Patient reports onset of pelvic floor symptoms throughout her life, referred to PT 08/11/22. Prior hx of pelvic floor PT since and full workups at the pelvic floor center/[facility] over the past 3 years or so, showing high tone pelvic floor and incomplete bowel emptying. Symptoms include variable bowel movements: hard to loose stool/constipation, hasn't had \"normal\" BM for about a year,unable to achieve orgasm.  Since onset symptoms have been getting better, worse or staying the same? Same. Working with natural medicine and acupuncture. 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 Waiting on surgery for this until after improves bowel evacuation. Has 2-3 BM's within first hour of day, and 5-8 x day total.  Uses squatty potty. Retired OT in peds. On Tuesday will be seeing [facility] for a an abdominal mass (not sure where exactly), possible biopsy. Goal is to reduce hypertonicity in pelvic floor to evacuate bowels normally and have orgasm.    Urination:  Do you leak on the way to the bathroom or with a strong urge to void? Yes , sometimes and very little, not really concerning to her  Do you leak with cough,sneeze, jumping, running?No   Any other activities that cause leaking? No   Do you have triggers that make you feel you can't wait to go to the bathroom? No   what are theyNA.  Type of pad and number used per day? 0  When you leak what is the amount? 0    How long can you delay the need to urinate? d.   How many times do you get up to urinate at night? 1-3    Can you stop the flow of urine when on the toilet? Yes  Is the volume of urine passed usually: small to medium. (8sec rule=  250ml with average bladder storing  400-600ml)    Do you strain to pass urine? No  Do you have a slow or hesitant urinary stream? No  Do you have difficulty initiating the urine stream? No  Is urination painful?  No    How many " bladder infections have you had in last 12 months?0    Fluid intake(one glass is 8oz or one cup) 65 oz glasses/day, 8 oz caffinated glasses/day  0 alcohol glasses/day.    Bowel habits:  Frequency of bowel movements? 5-8 times a day, manual evacuation daily   Consistancy of stool? various, [place] Stool Scale NA  Do you ignore the urge to defecate? No  Do you strain to pass stool? Yes  Herbal tea and IGG from acupuncture    Pelvic Pain:  Do you have any pelvic pain with intercourse, exams, use of tampons? No  Is initial penetration during intercourse painful? Not asked  Is deeper penetration painful? Not asked  Do you use lubricant?   Not asked What kind? NA      Given birth? No   Are you sexually active?No  Have you ever been worried for your physical safety? Yes   Any abdominal or pelvic surgeries? No but awaiting fistula repair  Are you having any regular exercise?yoga, walks dog, care for large home, deep muscle relaxation  Have you practiced the PF(kegel) exercises for 4 or more weeks?no        The history is provided by the patient.   Patient Health History  Shaina Calixto being seen for pelvic floor dysfunction.       Problem occurred: have always had tendency towards hypertonicity in pelvic floor muscles   Pain is reported as 3/10 on pain scale.  General health as reported by patient is good.  Pertinent medical history includes: anemia, changes in bowel/bladder, chest pain, depression, history of fractures, heart problems, kidney disease, menopausal, numbness/tingling, osteoporosis, sleep disorder/apnea, unexplained weight loss and implanted device.     Medical allergies: other. Other medical allergies details: penicillan, keflex, the fluroquinolones, cortisone.   Surgeries include:  Orthopedic surgery.    Current medications:  Cardiac medication and hormone replacement therapy.    Current occupation is retired Pediatric Occupational Therapist.   Primary job tasks include:  Computer work, lifting/carrying  and prolonged sitting.                                    Objective:  System                                 Pelvic Dysfunction Evaluation:    Bladder/Pelvic Problems:    Storage Problem:  Urgency  Emptying Problem:  Incomplete emptying      Diagnostic Tests:  Diagnostic tests pelvic: full workup at pelvic floor center and [facility].  Pelvic Exam:  Yes                      Flexibility:    Tightness present at:Adductors; Iliopsoas; Hamstrings; Piriformis and Gluteals    Abdominal Wall:  normal        Pelvic Clock Exam:  Pelvic clock exam: Extreme tightness/tension noted throughout all 3 layers of pelvic floor. Minimal tenderness, high tone pelvic floor throughout.  Ischiocavernosis pain:  +  Bulbocavernosis pain:  +++  Transverse Perineal:  +  Levator ANI:  +++  Perineal Body:  -    Reflex Testing:  normal    External Assessment:  External assessment pelvic: patient keeps tissue near anal opening to absorb slight leakage from seton/fistula.  Skin Condition:  Normal    Bearing Down/Coughing:  Normal  Tissue Symmetry:  Normal  Introitus:  Normal  Muscle Contraction/Perineal Mobility:  Elevation and urogential triangle descent  Internal Assessment:  Internal assessment pelvic: 3-4 second delay with relaxation after kegel.    Contraction/Grade:  Good squeeze, good hold with lift, repeatable (4)          SEMG Biofeedback:  NA                  Additional History:    Number of Pregnancies: 0  Number of Live Births: 0  Caffeine Consumption:  1 8oz                     General     ROS    Assessment/Plan:    Patient is a 76 year old female with pelvic complaints.    Patient has the following significant findings with corresponding treatment plan.                Diagnosis 1:  Pelvic floor dysfunction/incomplete bowel evacuation  Decreased ROM/flexibility - manual therapy and therapeutic exercise  Decreased proprioception - neuro re-education and therapeutic activities  Inflammation - self management/home program  Impaired muscle  performance - biofeedback and neuro re-education  Decreased function - therapeutic activities    Therapy Evaluation Codes:   1) History comprised of:   Personal factors that impact the plan of care:      Time since onset of symptoms.    Comorbidity factors that impact the plan of care are:      Bowel/bladder changes, Depression, Heart problems, Implanted device and kidney disease.     Medications impacting care: Cardiac and hormone replacement (topical estrogen on wrist).  2) Examination of Body Systems comprised of:   Body structures and functions that impact the plan of care:      Pelvis.   Activity limitations that impact the plan of care are:      Pelvic Exam, Urgency, Urge incontinence and incomplete bowel evacuation.  3) Clinical presentation characteristics are:   Evolving/Changing.  4) Decision-Making    Moderate complexity using standardized patient assessment instrument and/or measureable assessment of functional outcome.  Cumulative Therapy Evaluation is: Moderate complexity.    Previous and current functional limitations:  (See Goal Flow Sheet for this information)    Short term and Long term goals: (See Goal Flow Sheet for this information)     Communication ability:  Patient appears to be able to clearly communicate and understand verbal and written communication and follow directions correctly.  Treatment Explanation - The following has been discussed with the patient:   RX ordered/plan of care  Anticipated outcomes  Possible risks and side effects  This patient would benefit from PT intervention to resume normal activities.   Rehab potential is good.    Frequency:  1 X week, once daily  Duration:  for 6 weeks tapering to 2 X a month over 8 weeks  Discharge Plan:  Achieve all LTG.  Independent in home treatment program.  Reach maximal therapeutic benefit.    Please refer to the daily flowsheet for treatment today, total treatment time and time spent performing 1:1 timed codes.

## 2022-08-11 NOTE — TELEPHONE ENCOUNTER
Subjective     REASON FOR FOLLOW UP:  1.  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   2.  Combined radiation and low-dose carboplatin and Taxol chemotherapy weekly initiated 6/17/2019.  3.  MediPort placed by Dr. Adolph Grigsby of vascular surgery 6/18/2019  4.  Methicillin sensitive staph septicemia felt to be associated with infected MediPort.  Port was removed and patient has completed a protracted course of antibiotics  5.  Generalized weakness and malnutrition  6.  Atrial fibrillation.  He is currently anticoagulated with Coumadin  7.  Hypothyroidism on levothyroxine replacement.    HISTORY OF PRESENT ILLNESS:  The patient is a 78 y.o. year old male who was previously treated with weekly carboplatin and Taxol along with radiation for his squamous cell carcinoma of the base of the tongue, HPV positive.      He was started on combined radiation and low-dose weekly carboplatin and Taxol chemotherapy but had tremendous problems with toxicity and tolerance to treatment.  He received his fifth week of chemotherapy and radiation on 7/31/2019 but subsequently required admission to the hospital with severe toxicity and development of methicillin sensitive staph septicemia.  His MediPort was felt to be infected and was removed.    After his recovery he opted not to finish out his remaining days of radiation and is being followed expectantly.  He underwent recent ENT evaluation with Dr. Slater and was felt to be doing well.     Surveillance CT scans performed 7/27/2021 as noted below showed a somewhat vague area of enhancement in the left lobe of the liver of uncertain significance.  No other definite evidence of metastatic disease identified.  He reports that he had a good checkup with his ENT physician Dr. Slater recently.    He reports that he is feeling well and other than being a little tired has no new complaints.  His TSH from last week was within normal limits at his current dose of 125 mcg  Pt calling to report she was seen in [facility] for a pelvic floor dysfunction. Pt reports was recommended to be seen through the pelvic floor therapy. Pt reports she does not want to go to [place] to get this service. Pt scheduled a visit within [facility] but is now told she needs a referral. Pt noted has appt today.   Future Appointments   Date Time Provider Department Center   8/11/2022 10:40 AM Coral Bowman, PT [facility]   8/16/2022 11:45 AM Coral Bowman, PT [facility]   9/1/2022 10:40 AM Coral Bowman, PT [facility]   9/8/2022 11:20 AM Coral Bowman, PT [facility]   9/15/2022 10:40 AM Coral Bowman, PT [facility]   9/22/2022 10:40 AM Coral Bowman, PT [facility]     Advised will pend referral for review and advise or sign if agree.    Adolph SMALL RN   [facility] Triage     of levothyroxine daily.    He remains on Coumadin which is managed by his cardiology office.  He denies any visible bleeding.    History of Present Illness     Past Medical History:   Diagnosis Date   • Acquired hypothyroidism    • Acute renal injury (CMS/HCC)    • Anemia associated with chemotherapy    • Atrial fibrillation (CMS/HCC)    • CAD (coronary artery disease)    • CHF (congestive heart failure) (CMS/HCC)    • Chronic bronchitis (CMS/HCC)    • COPD (chronic obstructive pulmonary disease) (CMS/HCC)    • Cor pulmonale (chronic) (CMS/HCC)    • Daytime hypersomnia    • Depression with anxiety    • IRAHETA (dyspnea on exertion)    • Enlarged prostate    • Frequent nocturnal awakening    • Gout    • H/O cardiac radiofrequency ablation 2006    [facility].   • Head and neck cancer (CMS/HCC)    • Hypertension    • Hypotension    • Insomnia    • Lumbar radicular pain    • SHAR (obstructive sleep apnea)    • Osteoarthritis    • Permanent atrial fibrillation (CMS/HCC)    • Shock, septic (CMS/HCC)    • Snoring    • Squamous cell carcinoma of neck    • SVT (supraventricular tachycardia) (CMS/HCC)    • Syncope    • Vitamin D deficiency    • Weight loss         Past Surgical History:   Procedure Laterality Date   • APPENDECTOMY     • CARDIAC ABLATION  2006    [facility].   • CARDIAC CATHETERIZATION N/A 8/29/2018    Procedure: Left Heart Cath;  Surgeon: Yousif Uribe MD;  Location:  [facility];  Service: Cardiology   • CARDIAC CATHETERIZATION N/A 8/29/2018    Procedure: Coronary angiography;  Surgeon: Yousif Uribe MD;  Location:  [facility];  Service: Cardiology   • CARDIAC CATHETERIZATION N/A 8/29/2018    Procedure: Left ventriculography;  Surgeon: Yousif Uribe MD;  Location:  [facility];  Service: Cardiology   • FINGER SURGERY     • FOOT SURGERY     • HAND SURGERY     • VENOUS ACCESS DEVICE (PORT) INSERTION Right 6/18/2019    Procedure: MEDIPORT PLACEMENT;   "Surgeon: Elvis Grigsby MD;  Location: [facility];  Service: Vascular   • VENOUS ACCESS DEVICE (PORT) REMOVAL Right 8/5/2019    Procedure: REMOVAL VENOUS ACCESS DEVICE;  Surgeon: Prince Castaneda MD;  Location: [facility];  Service: Vascular        ALLERGIES:    Allergies   Allergen Reactions   • Benadryl [Diphenhydramine Hcl] Dizziness     \"I sleep for about a day\"        Review of Systems   Constitutional: Positive for fatigue. Negative for activity change, chills and fever.   HENT: Negative for trouble swallowing and voice change.    Eyes: Negative for pain and visual disturbance.   Respiratory: Negative for cough, shortness of breath and wheezing.    Cardiovascular: Negative for chest pain, palpitations and leg swelling.   Gastrointestinal: Negative for abdominal pain, constipation, diarrhea and vomiting.   Genitourinary: Negative for difficulty urinating, frequency and urgency.   Musculoskeletal: Negative for arthralgias and joint swelling.   Skin: Negative for rash.   Neurological: Negative for seizures.   Hematological: Negative for adenopathy. Bruises/bleeds easily.   Psychiatric/Behavioral: Negative for behavioral problems and confusion. The patient is not nervous/anxious.         Objective     Vitals:    08/03/21 1330   BP: 131/73   Pulse: 65   Resp: 18   Temp: 97.3 °F (36.3 °C)   TempSrc: Temporal   SpO2: 96%   Weight: 73.2 kg (161 lb 7.2 oz)   Height: 177.8 cm (70\")   PainSc:   6   PainLoc: Back     Current Status 8/3/2021   ECOG score 0       Physical Exam   Constitutional: He is oriented to person, place, and time. He appears well-developed. No distress.   HENT:   Head: Normocephalic.   Mouth/Throat: No oropharyngeal exudate.   Eyes: Pupils are equal, round, and reactive to light. Conjunctivae are normal. No scleral icterus.   Neck: No JVD present. No thyromegaly present.   Cardiovascular: Normal rate. An irregularly irregular rhythm present. Exam reveals no gallop and no friction rub. "   No murmur heard.  Pulmonary/Chest: Effort normal and breath sounds normal. He has no wheezes. He has no rales.   Abdominal: Soft. He exhibits no distension and no mass. There is no abdominal tenderness.   Musculoskeletal: Normal range of motion. No deformity.      Comments: trace ankle edema bilaterally   Lymphadenopathy:     He has no cervical adenopathy.   Neurological: He is alert and oriented to person, place, and time. He has normal reflexes. No cranial nerve deficit.   Skin: Skin is warm and dry. Bruising noted. No rash noted. No erythema.   Senile purpura on the forearms and dorsal surfaces of the hands.   Psychiatric: His behavior is normal. Judgment normal.         RECENT LABS:  Hematology WBC   Date Value Ref Range Status   07/27/2021 4.75 3.40 - 10.80 10*3/mm3 Final     RBC   Date Value Ref Range Status   07/27/2021 3.89 (L) 4.14 - 5.80 10*6/mm3 Final     Hemoglobin   Date Value Ref Range Status   07/27/2021 11.9 (L) 13.0 - 17.7 g/dL Final     Hematocrit   Date Value Ref Range Status   07/27/2021 36.8 (L) 37.5 - 51.0 % Final     Platelets   Date Value Ref Range Status   07/27/2021 141 140 - 450 10*3/mm3 Final        Lab Results   Component Value Date    NEUTROABS 4.05 08/07/2020     Lab Results   Component Value Date    TSH 2.480 07/27/2021     Lab Results   Component Value Date    IRON 72 07/27/2021    TIBC 247 (L) 07/27/2021    FERRITIN 184.10 07/27/2021         Lab Results   Component Value Date    GLUCOSE 121 (H) 07/27/2021    BUN 26 (H) 07/27/2021    CREATININE 1.20 07/27/2021    EGFRIFNONA 55 (L) 07/27/2021    EGFRIFAFRI 107 04/23/2018    BCR 20.6 07/27/2021    K 4.3 07/27/2021    CO2 25.9 07/27/2021    CALCIUM 9.0 07/27/2021    PROTENTOTREF 7.2 04/23/2018    ALBUMIN 3.80 07/27/2021    LABIL2 1.3 04/23/2018    AST 18 07/27/2021    ALT 10 07/27/2021       CT OF THE CHEST, ABDOMEN AND PELVIS WITH CONTRAST 07/27/2021  IMPRESSION:  1. Two stable tiny right middle lobe nodules unchanged from the  previous  study of 01/12/2021.  2. Small focus of hyperenhancement in the left hepatic lobe is  nonspecific. No other liver lesions are seen. Consider repeat PET/CT  scan or short-term follow-up CT of the abdomen in 3 months to 4 months.  3. Nonobstructing left renal stone and bilateral renal cysts.      Assessment/Plan     1.  Stage I (T2, in 1; HPV positive) squamous cell carcinoma of the base of the tongue with metastatic lymphadenopathy in the right neck.  He was undergoing definitive treatment with radiation therapy and weekly low-dose carboplatin and Taxol chemotherapy.  As noted above, he was unable to tolerate the full treatment and received about 5 weeks of therapy with his last treatment the week of 7/31/2019.  Posttreatment PET scan from 1/14/2020 showed complete metabolic response.  2.  Comorbidities including ischemic heart disease with history of CHF and atrial fibrillation requiring Coumadin anticoagulation.   3.  Extremely poor tolerance to chemotherapy and radiation.  Treatment was stopped somewhat prematurely due to the patient's extremely poor tolerance.  4.  Warfarin anticoagulation.     5.  MediPort infection with methicillin sensitive staph septicemia.  This resulted in a lengthy hospitalization and stay in the critical care unit.  He now seems to be recovered and is completed his antibiotic therapy as an outpatient.  His port was removed.  6.  Hypothyroidism which developed following his radiation treatment.  He currently is on thyroid replacement with Synthroid 125 mcg daily.   7.  Mild chronic anemia which is stable and likely multifactorial.  There is no evidence of iron deficiency on his recent labs from 7/27/2021  8.  Vague hyperenhancing lesion in the left lobe of the liver on CT scan from 7/27/2021.  Liver enzymes are normal and patient is feeling fine.  We will plan to repeat his imaging a little sooner with a 3-month PET scan if possible.    Plan  1.  We discussed the results of the  CT scans and labs with the patient at length in the office today and provided him a printed copy of the reports.  2.  His cardiology office will continue to manage his Coumadin anticoagulation for now.  3.  He will follow-up with Dr. Slater for further ENT exams.  4.  His thyroid replacement is being managed by his primary care physician Dr. Patrick Diaz.  His TSH last week was normal on his current dose of 125 mcg daily  5.  We will again schedule follow-up in our office in 3 months with a PET scan and labs 1 week prior to that visit to further evaluate the hyperenhancing lesion in the left lobe of the liver.  Patient is not a good candidate for liver biopsy being on Coumadin.

## 2022-08-15 NOTE — PROGRESS NOTES
[facility]    OUTPATIENT Physical Therapy ORTHOPEDIC EVALUATION  PLAN OF TREATMENT FOR OUTPATIENT REHABILITATION  (COMPLETE FOR INITIAL CLAIMS ONLY)  Patient's Last Name, First Name, M.I.  YOB: 1945  Shaina Calixto    Provider s Name:  [facility]   Medical Record No.  [id number]   Start of Care Date:  08/11/22   Onset Date:   08/11/22 (date of referral)   Type:     _X__PT   ___OT Medical Diagnosis:    Encounter Diagnosis   Name Primary?    High-tone pelvic floor dysfunction Yes        Treatment Diagnosis:  high tone pelvic floor        Goals:     08/11/22 0700   Urinary Leakage   Previous Functional Level No problems   Constipation/Obstructive Defecation   Previous Functional Level No issues   Current Functional Level Frequency of bowel movements   Performance Level 5-8 /day, variable evacuation amounts   Performance Level 3-4 BM's /day with larger volume   Rationale Work toward normal voiding or evacuation patterns to focus on ADLS, work, or school   Due Date 09/12/22   Performance Level 2 BM's/day with feeling of complete emptying   Rationale Work toward normal voiding or evacuation patterns to focus on ADLS, work, or school   Due Date 10/10/22       Therapy Frequency:  1x week for 6 weeks, then 2x month  Predicted Duration of Therapy Intervention:  8 weeks    Larisa Dumont, PT                 I CERTIFY THE NEED FOR THESE SERVICES FURNISHED UNDER        THIS PLAN OF TREATMENT AND WHILE UNDER MY CARE     (Physician attestation of this document indicates review and certification of the therapy plan).                     Certification Date From:  08/11/22   Certification Date To:  11/07/22    Referring Provider:  Ami Aguilar    Initial Assessment        See Epic Evaluation SOC Date: 08/11/22

## 2022-08-16 ENCOUNTER — THERAPY VISIT (OUTPATIENT)
Dept: PHYSICAL THERAPY | Facility: CLINIC | Age: 77
End: 2022-08-16
Payer: COMMERCIAL

## 2022-08-16 DIAGNOSIS — M62.89 PELVIC FLOOR DYSFUNCTION IN FEMALE: ICD-10-CM

## 2022-08-16 PROCEDURE — 97530 THERAPEUTIC ACTIVITIES: CPT | Mod: GP | Performed by: PHYSICAL THERAPIST

## 2022-08-16 PROCEDURE — 97140 MANUAL THERAPY 1/> REGIONS: CPT | Mod: GP | Performed by: PHYSICAL THERAPIST

## 2022-08-16 PROCEDURE — 97535 SELF CARE MNGMENT TRAINING: CPT | Mod: GP | Performed by: PHYSICAL THERAPIST

## 2022-09-01 ENCOUNTER — THERAPY VISIT (OUTPATIENT)
Dept: PHYSICAL THERAPY | Facility: CLINIC | Age: 77
End: 2022-09-01
Payer: COMMERCIAL

## 2022-09-01 DIAGNOSIS — M62.89 HIGH-TONE PELVIC FLOOR DYSFUNCTION: Primary | ICD-10-CM

## 2022-09-01 PROCEDURE — 97140 MANUAL THERAPY 1/> REGIONS: CPT | Mod: GP | Performed by: PHYSICAL THERAPIST

## 2022-09-01 PROCEDURE — 97535 SELF CARE MNGMENT TRAINING: CPT | Mod: GP | Performed by: PHYSICAL THERAPIST

## 2022-09-08 ENCOUNTER — THERAPY VISIT (OUTPATIENT)
Dept: PHYSICAL THERAPY | Facility: CLINIC | Age: 77
End: 2022-09-08
Payer: COMMERCIAL

## 2022-09-08 DIAGNOSIS — M62.89 HIGH-TONE PELVIC FLOOR DYSFUNCTION: Primary | ICD-10-CM

## 2022-09-08 PROCEDURE — 97140 MANUAL THERAPY 1/> REGIONS: CPT | Mod: GP | Performed by: PHYSICAL THERAPIST

## 2022-09-08 PROCEDURE — 97110 THERAPEUTIC EXERCISES: CPT | Mod: GP | Performed by: PHYSICAL THERAPIST

## 2022-09-15 ENCOUNTER — THERAPY VISIT (OUTPATIENT)
Dept: PHYSICAL THERAPY | Facility: CLINIC | Age: 77
End: 2022-09-15
Payer: COMMERCIAL

## 2022-09-15 DIAGNOSIS — M62.89 HIGH-TONE PELVIC FLOOR DYSFUNCTION: Primary | ICD-10-CM

## 2022-09-15 PROCEDURE — 97110 THERAPEUTIC EXERCISES: CPT | Mod: GP | Performed by: PHYSICAL THERAPIST

## 2022-09-15 PROCEDURE — 90912 BFB TRAINING 1ST 15 MIN: CPT | Mod: GP | Performed by: PHYSICAL THERAPIST

## 2022-09-15 PROCEDURE — 97112 NEUROMUSCULAR REEDUCATION: CPT | Mod: GP | Performed by: PHYSICAL THERAPIST

## 2022-09-22 ENCOUNTER — THERAPY VISIT (OUTPATIENT)
Dept: PHYSICAL THERAPY | Facility: CLINIC | Age: 77
End: 2022-09-22
Payer: COMMERCIAL

## 2022-09-22 ENCOUNTER — TRANSFERRED RECORDS (OUTPATIENT)
Dept: HEALTH INFORMATION MANAGEMENT | Facility: CLINIC | Age: 77
End: 2022-09-22

## 2022-09-22 DIAGNOSIS — M62.89 HIGH-TONE PELVIC FLOOR DYSFUNCTION: Primary | ICD-10-CM

## 2022-09-22 PROCEDURE — 97140 MANUAL THERAPY 1/> REGIONS: CPT | Mod: GP | Performed by: PHYSICAL THERAPIST

## 2022-09-22 PROCEDURE — 97110 THERAPEUTIC EXERCISES: CPT | Mod: GP | Performed by: PHYSICAL THERAPIST

## 2022-10-06 ENCOUNTER — THERAPY VISIT (OUTPATIENT)
Dept: PHYSICAL THERAPY | Facility: CLINIC | Age: 77
End: 2022-10-06
Payer: COMMERCIAL

## 2022-10-06 DIAGNOSIS — M62.89 HIGH-TONE PELVIC FLOOR DYSFUNCTION: Primary | ICD-10-CM

## 2022-10-06 PROCEDURE — 90912 BFB TRAINING 1ST 15 MIN: CPT | Mod: GP | Performed by: PHYSICAL THERAPIST

## 2022-10-06 PROCEDURE — 97535 SELF CARE MNGMENT TRAINING: CPT | Mod: GP | Performed by: PHYSICAL THERAPIST

## 2022-10-06 PROCEDURE — 97140 MANUAL THERAPY 1/> REGIONS: CPT | Mod: GP | Performed by: PHYSICAL THERAPIST

## 2022-10-10 ENCOUNTER — HEALTH MAINTENANCE LETTER (OUTPATIENT)
Age: 77
End: 2022-10-10

## 2022-10-24 ENCOUNTER — THERAPY VISIT (OUTPATIENT)
Dept: PHYSICAL THERAPY | Facility: CLINIC | Age: 77
End: 2022-10-24
Payer: COMMERCIAL

## 2022-10-24 DIAGNOSIS — M62.89 HIGH-TONE PELVIC FLOOR DYSFUNCTION: Primary | ICD-10-CM

## 2022-10-24 PROCEDURE — 97140 MANUAL THERAPY 1/> REGIONS: CPT | Mod: GP | Performed by: PHYSICAL THERAPIST

## 2022-10-24 PROCEDURE — 97535 SELF CARE MNGMENT TRAINING: CPT | Mod: GP | Performed by: PHYSICAL THERAPIST

## 2022-11-01 ENCOUNTER — THERAPY VISIT (OUTPATIENT)
Dept: PHYSICAL THERAPY | Facility: CLINIC | Age: 77
End: 2022-11-01
Payer: COMMERCIAL

## 2022-11-01 DIAGNOSIS — M62.89 HIGH-TONE PELVIC FLOOR DYSFUNCTION: Primary | ICD-10-CM

## 2022-11-01 PROCEDURE — 97140 MANUAL THERAPY 1/> REGIONS: CPT | Mod: GP | Performed by: PHYSICAL THERAPIST

## 2022-11-01 PROCEDURE — 97535 SELF CARE MNGMENT TRAINING: CPT | Mod: GP | Performed by: PHYSICAL THERAPIST

## 2022-11-01 NOTE — PROGRESS NOTES
[facility]    OUTPATIENT Physical Therapy ORTHOPEDIC EVALUATION  PLAN OF TREATMENT FOR OUTPATIENT REHABILITATION  (COMPLETE FOR INITIAL CLAIMS ONLY)  Patient's Last Name, First Name, M.I.  YOB: 1945  Shaina Calixto    Provider s Name:  [facility]   Medical Record No.  [id number]   Start of Care Date:  08/11/22   Onset Date:   08/11/22 (date of referral)   Treatment Diagnosis:  high tone pelvic floor Medical Diagnosis:  High-tone pelvic floor dysfunction       Goals:     11/01/22 0700   Urinary Leakage   Previous Functional Level No problems   Constipation/Obstructive Defecation   Previous Functional Level No issues   Current Functional Level Frequency of bowel movements   Performance Level 3-5/day, variable evacuation amounts   Performance Level 3-4 BM's /day with larger volume   Rationale Work toward normal voiding or evacuation patterns to focus on ADLS, work, or school   Due Date 09/12/22   Date Goal Met 11/01/22   Performance Level 2 BM's/day with feeling of complete emptying   Rationale Work toward normal voiding or evacuation patterns to focus on ADLS, work, or school   Due Date 01/10/23         Therapy Frequency:  2x month  Predicted Duration of Therapy Intervention:  3 months    Larisa Dumont, PT                 I CERTIFY THE NEED FOR THESE SERVICES FURNISHED UNDER        THIS PLAN OF TREATMENT AND WHILE UNDER MY CARE     (Physician attestation of this document indicates review and certification of the therapy plan).                     Certification Date From:  11/08/22   Certification Date To:  01/22/23    Referring Provider:  Referred Self    Initial Assessment        See Epic Evaluation SOC Date: 08/11/22

## 2022-11-01 NOTE — PROGRESS NOTES
"Subjective:  HPI  Physical Exam                    Objective:  System    Physical Exam    General     ROS    Assessment/Plan:    PROGRESS  REPORT    Progress reporting period is from 11/01/22.       SUBJECTIVE  Subjective changes noted by patient:    Subjective: BM's varying from 3-5x day vs 5-8x day but still variable in consistency of stool.Varies from \"mushy to oozy\".Leaving for trip to [facility] next week. Seeing master herbalist when gets back from trip. Then also seeing homeopathic pracitioner in [place].    Current pain level is NA  .     Previous pain level was  NA  .   Changes in function:  Yes (See Goal flowsheet attached for changes in current functional level)  Adverse reaction to treatment or activity: None    OBJECTIVE  Changes noted in objective findings:  Yes,   Objective: L >R OI/LA TTP and tightness today. Also UGT tightness but non-tender. Good relaxation after 15-20 min of manual work.     ASSESSMENT/PLAN  Updated problem list and treatment plan: Diagnosis 1:  Pelvic floor dysfunction/incomplete bowel movements.   Decreased ROM/flexibility - manual therapy and therapeutic exercise  Decreased proprioception - neuro re-education and therapeutic activities  Inflammation - self management/home program  Impaired muscle performance - biofeedback and neuro re-education  Decreased function - therapeutic activities  STG/LTGs have been met or progress has been made towards goals:  Yes (See Goal flow sheet completed today.)  Assessment of Progress: The patient's condition is improving.  Self Management Plans:  Patient has been instructed in a home treatment program.  Patient  has been instructed in self management of symptoms.    Shaina continues to require the following intervention to meet STG and LTG's:  PT    Recommendations:  This patient would benefit from continued therapy.     Frequency:  2 X a month, once daily  Duration:  for 3 months        Please refer to the daily flowsheet for treatment " today, total treatment time and time spent performing 1:1 timed codes.

## 2022-11-15 ENCOUNTER — VIRTUAL VISIT (OUTPATIENT)
Dept: INTERNAL MEDICINE | Facility: CLINIC | Age: 77
End: 2022-11-15
Payer: COMMERCIAL

## 2022-11-15 DIAGNOSIS — U07.1 INFECTION DUE TO 2019 NOVEL CORONAVIRUS: Primary | ICD-10-CM

## 2022-11-15 PROCEDURE — 99213 OFFICE O/P EST LOW 20 MIN: CPT | Mod: CS | Performed by: NURSE PRACTITIONER

## 2022-11-15 RX ORDER — DILTIAZEM HYDROCHLORIDE 120 MG/1
120 CAPSULE, EXTENDED RELEASE ORAL DAILY
COMMUNITY
Start: 2022-10-17

## 2022-11-15 NOTE — PROGRESS NOTES
Shaina is a 76 year old who is being evaluated via a billable telephone visit.      What phone number would you like to be contacted at? [phone]  How would you like to obtain your AVS? [facility]    Assessment & Plan   Problem List Items Addressed This Visit    None  Visit Diagnoses     Infection due to 2019 novel coronavirus    -  Primary    Relevant Medications    nirmatrelvir and ritonavir (PAXLOVID) therapy pack         - She meets high risk criteria for COVID treatment, will initiate Paxlovid, renal dosing  - Decrease the dose of apixaban to 2.5 mg BID for the 5 day duration she is taking Paxlovid  - Regarding heart rate higher than usual, suspect it is elevated r/t dehydration. Encourage 1 cup of electrolyte replacement (Pedialyte) per loose stool or vomiting plus usual 6-8 cups of water  - If she develops sustained high heart rates (above 100), dehydration without ability to replace orally, she will need to be seen in the ED         Return in about 5 days (around 11/20/2022), or if symptoms worsen or fail to improve.    Daniella Huynh NP  [facility]   Shaina is a 76 year old with a history of atrial fibrillation, osteoporosis, dyspepsia, depression, SABRA, and RLS presenting for the following health issues:  Covid Treatment (Vomiting with the additional symptoms, pulse has been  not keeping a steady rate, runny nose)      HPI   She has a history of a atrial fibrillation, her heart rate has been running between . If she is upright for too long she starts to feel a heaviness in her chest but no chest pain, shortness of breath. She just recently traveled, she double masked in order to be able to fly back home. She has had vomiting and diarrhea cannot tolerate eating. She has been able to keep water down and bland foods such as banana, rice.     COVID-19 Symptom Review  How many days ago did these symptoms start? 11/12/2022, POS home test 11/13/2022    Are any of the  following symptoms significant for you?    New or worsening difficulty breathing? No    Worsening cough? Yes, I did cough up mucus but now it seems to be dry    Fever or chills? Yes, I felt chills but no fever     Headache: No but sinuses full    Sore throat: YES    Chest pain: I do have afib, my heart rate is under control but my heart rhythm is not    Diarrhea: YES    Body aches? YES    What treatments has patient tried? No, been traveling   Does patient live in a nursing home, group home, or shelter? No  Does patient have a way to get food/medications during quarantined? Yes, I can probably ask my neighbor to do it          Objective    Vitals - Patient Reported  Weight (Patient Reported): 56.7 kg (125 lb)  SpO2 (Patient Reported): 98      Vitals:  No vitals were obtained today due to virtual visit.    Physical Exam   alert and no distress  PSYCH: Alert and oriented times 3; coherent speech, normal   rate and volume, able to articulate logical thoughts, able   to abstract reason, no tangential thoughts, no hallucinations   or delusions  Her affect is normal  RESP: No cough, no audible wheezing, able to talk in full sentences  Remainder of exam unable to be completed due to telephone visits            Phone call duration: 19 minutes

## 2022-11-29 ENCOUNTER — THERAPY VISIT (OUTPATIENT)
Dept: PHYSICAL THERAPY | Facility: CLINIC | Age: 77
End: 2022-11-29
Payer: COMMERCIAL

## 2022-11-29 DIAGNOSIS — M62.89 HIGH-TONE PELVIC FLOOR DYSFUNCTION: Primary | ICD-10-CM

## 2022-11-29 PROCEDURE — 97140 MANUAL THERAPY 1/> REGIONS: CPT | Mod: GP | Performed by: PHYSICAL THERAPIST

## 2022-11-29 PROCEDURE — 97535 SELF CARE MNGMENT TRAINING: CPT | Mod: GP | Performed by: PHYSICAL THERAPIST

## 2022-12-06 ENCOUNTER — THERAPY VISIT (OUTPATIENT)
Dept: PHYSICAL THERAPY | Facility: CLINIC | Age: 77
End: 2022-12-06
Payer: COMMERCIAL

## 2022-12-06 DIAGNOSIS — M62.89 HIGH-TONE PELVIC FLOOR DYSFUNCTION: Primary | ICD-10-CM

## 2022-12-06 PROCEDURE — 97140 MANUAL THERAPY 1/> REGIONS: CPT | Mod: GP | Performed by: PHYSICAL THERAPIST

## 2022-12-20 ENCOUNTER — THERAPY VISIT (OUTPATIENT)
Dept: PHYSICAL THERAPY | Facility: CLINIC | Age: 77
End: 2022-12-20
Payer: COMMERCIAL

## 2022-12-20 DIAGNOSIS — M62.89 HIGH-TONE PELVIC FLOOR DYSFUNCTION: Primary | ICD-10-CM

## 2022-12-20 PROCEDURE — 97140 MANUAL THERAPY 1/> REGIONS: CPT | Mod: GP | Performed by: PHYSICAL THERAPIST

## 2023-01-02 ENCOUNTER — THERAPY VISIT (OUTPATIENT)
Dept: PHYSICAL THERAPY | Facility: CLINIC | Age: 78
End: 2023-01-02
Payer: COMMERCIAL

## 2023-01-02 DIAGNOSIS — M62.89 HIGH-TONE PELVIC FLOOR DYSFUNCTION: Primary | ICD-10-CM

## 2023-01-02 PROCEDURE — 97535 SELF CARE MNGMENT TRAINING: CPT | Mod: GP | Performed by: PHYSICAL THERAPIST

## 2023-01-02 PROCEDURE — 97140 MANUAL THERAPY 1/> REGIONS: CPT | Mod: GP | Performed by: PHYSICAL THERAPIST

## 2023-01-03 NOTE — PROGRESS NOTES
"Subjective:  HPI  Physical Exam                    Objective:  System    Physical Exam    General     ROS    Assessment/Plan:    PROGRESS  REPORT    Progress reporting period is from 11/01/22 to 01/02/23.       SUBJECTIVE  Subjective changes noted by patient: .  Subjective: Progress being made with bioenergy therapist. More relaxed in pelvic floor.Sometimes if BM is formed it will come out nicely. Sometimes feels like stuck at rectal opening. Overall trending in right direction. Less of the \"mush\". More formed pellets but stuck together.Does report intimacy feels more sensation so happy about that.    Current pain level is NA  .     Previous pain level was  NA  .   Changes in function:  Yes (See Goal flowsheet attached for changes in current functional level)  Adverse reaction to treatment or activity: None    OBJECTIVE  Changes noted in objective findings:  Yes,   Objective: Rev'd HEP and got set up PTRX on phone. Pelvic floor L>R LA/OI tightness, also tightness bulbo but no tenderness. Kegel strength 5/5, good relaxation.     ASSESSMENT/PLAN  Updated problem list and treatment plan: Diagnosis 1:  Pelvic floor dysfunction/  Pain -  manual therapy, splint/taping/bracing/orthotics, self management, education, directional preference exercise and home program  Decreased ROM/flexibility - manual therapy and therapeutic exercise  Decreased strength - therapeutic exercise and therapeutic activities  Decreased proprioception - neuro re-education and therapeutic activities  Impaired muscle performance - neuro re-education  Decreased function - therapeutic activities  STG/LTGs have been met or progress has been made towards goals:  Yes (See Goal flow sheet completed today.)  Assessment of Progress: The patient's progress has slowed.  Self Management Plans:  Patient has been instructed in a home treatment program.  Patient  has been instructed in self management of symptoms.    Shaina continues to require the following " intervention to meet STG and LTG's:  PT    Recommendations:  This patient would benefit from continued therapy.     Frequency:  2 X a month, once daily  Duration:  for 2 months        Please refer to the daily flowsheet for treatment today, total treatment time and time spent performing 1:1 timed codes.

## 2023-01-17 ENCOUNTER — THERAPY VISIT (OUTPATIENT)
Dept: PHYSICAL THERAPY | Facility: CLINIC | Age: 78
End: 2023-01-17
Payer: COMMERCIAL

## 2023-01-17 ENCOUNTER — TELEPHONE (OUTPATIENT)
Dept: PHYSICAL THERAPY | Facility: CLINIC | Age: 78
End: 2023-01-17

## 2023-01-17 DIAGNOSIS — M62.89 HIGH-TONE PELVIC FLOOR DYSFUNCTION: Primary | ICD-10-CM

## 2023-01-17 PROCEDURE — 97535 SELF CARE MNGMENT TRAINING: CPT | Mod: GP | Performed by: PHYSICAL THERAPIST

## 2023-01-17 PROCEDURE — 97140 MANUAL THERAPY 1/> REGIONS: CPT | Mod: GP | Performed by: PHYSICAL THERAPIST

## 2023-01-18 NOTE — PROGRESS NOTES
[facility]    OUTPATIENT Physical Therapy ORTHOPEDIC EVALUATION  PLAN OF TREATMENT FOR OUTPATIENT REHABILITATION  (COMPLETE FOR INITIAL CLAIMS ONLY)  Patient's Last Name, First Name, M.I.  YOB: 1945  Shaina Calixto    Provider s Name:  [facility]   Medical Record No.  [id number]   Start of Care Date:  08/11/22   Onset Date:   08/11/22 (date of referral)   Treatment Diagnosis:  high tone pelvic floor Medical Diagnosis:  High-tone pelvic floor dysfunction       Goals:     01/17/23 0700   Urinary Leakage   Previous Functional Level No problems   Constipation/Obstructive Defecation   Previous Functional Level No issues   Current Functional Level Frequency of bowel movements   Performance Level 6-8/day, variable evacuation amounts   Performance Level 3-4 BM's /day with larger volume   Rationale Work toward normal voiding or evacuation patterns to focus on ADLS, work, or school   Due Date 09/12/22   Date Goal Met 11/01/22   Performance Level 2 BM's/day with feeling of complete emptying   Rationale Work toward normal voiding or evacuation patterns to focus on ADLS, work, or school   Due Date 04/07/23         Therapy Frequency:  2x month  Predicted Duration of Therapy Intervention:  3 months    Larisa Dumont, PT                 I CERTIFY THE NEED FOR THESE SERVICES FURNISHED UNDER        THIS PLAN OF TREATMENT AND WHILE UNDER MY CARE     (Physician attestation of this document indicates review and certification of the therapy plan).                     Certification Date From:  01/23/23   Certification Date To:  04/07/23    Referring Provider:  Referred Self    Initial Assessment        See Epic Evaluation SOC Date: 08/11/22

## 2023-01-18 NOTE — PROGRESS NOTES
Subjective:  HPI  Physical Exam                    Objective:  System    Physical Exam    General     ROS    Assessment/Plan:    PROGRESS  REPORT    Progress reporting period is from 01/02/23 to 01/17/23.       SUBJECTIVE  Subjective changes noted by patient:  .  Subjective: Had a 3 day intensive with bioenergetics. Was up to 8 BM's again but again now tapering.Feels more relaxed after our PT visits for a few days +. Not consistently improving but overall better.    Current pain level is NA  .     Previous pain level was  NA  .   Changes in function:  Yes (See Goal flowsheet attached for changes in current functional level)  Adverse reaction to treatment or activity: None    OBJECTIVE  Changes noted in objective findings:    Objective: Added gentle kegel (25% effort) f/b relaxation with emphasis on relaxation. PF tone greatly improved after session today.Continued tightness in bulbocavernosus but no tenderness and L>R LA/OI tightness, minimal tenderness.     ASSESSMENT/PLAN  Updated problem list and treatment plan: Diagnosis 1:  High tone pelvic floor  Pain -  hot/cold therapy, manual therapy, self management, education, directional preference exercise and home program  Decreased ROM/flexibility - manual therapy and therapeutic exercise  Decreased proprioception - neuro re-education and therapeutic activities  Impaired muscle performance - neuro re-education  Decreased function - therapeutic activities  STG/LTGs have been met or progress has been made towards goals:  Yes (See Goal flow sheet completed today.)  Assessment of Progress: The patient's condition is improving.  Self Management Plans:  Patient has been instructed in a home treatment program.  Patient  has been instructed in self management of symptoms.    Shaina continues to require the following intervention to meet STG and LTG's:  PT    Recommendations:  This patient would benefit from continued therapy.     Frequency:  2 X a month, once daily  Duration:   for 3 months        Please refer to the daily flowsheet for treatment today, total treatment time and time spent performing 1:1 timed codes.

## 2023-02-27 ENCOUNTER — THERAPY VISIT (OUTPATIENT)
Dept: PHYSICAL THERAPY | Facility: CLINIC | Age: 78
End: 2023-02-27
Payer: COMMERCIAL

## 2023-02-27 DIAGNOSIS — M62.89 HIGH-TONE PELVIC FLOOR DYSFUNCTION: Primary | ICD-10-CM

## 2023-02-27 PROCEDURE — 97535 SELF CARE MNGMENT TRAINING: CPT | Mod: GP | Performed by: PHYSICAL THERAPIST

## 2023-02-27 PROCEDURE — 97140 MANUAL THERAPY 1/> REGIONS: CPT | Mod: GP | Performed by: PHYSICAL THERAPIST

## 2023-03-14 ENCOUNTER — THERAPY VISIT (OUTPATIENT)
Dept: PHYSICAL THERAPY | Facility: CLINIC | Age: 78
End: 2023-03-14
Payer: COMMERCIAL

## 2023-03-14 DIAGNOSIS — M62.89 HIGH-TONE PELVIC FLOOR DYSFUNCTION: Primary | ICD-10-CM

## 2023-03-14 PROCEDURE — 97535 SELF CARE MNGMENT TRAINING: CPT | Mod: GP | Performed by: PHYSICAL THERAPIST

## 2023-03-14 PROCEDURE — 97140 MANUAL THERAPY 1/> REGIONS: CPT | Mod: GP | Performed by: PHYSICAL THERAPIST

## 2023-03-14 NOTE — PROGRESS NOTES
Subjective:  HPI  Physical Exam                    Objective:  System    Physical Exam    General     ROS    Assessment/Plan:    DISCHARGE REPORT    Progress reporting period is from 01/17/23 to 03/14/23.       SUBJECTIVE  Subjective changes noted by patient:  .  Subjective: Overall having more formed BM's. Feels proper technique of BM is helping a lot. Feels like is splinting less and emptying better.Less straining and hemorrhoids.    Current pain level is NA  .     Previous pain level was  NA  .   Changes in function:  Yes (See Goal flowsheet attached for changes in current functional level)  Adverse reaction to treatment or activity: None    OBJECTIVE  Changes noted in objective findings:    Objective: PF tension still present but non tender or painful. Kegel 4-5, good relaxation.     ASSESSMENT/PLAN  Updated problem list and treatment plan:   STG/LTGs have been met or progress has been made towards goals:  Yes (See Goal flow sheet completed today.)  Assessment of Progress: The patient's condition is improving.  Self Management Plans:  Patient is independent in a home treatment program.  Patient is independent in self management of symptoms.    Shaina continues to require the following intervention to meet STG and LTG's:  PT intervention is no longer required to meet STG/LTG.    Recommendations:  This patient is ready to be discharged from therapy and continue their home treatment program.    Please refer to the daily flowsheet for treatment today, total treatment time and time spent performing 1:1 timed codes.

## 2023-03-25 ENCOUNTER — HEALTH MAINTENANCE LETTER (OUTPATIENT)
Age: 78
End: 2023-03-25

## 2023-04-06 NOTE — PROGRESS NOTES
[facility] Dermatology Clinic Note - EP    Encounter Date: Apr 7, 2023    Dermatology Problem List:  1. History of CNH    Last FBSE: 4/7/2023  ____________________________________________    Assessment & Plan:     # Lesion to monitor, L clavicle, ddx resolving bruise with underlying lentigo. Bruise has resolved, leaving behind solely a lentigo.  Discussed the natural history and benign nature of this lesion. Reassurance provided that no additional treatment is necessary.      # History of CNH. Not present on today's exam.  Discussed natural etiology and preventative measures. Recommended conservative measures like sleeping on her other side.      # Benign lesions: Multiple benign nevi, solar lentigos, seborrheic keratoses, cherry angiomas. Explained to patient benign nature of lesion. No treatment is necessary at this time unless the lesion changes or becomes symptomatic.   - ABCDs of melanoma were discussed and self skin checks were advised.  - Sun precaution was advised including the use of sun screens of SPF 30 or higher, sun protective clothing, and avoidance of tanning beds.    Procedures Performed:   None    Follow-up: 1 year in person, or sooner for new or changing lesions    Scribe Disclosure:  I, Tho Patel, am serving as a scribe to document services personally performed by Bronson Singh MD based on data collection and the provider's statements to me.     Staff attestation:  The documentation recorded by the scribe accurately reflects the services I personally performed and the decisions I personally made. I have made edits where needed.    Bronson Singh MD  Staff Dermatologist and Dermatopathologist  , Department of Dermatology    ____________________________________________    CC: Skin Check (No prior hx )      HPI:  Ms. Shaina Calixto is a(n) extremely pleasant 77 year old female who presents today as a return patient for a FBSE. Last seen in dermatology by me on 4/22/2022, at  which time patient received a FBSE without further intervention.    Today, patient endorses a previously itchy patch on her L cheek as well as a spot under her L eye that comes and goes. Patient has a history of eczema.    The patient denies any painful, bleeding, or nonhealing lesions, or any new or changing moles.    Patient is otherwise feeling well, without additional skin concerns.     Labs Reviewed:  N/A    Physical Exam:  Vitals: LMP  (LMP Unknown)   SKIN: Total skin excluding the undergarment areas was performed. The exam included the head/face, neck, both arms, chest, back, abdomen, both legs, digits and/or nails.   - Interval resolution of bruising on the L clavicle revealing a brown macule.  - There are dome shaped bright red papules on the trunk and extremities.   - Multiple regular brown pigmented macules and papules are identified on the trunk and extremities.   - Scattered brown macules on sun exposed areas.  - There are waxy stuck on tan to brown papules on the trunk and extremities.  - No other lesions of concern on areas examined.     Medications:  Current Outpatient Medications   Medication     apixaban ANTICOAGULANT (ELIQUIS) 5 MG tablet     CARTIA  MG 24 hr capsule     cholecalciferol (VITAMIN D3) 125 MCG (5000 UT) TABS tablet     UNABLE TO FIND     UNABLE TO FIND     UNABLE TO FIND     UNABLE TO FIND     No current facility-administered medications for this visit.      Past Medical History:   Patient Active Problem List   Diagnosis     Breast cancer screening     Mild major depression (H)     Yeast dermatitis     Stress     Urticaria     CARDIOVASCULAR SCREENING; LDL GOAL LESS THAN 160     High risk HPV infection     Multiple nevi     Cherry angioma     Screening for skin cancer     Restless legs syndrome (RLS)     Plantar fasciitis     HPV (human papilloma virus) infection     Raynaud's disease without gangrene     SABRA (obstructive sleep apnea)     Age-related osteoporosis without  current pathological fracture     Eczema, unspecified type     Dyspepsia     Near syncope     Atrial fibrillation, persistent (H)     Major depressive disorder, recurrent episode, in full remission (H)     Stage 3 chronic kidney disease, unspecified whether stage 3a or 3b CKD (H)     Past Medical History:   Diagnosis Date     Ankylosing spondylitis (H)      HLAB19 +     Atrial fibrillation, persistent (H) 10/15/2021     Complication of anesthesia      Depressive disorder college, young adult    resolved for many years     Eosinophilic cellulitis      Mild major depression (H)      Osteopenia     -2.1; Lumbar -2.5; Fem Neck      Sleep apnea     Has a Mouth comliance     Small intestinal bacterial overgrowth 05/2017    has had for years but finally diagnosed correctly May 2017     Vitamin deficiency        CC No referring provider defined for this encounter. on close of this encounter.    This note has been created using voice recognition software; while it has been reviewed, some errors may persist.

## 2023-04-07 ENCOUNTER — OFFICE VISIT (OUTPATIENT)
Dept: FAMILY MEDICINE | Facility: CLINIC | Age: 78
End: 2023-04-07
Payer: COMMERCIAL

## 2023-04-07 DIAGNOSIS — L82.1 SEBORRHEIC KERATOSIS: Primary | ICD-10-CM

## 2023-04-07 DIAGNOSIS — D18.01 CHERRY ANGIOMA: ICD-10-CM

## 2023-04-07 DIAGNOSIS — L81.4 SOLAR LENTIGO: ICD-10-CM

## 2023-04-07 DIAGNOSIS — D22.9 MULTIPLE BENIGN NEVI: ICD-10-CM

## 2023-04-07 PROCEDURE — 99213 OFFICE O/P EST LOW 20 MIN: CPT | Performed by: DERMATOLOGY

## 2023-04-07 ASSESSMENT — PAIN SCALES - GENERAL: PAINLEVEL: NO PAIN (0)

## 2023-04-07 NOTE — LETTER
4/7/2023         RE: Shaina Calixto  [address] [facility] [place] [address]        Dear Colleague,    Thank you for referring your patient, Shaina Calixto, to the [facility]. Please see a copy of my visit note below.    [facility] Dermatology Clinic Note - EP    Encounter Date: Apr 7, 2023    Dermatology Problem List:  1. History of CNH    Last FBSE: 4/7/2023  ____________________________________________    Assessment & Plan:     # Lesion to monitor, L clavicle, ddx resolving bruise with underlying lentigo. Bruise has resolved, leaving behind solely a lentigo.  Discussed the natural history and benign nature of this lesion. Reassurance provided that no additional treatment is necessary.      # History of CNH. Not present on today's exam.  Discussed natural etiology and preventative measures. Recommended conservative measures like sleeping on her other side.      # Benign lesions: Multiple benign nevi, solar lentigos, seborrheic keratoses, cherry angiomas. Explained to patient benign nature of lesion. No treatment is necessary at this time unless the lesion changes or becomes symptomatic.   - ABCDs of melanoma were discussed and self skin checks were advised.  - Sun precaution was advised including the use of sun screens of SPF 30 or higher, sun protective clothing, and avoidance of tanning beds.    Procedures Performed:   None    Follow-up: 1 year in person, or sooner for new or changing lesions    Scribe Disclosure:  I, Tho Patel, am serving as a scribe to document services personally performed by Bronson Singh MD based on data collection and the provider's statements to me.     Staff attestation:  The documentation recorded by the scribe accurately reflects the services I personally performed and the decisions I personally made. I have made edits where needed.    Bronson Singh MD  Staff Dermatologist and Dermatopathologist  , Department of  Dermatology    ____________________________________________    CC: Skin Check (No prior hx )      HPI:  Ms. Shaina Calixto is a(n) extremely pleasant 77 year old female who presents today as a return patient for a FBSE. Last seen in dermatology by me on 4/22/2022, at which time patient received a FBSE without further intervention.    Today, patient endorses a previously itchy patch on her L cheek as well as a spot under her L eye that comes and goes. Patient has a history of eczema.    The patient denies any painful, bleeding, or nonhealing lesions, or any new or changing moles.    Patient is otherwise feeling well, without additional skin concerns.     Labs Reviewed:  N/A    Physical Exam:  Vitals: LMP  (LMP Unknown)   SKIN: Total skin excluding the undergarment areas was performed. The exam included the head/face, neck, both arms, chest, back, abdomen, both legs, digits and/or nails.   - Interval resolution of bruising on the L clavicle revealing a brown macule.  - There are dome shaped bright red papules on the trunk and extremities.   - Multiple regular brown pigmented macules and papules are identified on the trunk and extremities.   - Scattered brown macules on sun exposed areas.  - There are waxy stuck on tan to brown papules on the trunk and extremities.  - No other lesions of concern on areas examined.     Medications:  Current Outpatient Medications   Medication     apixaban ANTICOAGULANT (ELIQUIS) 5 MG tablet     CARTIA  MG 24 hr capsule     cholecalciferol (VITAMIN D3) 125 MCG (5000 UT) TABS tablet     UNABLE TO FIND     UNABLE TO FIND     UNABLE TO FIND     UNABLE TO FIND     No current facility-administered medications for this visit.      Past Medical History:   Patient Active Problem List   Diagnosis     Breast cancer screening     Mild major depression (H)     Yeast dermatitis     Stress     Urticaria     CARDIOVASCULAR SCREENING; LDL GOAL LESS THAN 160     High risk HPV infection      Multiple nevi     Cherry angioma     Screening for skin cancer     Restless legs syndrome (RLS)     Plantar fasciitis     HPV (human papilloma virus) infection     Raynaud's disease without gangrene     SABRA (obstructive sleep apnea)     Age-related osteoporosis without current pathological fracture     Eczema, unspecified type     Dyspepsia     Near syncope     Atrial fibrillation, persistent (H)     Major depressive disorder, recurrent episode, in full remission (H)     Stage 3 chronic kidney disease, unspecified whether stage 3a or 3b CKD (H)     Past Medical History:   Diagnosis Date     Ankylosing spondylitis (H)      HLAB19 +     Atrial fibrillation, persistent (H) 10/15/2021     Complication of anesthesia      Depressive disorder college, young adult    resolved for many years     Eosinophilic cellulitis      Mild major depression (H)      Osteopenia     -2.1; Lumbar -2.5; Fem Neck      Sleep apnea     Has a Mouth comliance     Small intestinal bacterial overgrowth 05/2017    has had for years but finally diagnosed correctly May 2017     Vitamin deficiency        CC No referring provider defined for this encounter. on close of this encounter.    This note has been created using voice recognition software; while it has been reviewed, some errors may persist.       Again, thank you for allowing me to participate in the care of your patient.        Sincerely,        Bronson Singh MD

## 2023-04-07 NOTE — PATIENT INSTRUCTIONS
Patient Education     Checking for Skin Cancer  You can find cancer early by checking your skin each month. There are 3 kinds of skin cancer. They are melanoma, basal cell carcinoma, and squamous cell carcinoma. Doing monthly skin checks is the best way to find new marks or skin changes. Follow the instructions below for checking your skin.   The ABCDEs of checking moles for melanoma   Check your moles or growths for signs of melanoma using ABCDE:   Asymmetry: the sides of the mole or growth don t match  Border: the edges are ragged, notched, or blurred  Color: the color within the mole or growth varies  Diameter: the mole or growth is larger than 6 mm (size of a pencil eraser)  Evolving: the size, shape, or color of the mole or growth is changing (evolving is not shown in the images below)    Checking for other types of skin cancer  Basal cell carcinoma or squamous cell carcinoma have symptoms such as:     A spot or mole that looks different from all other marks on your skin  Changes in how an area feels, such as itching, tenderness, or pain  Changes in the skin's surface, such as oozing, bleeding, or scaliness  A sore that does not heal  New swelling or redness beyond the border of a mole    Who s at risk?  Anyone can get skin cancer. But you are at greater risk if you have:   Fair skin, light-colored hair, or light-colored eyes  Many moles or abnormal moles on your skin  A history of sunburns from sunlight or tanning beds  A family history of skin cancer  A history of exposure to radiation or chemicals  A weakened immune system  If you have had skin cancer in the past, you are at risk for recurring skin cancer.   How to check your skin  Do your monthly skin checkups in front of a full-length mirror. Check all parts of your body, including your:   Head (ears, face, neck, and scalp)  Torso (front, back, and sides)  Arms (tops, undersides, upper, and lower armpits)  Hands (palms, backs, and fingers, including  under the nails)  Buttocks and genitals  Legs (front, back, and sides)  Feet (tops, soles, toes, including under the nails, and between toes)  If you have a lot of moles, take digital photos of them each month. Make sure to take photos both up close and from a distance. These can help you see if any moles change over time.   Most skin changes are not cancer. But if you see any changes in your skin, call your doctor right away. Only he or she can diagnose a problem. If you have skin cancer, seeing your doctor can be the first step toward getting the treatment that could save your life.   [employer] last reviewed this educational content on 4/1/2019 2000-2020 [employer]. [address] [place] [address], [place] [address]. All rights reserved. This information is not intended as a substitute for professional medical care. Always follow your healthcare professional's instructions.       When should I call my doctor?  If you are worsening or not improving, please, contact us or seek urgent care as noted below.     Who should I call with questions (adults)?  [facility] (adult and pediatric): [phone]  [facility] (adult): [phone]  For urgent needs outside of business hours call the [facility] at [phone] and ask for the dermatology resident on call to be paged  If this is a medical emergency and you are unable to reach an ER, Call [phone]    Who should I call with questions (pediatric)?  [facility]- Pediatric Dermatology  Dr. Sharon Tamez, Dr. Juwan Melendez, Dr. Breonna Brasher, JENNIFER Edwards, Dr. Luly Mancuso, Dr. Shu Lu & Dr. Jj Bernal  Non-urgent nurse triage line; [phone]- Maria R and Sharyn DUGAN Care Coordinatorhilario Jain (/Complex ) [phone]    If you need a prescription refill, please contact your pharmacy. Refills are approved or denied by our  Physicians during normal business hours, Monday through Fridays  Per office policy, refills will not be granted if you have not been seen within the past year (or sooner depending on your child's condition)    Scheduling Information:  Pediatric Appointment Scheduling and Call Center [phone]  Radiology Scheduling- [phone]  Sedation Unit Scheduling- [phone]  [place] Scheduling- General [phone]; Pediatric Dermatology [phone]  Main  Services: [phone]  Italian: [phone]  [place]: [phone]  Hmong/Iraqi/Divehi: [phone]  Preadmission Nursing Department Fax Number: [phone] (Fax all pre-operative paperwork to this number)    For urgent matters arising during evenings, weekends, or holidays that cannot wait for normal business hours please call [phone] and ask for the dermatology resident on call to be paged.

## 2023-04-12 ENCOUNTER — TRANSFERRED RECORDS (OUTPATIENT)
Dept: HEALTH INFORMATION MANAGEMENT | Facility: CLINIC | Age: 78
End: 2023-04-12

## 2023-04-25 ENCOUNTER — TRANSFERRED RECORDS (OUTPATIENT)
Dept: HEALTH INFORMATION MANAGEMENT | Facility: CLINIC | Age: 78
End: 2023-04-25
Payer: COMMERCIAL

## 2023-05-09 ENCOUNTER — TRANSFERRED RECORDS (OUTPATIENT)
Dept: HEALTH INFORMATION MANAGEMENT | Facility: CLINIC | Age: 78
End: 2023-05-09
Payer: COMMERCIAL

## 2023-06-19 ENCOUNTER — TELEPHONE (OUTPATIENT)
Dept: FAMILY MEDICINE | Facility: CLINIC | Age: 78
End: 2023-06-19
Payer: COMMERCIAL

## 2023-06-19 ASSESSMENT — PATIENT HEALTH QUESTIONNAIRE - PHQ9
SUM OF ALL RESPONSES TO PHQ QUESTIONS 1-9: 0
SUM OF ALL RESPONSES TO PHQ QUESTIONS 1-9: 0

## 2023-06-19 NOTE — TELEPHONE ENCOUNTER
Pt reports neuropathy in echo feet for about 9-10 months.   Balance issues   Started in left foot and then moved to right foot after several months.     Pt reports it is intermittent    Wanted to go to [facility]- but they told her she should see PCP, but does NOT want to go to 100 appts.     Hx of AFZOË Gutierrez w/ [facility]- ok for [facility] - will discuss and order labs.     Pt agreeable.     Next 5 appointments (look out 90 days)    Jun 20, 2023  4:30 PM  (Arrive by 4:10 PM)  Provider Visit with JULES Gillette Ra, CNP  [facility] ([facility] [address] [facility] [address] [phone]          Roslyn CARDOZO RN

## 2023-06-20 ENCOUNTER — VIRTUAL VISIT (OUTPATIENT)
Dept: FAMILY MEDICINE | Facility: CLINIC | Age: 78
End: 2023-06-20
Payer: COMMERCIAL

## 2023-06-20 DIAGNOSIS — I48.20 CHRONIC ATRIAL FIBRILLATION, UNSPECIFIED (H): ICD-10-CM

## 2023-06-20 DIAGNOSIS — R79.89 OTHER SPECIFIED ABNORMAL FINDINGS OF BLOOD CHEMISTRY: ICD-10-CM

## 2023-06-20 DIAGNOSIS — R68.89 OTHER GENERAL SYMPTOMS AND SIGNS: ICD-10-CM

## 2023-06-20 DIAGNOSIS — G62.9 NEUROPATHY: Primary | ICD-10-CM

## 2023-06-20 PROCEDURE — 99214 OFFICE O/P EST MOD 30 MIN: CPT | Mod: VID | Performed by: NURSE PRACTITIONER

## 2023-06-20 ASSESSMENT — PATIENT HEALTH QUESTIONNAIRE - PHQ9: SUM OF ALL RESPONSES TO PHQ QUESTIONS 1-9: 0

## 2023-06-20 NOTE — PROGRESS NOTES
Shaina is a 77 year old who is being evaluated via a billable video visit.      How would you like to obtain your AVS? MyChart  If the video visit is dropped, the invitation should be resent by: Text to cell phone: [phone]  Will anyone else be joining your video visit? No    Assessment & Plan     Neuropathy  Will start with labs.  If no etiology found will follow up with [facility] as planned.  She also has some additional labs she would like drawn at a separate time.  She will send me a my chart message with these labs and we will draw what we are able.  - CBC with platelets; Future  - Comprehensive metabolic panel (BMP + Alb, Alk Phos, ALT, AST, Total. Bili, TP); Future  - Hemoglobin A1c; Future  - TSH with free T4 reflex; Future  - Vitamin B12; Future  - Ferritin; Future  - Iron and iron binding capacity; Future    Other specified abnormal findings of blood chemistry  - Hemoglobin A1c; Future    Other general symptoms and signs  - TSH with free T4 reflex; Future    Chronic atrial fibrillation, unspecified (H)  Followed by cardiology.  - TSH with free T4 reflex; Future  - Ferritin; Future  - Iron and iron binding capacity; Future      JULES Gillette Ra CNP  [facility] [place]    Subjective   Shaina is a 77 year old, presenting for the following health issues:  NEUROPATHY        6/20/2023     2:19 PM   Additional Questions   Roomed by DION Vigil     History of Present Illness       Reason for visit:  Neuropathy in both feet  Symptom onset:  More than a month  Symptoms include:  Periodic  during day and night: numbness, tingling, feeling of an anesthesia boot wrapped around my foot  Symptom intensity:  Moderate  Symptom progression:  Worsening  Had these symptoms before:  No  What makes it worse:  No - I just notice it more at night since I have no other distractions at that time  What makes it better:  Sometimes exercise can decrease the symptoms    She eats 4 or more  servings of fruits and vegetables daily.She consumes 0 sweetened beverage(s) daily.She exercises with enough effort to increase her heart rate 10 to 19 minutes per day.  She exercises with enough effort to increase her heart rate 6 days per week.   She is taking medications regularly.    Today's PHQ-9         PHQ-9 Total Score: 0    PHQ-9 Q9 Thoughts of better off dead/self-harm past 2 weeks :   Not at all        Pt is doing well.  She continues work with her accupuncturist.  She continues to experience issues with her bowel and gut but is stable.  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  This has not been possible yet, but she hopes to proceed if this is remedied.  She had some R shoulder pain, tendonitis.  She was seen by PT once and then works with her  regularly which has helped.  She developed neuropathy in her feet over the past year.  This started on the sole of her L foot and wrapped around the lateral aspect and over the top.  This has done the same on her R foot now, although it has not progressed to the top of her foot.    Review of Systems   Constitutional, HEENT, cardiovascular, pulmonary, gi and gu systems are negative, except as otherwise noted.      Objective           Vitals:  No vitals were obtained today due to virtual visit.    Physical Exam   GENERAL: Healthy, alert and no distress  EYES: Eyes grossly normal to inspection.  No discharge or erythema, or obvious scleral/conjunctival abnormalities.  RESP: No audible wheeze, cough, or visible cyanosis.  No visible retractions or increased work of breathing.    SKIN: Visible skin clear. No significant rash, abnormal pigmentation or lesions.  NEURO: Cranial nerves grossly intact.  Mentation and speech appropriate for age.  PSYCH: Mentation appears normal, affect normal/bright, judgement and insight intact, normal speech and appearance well-groomed.              Video-Visit  Details    Type of service:  Video Visit   Video Start Time: 4:00pm  Video End Time:4:28pm    Originating Location (pt. Location): Home  Distant Location (provider location):  Off-site  Platform used for Video Visit: Koby

## 2023-06-30 ENCOUNTER — LAB (OUTPATIENT)
Dept: LAB | Facility: CLINIC | Age: 78
End: 2023-06-30
Payer: COMMERCIAL

## 2023-06-30 DIAGNOSIS — N18.30 STAGE 3 CHRONIC KIDNEY DISEASE, UNSPECIFIED WHETHER STAGE 3A OR 3B CKD (H): ICD-10-CM

## 2023-06-30 DIAGNOSIS — G62.9 NEUROPATHY: Primary | ICD-10-CM

## 2023-06-30 DIAGNOSIS — R68.89 OTHER GENERAL SYMPTOMS AND SIGNS: ICD-10-CM

## 2023-06-30 DIAGNOSIS — R79.89 OTHER SPECIFIED ABNORMAL FINDINGS OF BLOOD CHEMISTRY: ICD-10-CM

## 2023-06-30 DIAGNOSIS — Z13.6 CARDIOVASCULAR SCREENING; LDL GOAL LESS THAN 160: ICD-10-CM

## 2023-06-30 DIAGNOSIS — I48.20 CHRONIC ATRIAL FIBRILLATION, UNSPECIFIED (H): ICD-10-CM

## 2023-06-30 LAB
ALBUMIN SERPL BCG-MCNC: 4.2 G/DL (ref 3.5–5.2)
ALP SERPL-CCNC: 99 U/L (ref 35–104)
ALT SERPL W P-5'-P-CCNC: 19 U/L (ref 0–50)
ANION GAP SERPL CALCULATED.3IONS-SCNC: 12 MMOL/L (ref 7–15)
AST SERPL W P-5'-P-CCNC: 27 U/L (ref 0–45)
BILIRUB SERPL-MCNC: 0.5 MG/DL
BUN SERPL-MCNC: 24.3 MG/DL (ref 8–23)
CALCIUM SERPL-MCNC: 9.5 MG/DL (ref 8.8–10.2)
CHLORIDE SERPL-SCNC: 105 MMOL/L (ref 98–107)
CHOLEST SERPL-MCNC: 180 MG/DL
CREAT SERPL-MCNC: 0.92 MG/DL (ref 0.51–0.95)
CREAT UR-MCNC: 11.6 MG/DL
DEPRECATED HCO3 PLAS-SCNC: 26 MMOL/L (ref 22–29)
ERYTHROCYTE [DISTWIDTH] IN BLOOD BY AUTOMATED COUNT: 13.5 % (ref 10–15)
FERRITIN SERPL-MCNC: 68 NG/ML (ref 11–328)
GFR SERPL CREATININE-BSD FRML MDRD: 64 ML/MIN/1.73M2
GLUCOSE SERPL-MCNC: 93 MG/DL (ref 70–99)
HBA1C MFR BLD: 5.4 % (ref 0–5.6)
HCT VFR BLD AUTO: 44.1 % (ref 35–47)
HDLC SERPL-MCNC: 84 MG/DL
HGB BLD-MCNC: 15.1 G/DL (ref 11.7–15.7)
IRON BINDING CAPACITY (ROCHE): 310 UG/DL (ref 240–430)
IRON SATN MFR SERPL: 36 % (ref 15–46)
IRON SERPL-MCNC: 111 UG/DL (ref 37–145)
LDLC SERPL CALC-MCNC: 88 MG/DL
MCH RBC QN AUTO: 34 PG (ref 26.5–33)
MCHC RBC AUTO-ENTMCNC: 34.2 G/DL (ref 31.5–36.5)
MCV RBC AUTO: 99 FL (ref 78–100)
MICROALBUMIN UR-MCNC: <12 MG/L
MICROALBUMIN/CREAT UR: NORMAL MG/G{CREAT}
NONHDLC SERPL-MCNC: 96 MG/DL
PLATELET # BLD AUTO: 213 10E3/UL (ref 150–450)
POTASSIUM SERPL-SCNC: 5.2 MMOL/L (ref 3.4–5.3)
PROT SERPL-MCNC: 6.7 G/DL (ref 6.4–8.3)
RBC # BLD AUTO: 4.44 10E6/UL (ref 3.8–5.2)
SODIUM SERPL-SCNC: 143 MMOL/L (ref 136–145)
TRIGL SERPL-MCNC: 39 MG/DL
TSH SERPL DL<=0.005 MIU/L-ACNC: 0.98 UIU/ML (ref 0.3–4.2)
VIT B12 SERPL-MCNC: 1425 PG/ML (ref 232–1245)
WBC # BLD AUTO: 5.7 10E3/UL (ref 4–11)

## 2023-06-30 PROCEDURE — 82525 ASSAY OF COPPER: CPT | Mod: 90

## 2023-06-30 PROCEDURE — 36415 COLL VENOUS BLD VENIPUNCTURE: CPT | Mod: GZ

## 2023-06-30 PROCEDURE — 82306 VITAMIN D 25 HYDROXY: CPT

## 2023-06-30 PROCEDURE — 83540 ASSAY OF IRON: CPT

## 2023-06-30 PROCEDURE — 83036 HEMOGLOBIN GLYCOSYLATED A1C: CPT

## 2023-06-30 PROCEDURE — 80053 COMPREHEN METABOLIC PANEL: CPT

## 2023-06-30 PROCEDURE — 84630 ASSAY OF ZINC: CPT | Mod: 90

## 2023-06-30 PROCEDURE — 85027 COMPLETE CBC AUTOMATED: CPT

## 2023-06-30 PROCEDURE — 99000 SPECIMEN HANDLING OFFICE-LAB: CPT | Mod: GZ

## 2023-06-30 PROCEDURE — 82607 VITAMIN B-12: CPT

## 2023-06-30 PROCEDURE — 80061 LIPID PANEL: CPT

## 2023-06-30 PROCEDURE — 82043 UR ALBUMIN QUANTITATIVE: CPT

## 2023-06-30 PROCEDURE — 83550 IRON BINDING TEST: CPT

## 2023-06-30 PROCEDURE — 82570 ASSAY OF URINE CREATININE: CPT

## 2023-06-30 PROCEDURE — 82728 ASSAY OF FERRITIN: CPT

## 2023-06-30 PROCEDURE — 84443 ASSAY THYROID STIM HORMONE: CPT

## 2023-07-01 LAB — DEPRECATED CALCIDIOL+CALCIFEROL SERPL-MC: 64 UG/L (ref 20–75)

## 2023-07-02 LAB
COPPER SERPL-MCNC: 134.6 UG/DL
ZINC SERPL-MCNC: 88.2 UG/DL

## 2023-07-03 ENCOUNTER — MYC MEDICAL ADVICE (OUTPATIENT)
Dept: FAMILY MEDICINE | Facility: CLINIC | Age: 78
End: 2023-07-03
Payer: COMMERCIAL

## 2023-07-03 DIAGNOSIS — G62.9 NEUROPATHY: Primary | ICD-10-CM

## 2023-07-03 DIAGNOSIS — I48.19 ATRIAL FIBRILLATION, PERSISTENT (H): ICD-10-CM

## 2023-08-07 ENCOUNTER — MYC MEDICAL ADVICE (OUTPATIENT)
Dept: FAMILY MEDICINE | Facility: CLINIC | Age: 78
End: 2023-08-07
Payer: COMMERCIAL

## 2023-08-07 NOTE — TELEPHONE ENCOUNTER
Routing to PCP to advise. Would you like patient to submit an e-visit or schedule virtual appointment to request referral?    Thank you,    Meryl MEZA RN, BSN, PHN

## 2023-08-08 ENCOUNTER — VIRTUAL VISIT (OUTPATIENT)
Dept: FAMILY MEDICINE | Facility: CLINIC | Age: 78
End: 2023-08-08
Payer: COMMERCIAL

## 2023-08-08 DIAGNOSIS — G47.33 OSA (OBSTRUCTIVE SLEEP APNEA): Primary | ICD-10-CM

## 2023-08-08 PROCEDURE — 99213 OFFICE O/P EST LOW 20 MIN: CPT | Mod: VID | Performed by: NURSE PRACTITIONER

## 2023-08-08 NOTE — PROGRESS NOTES
Shaina is a 77 year old who is being evaluated via a billable video visit.      How would you like to obtain your AVS? MyChart  If the video visit is dropped, the invitation should be resent by: Text to cell phone: [phone]  Will anyone else be joining your video visit? No          Assessment & Plan     SABRA (obstructive sleep apnea)  Interested in treatment with recommended therapist as well as a positioning device.  She will have the referral faxed and I will sign it.  She is going to stop in with her prescription form for the positioning device for me to sign.                 JULES Gillette Ra, CNP  [facility] [place]    Subjective   Shaina is a 77 year old, presenting for the following health issues:  Referral      8/8/2023     1:30 PM   Additional Questions   Roomed by rodrick coburn cma   Accompanied by self         8/8/2023     1:30 PM   Patient Reported Additional Medications   Patient reports taking the following new medications na       HPI     Referral to [facility] - will be faxing referral form to the clinic       Known sleep apnea.  Causing further issues with dry mouth and tooth breakage.  Working with her dentist.  He recommended a specific retraining therapy of oral and facial muscles to try to help with sleep apnea.    Also recommended a repositioning device to keep you from sleeping on your back.      Review of Systems   Constitutional, HEENT, cardiovascular, pulmonary, gi and gu systems are negative, except as otherwise noted.      Objective           Vitals:  No vitals were obtained today due to virtual visit.    Physical Exam   GENERAL: Healthy, alert and no distress  EYES: Eyes grossly normal to inspection.  No discharge or erythema, or obvious scleral/conjunctival abnormalities.  RESP: No audible wheeze, cough, or visible cyanosis.  No visible retractions or increased work of breathing.    SKIN: Visible skin clear. No significant rash, abnormal pigmentation or  lesions.  NEURO: Cranial nerves grossly intact.  Mentation and speech appropriate for age.  PSYCH: Mentation appears normal, affect normal/bright, judgement and insight intact, normal speech and appearance well-groomed.                Video-Visit Details    Type of service:  Video Visit   Video Start Time: 2:15pm  Video End Time:2:27pm    Originating Location (pt. Location): Home    Distant Location (provider location):  Off-site  Platform used for Video Visit: Koby

## 2023-08-24 ENCOUNTER — TRANSFERRED RECORDS (OUTPATIENT)
Dept: HEALTH INFORMATION MANAGEMENT | Facility: CLINIC | Age: 78
End: 2023-08-24
Payer: COMMERCIAL

## 2023-09-01 ENCOUNTER — TELEPHONE (OUTPATIENT)
Dept: SLEEP MEDICINE | Facility: CLINIC | Age: 78
End: 2023-09-01
Payer: COMMERCIAL

## 2023-09-01 NOTE — TELEPHONE ENCOUNTER
Reason for Call:  Other order    Detailed comments: patient called and was seen  by [facility]     Patient would like an order for a sleep study.    If she cannnot get the sleep study order, and needs to schedule a return visit first, she wants to schedule a sleep return with a different provider.    Patient is interested in the Inspire device.    Please contact patient.  Thank you.    Phone Number Patient can be reached at: Home number on file [phone] (home)    Best Time: any    Can we leave a detailed message on this number? YES    Call taken on 9/1/2023 at 2:52 PM by Sherri Shankar

## 2023-09-06 NOTE — TELEPHONE ENCOUNTER
Detailed message left for patient recommending she schedule follow up appointment to discuss symptoms and Inspire device. Phone number for scheduling provided.     Coral LEDEZMA RN  [facility]

## 2023-09-07 DIAGNOSIS — G47.33 OSA (OBSTRUCTIVE SLEEP APNEA): Primary | ICD-10-CM

## 2023-09-08 DIAGNOSIS — G47.33 OSA (OBSTRUCTIVE SLEEP APNEA): Primary | ICD-10-CM

## 2023-09-11 ENCOUNTER — TRANSFERRED RECORDS (OUTPATIENT)
Dept: FAMILY MEDICINE | Facility: CLINIC | Age: 78
End: 2023-09-11

## 2023-09-12 ENCOUNTER — TELEPHONE (OUTPATIENT)
Dept: FAMILY MEDICINE | Facility: CLINIC | Age: 78
End: 2023-09-12
Payer: COMMERCIAL

## 2023-09-12 NOTE — TELEPHONE ENCOUNTER
Rec'd Speech Therapy POC from [facility]. Placed in PCP basket for review and signature. Fax [phone].    Nathalia Figueroa  Lead

## 2023-09-21 ENCOUNTER — TRANSFERRED RECORDS (OUTPATIENT)
Dept: HEALTH INFORMATION MANAGEMENT | Facility: CLINIC | Age: 78
End: 2023-09-21
Payer: COMMERCIAL

## 2023-09-26 ENCOUNTER — TRANSFERRED RECORDS (OUTPATIENT)
Dept: HEALTH INFORMATION MANAGEMENT | Facility: CLINIC | Age: 78
End: 2023-09-26
Payer: COMMERCIAL

## 2023-09-28 DIAGNOSIS — G60.9 IDIOPATHIC PERIPHERAL NEUROPATHY: Primary | ICD-10-CM

## 2023-09-28 DIAGNOSIS — H53.9 VISUAL DISTURBANCE: ICD-10-CM

## 2023-09-28 DIAGNOSIS — R42 LIGHTHEADEDNESS: ICD-10-CM

## 2023-10-12 ENCOUNTER — LAB (OUTPATIENT)
Dept: LAB | Facility: CLINIC | Age: 78
End: 2023-10-12
Payer: COMMERCIAL

## 2023-10-12 DIAGNOSIS — Z11.59 ENCOUNTER FOR HEPATITIS C SCREENING TEST FOR LOW RISK PATIENT: ICD-10-CM

## 2023-10-12 DIAGNOSIS — G60.9 IDIOPATHIC PERIPHERAL NEUROPATHY: ICD-10-CM

## 2023-10-12 DIAGNOSIS — I48.19 ATRIAL FIBRILLATION, PERSISTENT (H): ICD-10-CM

## 2023-10-12 DIAGNOSIS — R42 LIGHTHEADEDNESS: ICD-10-CM

## 2023-10-12 DIAGNOSIS — Z11.59 ENCOUNTER FOR HEPATITIS C SCREENING TEST FOR LOW RISK PATIENT: Primary | ICD-10-CM

## 2023-10-12 DIAGNOSIS — H53.9 VISUAL DISTURBANCE: ICD-10-CM

## 2023-10-12 DIAGNOSIS — G62.9 NEUROPATHY: ICD-10-CM

## 2023-10-12 LAB
FERRITIN SERPL-MCNC: 56 NG/ML (ref 11–328)
HCV AB SERPL QL IA: NONREACTIVE
TOTAL PROTEIN SERUM FOR ELP: 6.5 G/DL (ref 6.4–8.3)
VIT B12 SERPL-MCNC: 1759 PG/ML (ref 232–1245)

## 2023-10-12 PROCEDURE — 84165 PROTEIN E-PHORESIS SERUM: CPT | Performed by: STUDENT IN AN ORGANIZED HEALTH CARE EDUCATION/TRAINING PROGRAM

## 2023-10-12 PROCEDURE — 84155 ASSAY OF PROTEIN SERUM: CPT

## 2023-10-12 PROCEDURE — 84207 ASSAY OF VITAMIN B-6: CPT | Mod: 90

## 2023-10-12 PROCEDURE — 36415 COLL VENOUS BLD VENIPUNCTURE: CPT

## 2023-10-12 PROCEDURE — 82607 VITAMIN B-12: CPT

## 2023-10-12 PROCEDURE — 86803 HEPATITIS C AB TEST: CPT

## 2023-10-12 PROCEDURE — 99000 SPECIMEN HANDLING OFFICE-LAB: CPT

## 2023-10-12 PROCEDURE — 82728 ASSAY OF FERRITIN: CPT

## 2023-10-12 PROCEDURE — 86334 IMMUNOFIX E-PHORESIS SERUM: CPT | Performed by: STUDENT IN AN ORGANIZED HEALTH CARE EDUCATION/TRAINING PROGRAM

## 2023-10-13 LAB
ALBUMIN SERPL ELPH-MCNC: 4.1 G/DL (ref 3.7–5.1)
ALPHA1 GLOB SERPL ELPH-MCNC: 0.3 G/DL (ref 0.2–0.4)
ALPHA2 GLOB SERPL ELPH-MCNC: 0.6 G/DL (ref 0.5–0.9)
B-GLOBULIN SERPL ELPH-MCNC: 0.7 G/DL (ref 0.6–1)
GAMMA GLOB SERPL ELPH-MCNC: 0.9 G/DL (ref 0.7–1.6)
M PROTEIN SERPL ELPH-MCNC: 0 G/DL
PROT PATTERN SERPL ELPH-IMP: NORMAL
PROT PATTERN SERPL IFE-IMP: NORMAL

## 2023-10-15 LAB — PYRIDOXAL PHOS SERPL-SCNC: 426.5 NMOL/L

## 2023-11-22 ENCOUNTER — TELEPHONE (OUTPATIENT)
Dept: FAMILY MEDICINE | Facility: CLINIC | Age: 78
End: 2023-11-22
Payer: COMMERCIAL

## 2023-11-22 NOTE — TELEPHONE ENCOUNTER
"Received call from patient. Patient was planning to schedule her pneumococcal vaccine. Per chart review, patient had PCV23 completed in 2014. Per CDC, patient would be due for PCV15 or PCV20. Patient wants provider opinion on which she should received.     Patient notes that she \"usually doesn't want to do the 'souped up' version, doesn't want to take anything more than she needs to it\". Patient would prefer call back with provider recommendation.     Paul MUELLER RN 11/22/2023 at 11:51 AM    "

## 2023-12-01 NOTE — TELEPHONE ENCOUNTER
I would recommend the PCV 20 as it is the most up to date.  Well tolerated.  I do not believe she will experience anything more than she would if she were getting one with different coverage of strains.  MEAGAN

## 2023-12-01 NOTE — TELEPHONE ENCOUNTER
Called pt and relayed provider message below. Patient was given an opportunity to ask questions, verbalized understanding of plan, and is agreeable.     Nayeli Chris RN

## 2024-01-04 ENCOUNTER — PATIENT OUTREACH (OUTPATIENT)
Dept: CARE COORDINATION | Facility: CLINIC | Age: 79
End: 2024-01-04
Payer: COMMERCIAL

## 2024-01-18 ENCOUNTER — TELEPHONE (OUTPATIENT)
Dept: FAMILY MEDICINE | Facility: CLINIC | Age: 79
End: 2024-01-18
Payer: COMMERCIAL

## 2024-01-18 ENCOUNTER — PATIENT OUTREACH (OUTPATIENT)
Dept: CARE COORDINATION | Facility: CLINIC | Age: 79
End: 2024-01-18
Payer: COMMERCIAL

## 2024-01-18 NOTE — TELEPHONE ENCOUNTER
I am doubtful Ami will have an opening prior to that. The patient should have her chiropractor send an actual lab order request with the patient along with a fax number of where to send results as it is unlikely Ami will manage the results she receives.

## 2024-01-18 NOTE — TELEPHONE ENCOUNTER
Called the pt to advise of below.    She says the chiropractor says he can't order the labs. She says he can't send orders.  Asked if she could get his fax number in case that could be added to the labs when Ami orders.  She said she can get the labs to the chiropractor if they can't be faxed to him.

## 2024-01-18 NOTE — TELEPHONE ENCOUNTER
Pt calls.    Pt has been working with holistic providers.  She is starting in with a health care provider for GI.  He has 29 labs that he wants her to do.  She does not know how to get it off of the computer.      Advised she would need some kind of visit.  She does not want to do an e-visit.  She says she is not very technical.  Virtual appt scheduled.  She wants me to forward this to Ami Aguilar in case she has any cancellations before the appointment.    She will try to drop off a copy of what labs the chiropractor wants her to have done.          Appointments in Next Year      Jan 26, 2024 12:00 PM  (Arrive by 11:55 AM)  Provider Visit with JULES Gillette Ra, CNP  [facility] ([facility] ) [phone]     Jul 19, 2024 11:15 AM  (Arrive by 11:00 AM)  Return Dermatology with Bronson Singh MD  [facility] [place] ([facility] ) [phone]                 Expiration Date (Month Year): 05/2025

## 2024-01-26 ENCOUNTER — OFFICE VISIT (OUTPATIENT)
Dept: FAMILY MEDICINE | Facility: CLINIC | Age: 79
End: 2024-01-26
Payer: COMMERCIAL

## 2024-01-26 VITALS
HEIGHT: 64 IN | OXYGEN SATURATION: 98 % | WEIGHT: 133 LBS | BODY MASS INDEX: 22.71 KG/M2 | HEART RATE: 84 BPM | SYSTOLIC BLOOD PRESSURE: 128 MMHG | RESPIRATION RATE: 16 BRPM | DIASTOLIC BLOOD PRESSURE: 88 MMHG

## 2024-01-26 DIAGNOSIS — Z01.89 ENCOUNTER FOR LABORATORY TEST: Primary | ICD-10-CM

## 2024-01-26 DIAGNOSIS — Z09 ENCOUNTER FOR FOLLOW-UP EXAMINATION AFTER COMPLETED TREATMENT FOR CONDITIONS OTHER THAN MALIGNANT NEOPLASM: ICD-10-CM

## 2024-01-26 DIAGNOSIS — R68.89 OTHER GENERAL SYMPTOMS AND SIGNS: ICD-10-CM

## 2024-01-26 DIAGNOSIS — R23.9 SKIN CHANGE: ICD-10-CM

## 2024-01-26 PROCEDURE — 99214 OFFICE O/P EST MOD 30 MIN: CPT | Performed by: NURSE PRACTITIONER

## 2024-01-26 RX ORDER — RESPIRATORY SYNCYTIAL VIRUS VACCINE 120MCG/0.5
0.5 KIT INTRAMUSCULAR ONCE
Qty: 1 EACH | Refills: 0 | Status: CANCELLED | OUTPATIENT
Start: 2024-01-26 | End: 2024-01-26

## 2024-01-26 NOTE — PROGRESS NOTES
"  Assessment & Plan     Encounter for laboratory test  - Amylase; Future  - CRP cardiac risk; Future  - D dimer, quantitative; Future  - DHEA sulfate; Future  - GGT; Future  - Homocysteine; Future  - Immunoglobulin E ([facility])  - Insulin level; Future  - Lactate Dehydrogenase; Future  - Lipase; Future  - T3 reverse; Future  - ESR: Erythrocyte sedimentation rate; Future  - Testosterone Free and Total; Future  - Anti thyroglobulin antibody; Future  - Thyroid peroxidase antibody; Future  - T4, free; Future  - TSH; Future  - T3, Free; Future  - T3, total; Future  - Uric acid; Future    Encounter for follow-up examination after completed treatment for conditions other than malignant neoplasm    - GGT; Future    Other general symptoms and signs    - T4, free; Future  - TSH; Future        Subjective   Shaina is a 78 year old, presenting for the following health issues:  Consult (Would like labs drawn for chiropractor) and Derm Problem (Bumps/spots in eye, right ring finger. Ongoing for about 2-3 months. )        1/26/2024    11:15 AM   Additional Questions   Roomed by avinash     HPI     Interested in labs.  Unsure if she will have them completed, but is contemplating establishing with a new functional med group and these labs are needed.  She has been doing well.  Focusing on other  interventions for her overall health.  Energy work.        Review of Systems  Constitutional, HEENT, cardiovascular, pulmonary, gi and gu systems are negative, except as otherwise noted.      Objective    /88   Pulse 84   Resp 16   Ht 1.626 m (5' 4\")   Wt 60.3 kg (133 lb)   LMP  (LMP Unknown)   SpO2 98%   BMI 22.83 kg/m    Body mass index is 22.83 kg/m .  Physical Exam   GENERAL: alert and no distress  PSYCH: mentation appears normal, affect normal/bright            Signed Electronically by: JULES Gillette Ra CNP    "

## 2024-02-01 ENCOUNTER — OFFICE VISIT (OUTPATIENT)
Dept: FAMILY MEDICINE | Facility: CLINIC | Age: 79
End: 2024-02-01
Payer: COMMERCIAL

## 2024-02-01 DIAGNOSIS — R23.9 SKIN CHANGE: ICD-10-CM

## 2024-02-01 PROCEDURE — 99213 OFFICE O/P EST LOW 20 MIN: CPT | Performed by: PHYSICIAN ASSISTANT

## 2024-02-01 NOTE — LETTER
2/1/2024         RE: Shaina Calixto  [address] [facility] [address]        Dear Colleague,    Thank you for referring your patient, Shaina Calixto, to the [facility] [place]. Please see a copy of my visit note below.    [facility] Note  Encounter Date: Feb 1, 2024  Office Visit      Dermatology Problem List:  1. Hx of CNH  2. Milia, bilateral eyelids.     FBSE 4/7/23  ____________________________________________    Assessment & Plan:  # Milia x2 - eyelids  - Discussed the natural history and benign nature of this lesion. Reassurance provided that no additional treatment is necessary.     - ok to continue treatments for dry eye  - edu on extraction procedure, pt defers for now    Procedures Performed:   None    Follow-up: prn for new or changing lesions    Staff and scribe     Scribe Disclosure:   I, AISLINN TRUJILLO, am serving as a scribe; to document services personally performed by Whit Magallanes PA-C -based on data collection and the provider's statements to me.     Provider Disclosure:  I agree with above History, Review of Systems, Physical exam and Plan.  I have reviewed the content of the documentation and have edited it as needed. I have personally performed the services documented here and the documentation accurately represents those services and the decisions I have made.      Electronically signed by:     All risks, benefits and alternatives were discussed with patient.  Patient is in agreement and understands the assessment and plan.  All questions were answered.    Whit Magallanes PA-C, MPAS  [facility]: Phone: [phone], Fax: [phone]  [facility]: Phone: [phone],  Fax: [phone]  [facility] [place]: Phone: [phone], Fax: [phone]  ____________________________________________    CC: Derm Problem (Milia under  eyes/eyelid)    Reviewed patient's past medical history and pertinent chart review prior to patient's visit today.     HPI:  Ms. Shaina Calixto is a 78 year old female who presents today as a return patient for possible Milia under her eyes near her eyelids. Patient has chronic dry eye and is wondering if the treatment she uses for this will cause them to get worse or if these will prevent her from doing her dry eye treatments. Last seen by Dr. Singh for a FBSE 4/7/23.    Patient is otherwise feeling well, without additional concerns.    Labs:  N/A    Physical Exam:  Vitals: LMP  (LMP Unknown)   SKIN: Focused examination of Bilat eyes/eyelids was performed.   - There are white 1-2 mm papules on the x 1 on each eye lids   - No other lesions of concern on areas examined.     Medications:  Current Outpatient Medications   Medication     apixaban ANTICOAGULANT (ELIQUIS) 5 MG tablet     CARTIA  MG 24 hr capsule     cholecalciferol (VITAMIN D3) 125 MCG (5000 UT) TABS tablet     UNABLE TO FIND     No current facility-administered medications for this visit.      Past Medical/Surgical History:   Patient Active Problem List   Diagnosis     Breast cancer screening     Mild major depression (H)     Yeast dermatitis     Stress     Urticaria     CARDIOVASCULAR SCREENING; LDL GOAL LESS THAN 160     High risk HPV infection     Multiple nevi     Cherry angioma     Screening for skin cancer     Restless legs syndrome (RLS)     Plantar fasciitis     HPV (human papilloma virus) infection     Raynaud's disease without gangrene     SABRA (obstructive sleep apnea)     Age-related osteoporosis without current pathological fracture     Eczema, unspecified type     Dyspepsia     Near syncope     Atrial fibrillation, persistent (H)     Major depressive disorder, recurrent episode, in full remission (H24)     Stage 3 chronic kidney disease, unspecified whether stage 3a or 3b CKD (H)     Past Medical History:   Diagnosis Date      Ankylosing spondylitis (H)      HLAB19 +     Atrial fibrillation, persistent (H) 10/15/2021     Complication of anesthesia      Depressive disorder college, young adult    resolved for many years     Eosinophilic cellulitis      Mild major depression (H24)      Osteopenia     -2.1; Lumbar -2.5; Fem Neck      Sleep apnea     Has a Mouth comliance     Small intestinal bacterial overgrowth 05/2017    has had for years but finally diagnosed correctly May 2017     Vitamin deficiency                         Again, thank you for allowing me to participate in the care of your patient.        Sincerely,        Whit Magallanes PA-C

## 2024-02-01 NOTE — PATIENT INSTRUCTIONS
Patient Education       Proper skin care from [facility]:    -Eliminate harsh soaps as they strip the natural oils from the skin, often resulting in dry itchy skin ( i.e. Dial, Zest, [place])  -Use mild soaps such as Cetaphil or Dove Sensitive Skin in the shower. You do not need to use soap on arms, legs, and trunk every time you shower unless visibly soiled.   -Avoid hot or cold showers.  -After showering, lightly dry off and apply moisturizing within 2-3 minutes. This will help trap moisture in the skin.   -Aggressive use of a moisturizer at least 1-2 times a day to the entire body (including -Vanicream, Cetaphil, Aquaphor or Cerave) and moisturize hands after every washing.  -We recommend using moisturizers that come in a tub that needs to be scooped out, not a pump. This has more of an oil base. It will hold moisture in your skin much better than a water base moisturizer. The above recommended are non-pore clogging.      Wear a sunscreen with at least SPF 30 on your face, ears, neck and V of the chest daily. Wear sunscreen on other areas of the body if those areas are exposed to the sun throughout the day. Sunscreens can contain physical and/or chemical blockers. Physical blockers are less likely to clog pores, these include zinc oxide and titanium dioxide. Reapply every two hour and after swimming.     Sunscreen examples: https://www.ewg.org/sunscreen/    UV radiation  UVA radiation remains constant throughout the day and throughout the year. It is a longer wavelength than UVB and therefore penetrates deeper into the skin leading to immediate and delayed tanning, photoaging, and skin cancer. 70-80% of UVA and UVB radiation occurs between the hours of 10am-2pm.  UVB radiation  UVB radiation causes the most harmful effects and is more significant during the summer months. However, snow and ice can reflect UVB radiation leading to skin damage during the winter months as well. UVB radiation is  responsible for tanning, burning, inflammation, delayed erythema (pinkness), pigmentation (brown spots), and skin cancer.     I recommend self monthly full body exams and yearly full body exams with a dermatology provider. If you develop a new or changing lesion please follow up for examination. Most skin cancers are pink and scaly or pink and pearly. However, we do see blue/brown/black skin cancers.  Consider the ABCDEs of melanoma when giving yourself your monthly full body exam ( don't forget the groin, buttocks, feet, toes, etc). A-asymmetry, B-borders, C-color, D-diameter, E-elevation or evolving. If you see any of these changes please follow up in clinic. If you cannot see your back I recommend purchasing a hand held mirror to use with a larger wall mirror.       Checking for Skin Cancer  You can find cancer early by checking your skin each month. There are 3 kinds of skin cancer. They are melanoma, basal cell carcinoma, and squamous cell carcinoma. Doing monthly skin checks is the best way to find new marks or skin changes. Follow the instructions below for checking your skin.   The ABCDEs of checking moles for melanoma   Check your moles or growths for signs of melanoma using ABCDE:   Asymmetry: the sides of the mole or growth don t match  Border: the edges are ragged, notched, or blurred  Color: the color within the mole or growth varies  Diameter: the mole or growth is larger than 6 mm (size of a pencil eraser)  Evolving: the size, shape, or color of the mole or growth is changing (evolving is not shown in the images below)    Checking for other types of skin cancer  Basal cell carcinoma or squamous cell carcinoma have symptoms such as:     A spot or mole that looks different from all other marks on your skin  Changes in how an area feels, such as itching, tenderness, or pain  Changes in the skin's surface, such as oozing, bleeding, or scaliness  A sore that does not heal  New swelling or redness beyond  the border of a mole    Who s at risk?  Anyone can get skin cancer. But you are at greater risk if you have:   Fair skin, light-colored hair, or light-colored eyes  Many moles or abnormal moles on your skin  A history of sunburns from sunlight or tanning beds  A family history of skin cancer  A history of exposure to radiation or chemicals  A weakened immune system  If you have had skin cancer in the past, you are at risk for recurring skin cancer.   How to check your skin  Do your monthly skin checkups in front of a full-length mirror. Check all parts of your body, including your:   Head (ears, face, neck, and scalp)  Torso (front, back, and sides)  Arms (tops, undersides, upper, and lower armpits)  Hands (palms, backs, and fingers, including under the nails)  Buttocks and genitals  Legs (front, back, and sides)  Feet (tops, soles, toes, including under the nails, and between toes)  If you have a lot of moles, take digital photos of them each month. Make sure to take photos both up close and from a distance. These can help you see if any moles change over time.   Most skin changes are not cancer. But if you see any changes in your skin, call your doctor right away. Only he or she can diagnose a problem. If you have skin cancer, seeing your doctor can be the first step toward getting the treatment that could save your life.   [employer] last reviewed this educational content on 4/1/2019 2000-2020 [employer]. [address] [place] [address], [place] [address]. All rights reserved. This information is not intended as a substitute for professional medical care. Always follow your healthcare professional's instructions.       When should I call my doctor?  If you are worsening or not improving, please, contact us or seek urgent care as noted below.     Who should I call with questions (adults)?  [facility] (adult and pediatric): [phone]  [facility] (adult): [phone]  [facility] ([place], [place], [place] and [place]) [phone]  For urgent needs outside of business hours call the [facility] at [phone] and ask for the dermatology resident on call to be paged  If this is a medical emergency and you are unable to reach an ER, Call 911      If you need a prescription refill, please contact your pharmacy. Refills are approved or denied by our Physicians during normal business hours, Monday through Fridays  Per office policy, refills will not be granted if you have not been seen within the past year (or sooner depending on your child's condition)

## 2024-03-08 ENCOUNTER — TELEPHONE (OUTPATIENT)
Dept: FAMILY MEDICINE | Facility: CLINIC | Age: 79
End: 2024-03-08
Payer: COMMERCIAL

## 2024-03-08 NOTE — TELEPHONE ENCOUNTER
Ami,    Pt called, she has a form that she needs you to fill out and order a special blood test for IBS. It's the first ever blood test for IBS  She states that this is something that has been discussed in a visit    LOV: 1/26/24    Pt called the lab and they are able to run the test  She will drop the forms on 3/8/24 and she will request that the  put it in your in box.    Thank you  Russel Jordan RN on 3/8/2024 at 8:26 AM

## 2024-03-12 NOTE — TELEPHONE ENCOUNTER
Rec'd IBS Test Paperwork and Letter. Placed in PCP basket for review and signature. Call for .    Nathalia Figueroa  Lead   [facility]

## 2024-03-17 ENCOUNTER — HEALTH MAINTENANCE LETTER (OUTPATIENT)
Age: 79
End: 2024-03-17

## 2024-04-09 ENCOUNTER — VIRTUAL VISIT (OUTPATIENT)
Dept: FAMILY MEDICINE | Facility: CLINIC | Age: 79
End: 2024-04-09
Payer: COMMERCIAL

## 2024-04-09 ENCOUNTER — MYC MEDICAL ADVICE (OUTPATIENT)
Dept: FAMILY MEDICINE | Facility: CLINIC | Age: 79
End: 2024-04-09

## 2024-04-09 DIAGNOSIS — E04.1 THYROID NODULE: ICD-10-CM

## 2024-04-09 DIAGNOSIS — M62.89 PELVIC FLOOR DYSFUNCTION IN FEMALE: Primary | ICD-10-CM

## 2024-04-09 PROCEDURE — 99214 OFFICE O/P EST MOD 30 MIN: CPT | Mod: 95 | Performed by: NURSE PRACTITIONER

## 2024-04-09 ASSESSMENT — PATIENT HEALTH QUESTIONNAIRE - PHQ9
SUM OF ALL RESPONSES TO PHQ QUESTIONS 1-9: 0
SUM OF ALL RESPONSES TO PHQ QUESTIONS 1-9: 0

## 2024-04-23 NOTE — PROGRESS NOTES
Shaina is a 78 year old who is being evaluated via a billable video visit.          Assessment & Plan     Pelvic floor dysfunction in female  - Physical Therapy  Referral; Future    Thyroid nodule  Incidental finding.  US ordered.  - US Thyroid; Future          Subjective   Shaina is a 78 year old, presenting for the following health issues:  No chief complaint on file.      Video Start Time: 100pm    HPI     Recent imaging.  Thyroid nodules seen.  Needs follow up.    Also would like referral for PT.      Review of Systems  Constitutional, HEENT, cardiovascular, pulmonary, gi and gu systems are negative, except as otherwise noted.      Objective           Vitals:  No vitals were obtained today due to virtual visit.    Physical Exam   GENERAL: alert and no distress  EYES: Eyes grossly normal to inspection.  No discharge or erythema, or obvious scleral/conjunctival abnormalities.  RESP: No audible wheeze, cough, or visible cyanosis.    SKIN: Visible skin clear. No significant rash, abnormal pigmentation or lesions.  NEURO: Cranial nerves grossly intact.  Mentation and speech appropriate for age.  PSYCH: Appropriate affect, tone, and pace of words          Video-Visit Details    Type of service:  Video Visit   Video End Time:125pm  Originating Location (pt. Location): Home    Distant Location (provider location):  On-site  Platform used for Video Visit: Koby  Signed Electronically by: JULES Gillette Ra, CNP

## 2024-04-29 ENCOUNTER — TELEPHONE (OUTPATIENT)
Dept: FAMILY MEDICINE | Facility: CLINIC | Age: 79
End: 2024-04-29
Payer: COMMERCIAL

## 2024-04-29 NOTE — TELEPHONE ENCOUNTER
Patient Returning Call    Reason for call:  PT HAD APPT WITH NICK APRIL 9TH, AND IS WONDERING IF THAT WILL DO FOR HER APPT FOR SURGERY ON 05/01. PT HAS FAX NUMBER AND NEEDS LETTER FAXED BY [facility], BUT ASAP. IF POSSIBLE/[facility]CP[facility] FOR SEDATION/SURGERY FOR ABSCESS IN [place]. FAX NUMBER: [phone], [phone]. PLEASE CALL WHEN FAX HAS BEEN SENT OUT.     Information relayed to patient: WHEN ABLE     Patient has additional questions:  No      Could we send this information to you in for[facility] or would you prefer to receive a phone call?:   Patient would prefer a phone call   Okay to leave a detailed message?: Yes at Cell number on file:  PT ALSO HAS LANDLINE [phone]  Telephone Information:   Mobile [phone]

## 2024-04-30 ENCOUNTER — OFFICE VISIT (OUTPATIENT)
Dept: FAMILY MEDICINE | Facility: CLINIC | Age: 79
End: 2024-04-30
Payer: COMMERCIAL

## 2024-04-30 VITALS
DIASTOLIC BLOOD PRESSURE: 75 MMHG | BODY MASS INDEX: 23.18 KG/M2 | HEART RATE: 85 BPM | SYSTOLIC BLOOD PRESSURE: 135 MMHG | RESPIRATION RATE: 12 BRPM | OXYGEN SATURATION: 98 % | WEIGHT: 135.8 LBS | TEMPERATURE: 97.7 F | HEIGHT: 64 IN

## 2024-04-30 DIAGNOSIS — G47.33 OSA (OBSTRUCTIVE SLEEP APNEA): ICD-10-CM

## 2024-04-30 DIAGNOSIS — N18.2 CKD (CHRONIC KIDNEY DISEASE), STAGE II: ICD-10-CM

## 2024-04-30 DIAGNOSIS — Z01.818 PREOP GENERAL PHYSICAL EXAM: Primary | ICD-10-CM

## 2024-04-30 LAB
BASOPHILS # BLD AUTO: 0 10E3/UL (ref 0–0.2)
BASOPHILS NFR BLD AUTO: 0 %
EOSINOPHIL # BLD AUTO: 0.1 10E3/UL (ref 0–0.7)
EOSINOPHIL NFR BLD AUTO: 1 %
ERYTHROCYTE [DISTWIDTH] IN BLOOD BY AUTOMATED COUNT: 13.1 % (ref 10–15)
HCT VFR BLD AUTO: 43.6 % (ref 35–47)
HGB BLD-MCNC: 14.9 G/DL (ref 11.7–15.7)
IMM GRANULOCYTES # BLD: 0 10E3/UL
IMM GRANULOCYTES NFR BLD: 0 %
LYMPHOCYTES # BLD AUTO: 0.8 10E3/UL (ref 0.8–5.3)
LYMPHOCYTES NFR BLD AUTO: 9 %
MCH RBC QN AUTO: 34.3 PG (ref 26.5–33)
MCHC RBC AUTO-ENTMCNC: 34.2 G/DL (ref 31.5–36.5)
MCV RBC AUTO: 100 FL (ref 78–100)
MONOCYTES # BLD AUTO: 0.5 10E3/UL (ref 0–1.3)
MONOCYTES NFR BLD AUTO: 5 %
NEUTROPHILS # BLD AUTO: 7.4 10E3/UL (ref 1.6–8.3)
NEUTROPHILS NFR BLD AUTO: 85 %
PLATELET # BLD AUTO: 265 10E3/UL (ref 150–450)
RBC # BLD AUTO: 4.35 10E6/UL (ref 3.8–5.2)
WBC # BLD AUTO: 8.8 10E3/UL (ref 4–11)

## 2024-04-30 PROCEDURE — 36415 COLL VENOUS BLD VENIPUNCTURE: CPT | Performed by: FAMILY MEDICINE

## 2024-04-30 PROCEDURE — 80048 BASIC METABOLIC PNL TOTAL CA: CPT | Performed by: FAMILY MEDICINE

## 2024-04-30 PROCEDURE — 93000 ELECTROCARDIOGRAM COMPLETE: CPT | Performed by: FAMILY MEDICINE

## 2024-04-30 PROCEDURE — 99214 OFFICE O/P EST MOD 30 MIN: CPT | Performed by: FAMILY MEDICINE

## 2024-04-30 PROCEDURE — 85025 COMPLETE CBC W/AUTO DIFF WBC: CPT | Performed by: FAMILY MEDICINE

## 2024-04-30 RX ORDER — RESPIRATORY SYNCYTIAL VIRUS VACCINE 120MCG/0.5
0.5 KIT INTRAMUSCULAR ONCE
Qty: 1 EACH | Refills: 0 | Status: CANCELLED | OUTPATIENT
Start: 2024-04-30 | End: 2024-04-30

## 2024-04-30 SDOH — HEALTH STABILITY: PHYSICAL HEALTH: ON AVERAGE, HOW MANY DAYS PER WEEK DO YOU ENGAGE IN MODERATE TO STRENUOUS EXERCISE (LIKE A BRISK WALK)?: 5 DAYS

## 2024-04-30 ASSESSMENT — SOCIAL DETERMINANTS OF HEALTH (SDOH)
DO YOU BELONG TO ANY CLUBS OR ORGANIZATIONS SUCH AS CHURCH GROUPS UNIONS, FRATERNAL OR ATHLETIC GROUPS, OR SCHOOL GROUPS?: YES
IN A TYPICAL WEEK, HOW MANY TIMES DO YOU TALK ON THE PHONE WITH FAMILY, FRIENDS, OR NEIGHBORS?: MORE THAN THREE TIMES A WEEK
HOW OFTEN DO YOU ATTENT MEETINGS OF THE CLUB OR ORGANIZATION YOU BELONG TO?: MORE THAN 4 TIMES PER YEAR

## 2024-04-30 ASSESSMENT — LIFESTYLE VARIABLES
AUDIT-C TOTAL SCORE: 0
HOW OFTEN DO YOU HAVE SIX OR MORE DRINKS ON ONE OCCASION: NEVER
HOW MANY STANDARD DRINKS CONTAINING ALCOHOL DO YOU HAVE ON A TYPICAL DAY: PATIENT DOES NOT DRINK
SKIP TO QUESTIONS 9-10: 1
HOW OFTEN DO YOU HAVE A DRINK CONTAINING ALCOHOL: NEVER

## 2024-04-30 NOTE — PROGRESS NOTES
Preoperative Evaluation  [facility]  [address] [address] [address]  Phone: [phone]  Primary Provider: Ami Aguilar Ra  Pre-op Performing Provider: KIRSTIE ALEX  Apr 30, 2024       Shaina is a 78 year old, presenting for the following:  Pre-Op Exam        4/30/2024     1:14 PM   Additional Questions   Roomed by Reena Myles     Surgical Information  Surgery/Procedure: Exam under anesthesia of the rectum and drainage of abcess   Surgery Location: [facility]  Surgeon: Bonnie Rodriguez MD   Surgery Date: 5/1/2024  Time of Surgery: 3:35 pm  Where patient plans to recover: Recover at home with a friend  Fax number for surgical facility: Note does not need to be faxed, will be available electronically in Epic.    Assessment & Plan     The proposed surgical procedure is considered LOW risk.    Problem List Items Addressed This Visit       CKD (chronic kidney disease), stage II     GFR Estimate   Date Value Ref Range Status   06/30/2023 64 >60 mL/min/1.73m2 Final   11/19/2020 60 (L) >60 mL/min/[1.73_m2] Final     Comment:     Non  GFR Calc  Starting 12/18/2018, serum creatinine based estimated GFR (eGFR) will be   calculated using the Chronic Kidney Disease Epidemiology Collaboration   (CKD-EPI) equation.     No ACE/ARB             SABRA (obstructive sleep apnea)     (R recovery room may prove fruitful         Preop general physical exam - Primary    Relevant Orders    CBC with platelets and differential (Completed)    Basic metabolic panel  (Ca, Cl, CO2, Creat, Gluc, K, Na, BUN)    EKG 12-lead complete w/read - Clinics (Completed)               - No identified additional risk factors other than previously addressed    Antiplatelet or Anticoagulation Medication Instructions  Patient will hold her Eliquis 2 days prior to procedure      Additional Medication Instructions  Patient will take her Cartia AM of  procedure    Recommendation  APPROVAL GIVEN to proceed with proposed procedure, without further diagnostic evaluation.          Subjective       HPI related to upcoming procedure: History of anal fistula        4/30/2024     1:11 PM   Preop Questions   1. Have you ever had a heart attack or stroke? No   2. Have you ever had surgery on your heart or blood vessels, such as a stent placement, a coronary artery bypass, or surgery on an artery in your head, neck, heart, or legs? No   3. Do you have chest pain with activity? No   4. Do you have a history of  heart failure? No   5. Do you currently have a cold, bronchitis or symptoms of other infection? No   6. Do you have a cough, shortness of breath, or wheezing? No   7. Do you or anyone in your family have previous history of blood clots? No   8. Do you or does anyone in your family have a serious bleeding problem such as prolonged bleeding following surgeries or cuts? No   9. Have you ever had problems with anemia or been told to take iron pills? YES -    10. Have you had any abnormal blood loss such as black, tarry or bloody stools, or abnormal vaginal bleeding? No   11. Have you ever had a blood transfusion? No   12. Are you willing to have a blood transfusion if it is medically needed before, during, or after your surgery? Yes   13. Have you or any of your relatives ever had problems with anesthesia? No   14. Do you have sleep apnea, excessive snoring or daytime drowsiness? YES -    14a. Do you have a CPAP machine? No   15. Do you have any artifical heart valves or other implanted medical devices like a pacemaker, defibrillator, or continuous glucose monitor? No   16. Do you have artificial joints? No   17. Are you allergic to latex? No       Health Care Directive  Patient does not have a Health Care Directive or Living Will:     Preoperative Review of    reviewed - no record of controlled substances prescribed.          Patient Active Problem List     Diagnosis Date Noted    Preop general physical exam 04/30/2024     Priority: Medium    Major depressive disorder, recurrent episode, in full remission (H24) 01/07/2022     Priority: Medium    CKD (chronic kidney disease), stage II 01/07/2022     Priority: Medium    Atrial fibrillation, persistent (H) 10/15/2021     Priority: Medium    Restless legs syndrome (RLS) 09/02/2018     Priority: Medium    Plantar fasciitis 09/02/2018     Priority: Medium    HPV (human papilloma virus) infection 09/02/2018     Priority: Medium    Raynaud's disease without gangrene 09/02/2018     Priority: Medium    SABRA (obstructive sleep apnea) 09/02/2018     Priority: Medium    Age-related osteoporosis without current pathological fracture 09/02/2018     Priority: Medium    Eczema, unspecified type 09/02/2018     Priority: Medium    Dyspepsia 09/02/2018     Priority: Medium    Near syncope 09/02/2018     Priority: Medium    Multiple nevi 07/24/2018     Priority: Medium    Cherry angioma 07/24/2018     Priority: Medium    Screening for skin cancer 07/24/2018     Priority: Medium    CARDIOVASCULAR SCREENING; LDL GOAL LESS THAN 160 09/17/2015     Priority: Medium    High risk HPV infection 09/17/2015     Priority: Medium    Urticaria 09/22/2014     Priority: Medium     Problem list name updated by automated process. Provider to review      Yeast dermatitis 02/21/2014     Priority: Medium    Stress 02/21/2014     Priority: Medium    Mild major depression (H) 08/07/2013     Priority: Medium    Breast cancer screening 07/16/2012     Priority: Medium      Past Medical History:   Diagnosis Date    Ankylosing spondylitis (H)      HLAB19 +    Atrial fibrillation, persistent (H) 10/15/2021    CKD (chronic kidney disease), stage II 01/07/2022    Complication of anesthesia     Depressive disorder college, young adult    resolved for many years    Eosinophilic cellulitis     Mild major depression (H24)     Osteopenia     -2.1; Lumbar -2.5; Fem Neck      Sleep apnea     Has a Mouth appliance    Small intestinal bacterial overgrowth 05/2017    has had for years but finally diagnosed correctly May 2017    Vitamin deficiency      Past Surgical History:   Procedure Laterality Date    ARTHROSCOPY KNEE RT/LT Right 2004    BIOPSY  Dec. 2016    spongiotic dermatitis    CARDIAC SURGERY  01/24/2022    cardioversion     COLONOSCOPY  May 2015    normal    EXAM UNDER ANESTHESIA RECTUM N/A 3/15/2022    Procedure: Exam under anesthesia, biopsy of external lesion and placement of seton;  Surgeon: Bonnie Rodriguez MD;  Location: [facility] OR    FOOT SURGERY Right 1995    due to foot injury    FOOT SURGERY Right 2000    ayers's neuroma    LASER YAG CAPSULOTOMY Bilateral 12/29/15    PHACOEMULSIFICATION CLEAR CORNEA WITH STANDARD INTRAOCULAR LENS IMPLANT Right 10/1/14    PHACOEMULSIFICATION CLEAR CORNEA WITH STANDARD INTRAOCULAR LENS IMPLANT Left 10/08/14    SOFT TISSUE SURGERY  1995    multiple crush & fx's to right foot     Current Outpatient Medications   Medication Sig Dispense Refill    apixaban ANTICOAGULANT (ELIQUIS) 5 MG tablet Take 5 mg by mouth 2 times daily       CARTIA  MG 24 hr capsule Take 120 mg by mouth daily      cholecalciferol (VITAMIN D3) 125 MCG (5000 UT) TABS tablet Take 1 tablet by mouth daily      UNABLE TO FIND daily MEDICATION NAME: Butyrate 300 mg soft gel         Allergies   Allergen Reactions    Floraquin [Iodoquinol] Anaphylaxis     LOST SENSE OF SMELL AND TASTE    Quinolones      Other reaction(s): Other - Describe In Comment Field  Lost sense of smell.  Pt would prefer to not take any of the medications in Fluoroquninolone group of antibiotics.  Other reaction(s): Other (see comments)  Lost sense of smell.  Pt would prefer to not take any of the medications in Fluoroquninolone group of antibiotics.  Other reaction(s): Other - Describe In Comment Field    Cephalexin Rash    Cephalosporins Rash    Cortisone Rash    Keflex [Cephalexin Monohydrate] Rash  "   Penicillins Rash    Triamcinolone Rash     Pt reports rash starting on face then spreading to arms, legs, and truck a few days after a knee injection        Social History     Tobacco Use    Smoking status: Never     Passive exposure: Past    Smokeless tobacco: Never   Substance Use Topics    Alcohol use: Yes     Comment: once every several months       History   Drug Use No         Review of Systems    Review of Systems  CONSTITUTIONAL: NEGATIVE for fever, chills, change in weight  ENT/MOUTH: NEGATIVE for ear, mouth and throat problems  RESP: NEGATIVE for significant cough or SOB  CV: NEGATIVE for chest pain, palpitations or peripheral edema  HEME/ALLERGY/IMMUNE: Stable easy bruising no bleeding    Objective    /75 (BP Location: Right arm, Patient Position: Sitting, Cuff Size: Adult Regular)   Pulse 85   Temp 97.7  F (36.5  C) (Oral)   Resp 12   Ht 1.626 m (5' 4\")   Wt 61.6 kg (135 lb 12.8 oz)   LMP  (LMP Unknown)   SpO2 98%   BMI 23.31 kg/m     Estimated body mass index is 23.31 kg/m  as calculated from the following:    Height as of this encounter: 1.626 m (5' 4\").    Weight as of this encounter: 61.6 kg (135 lb 12.8 oz).  Physical Exam  Constitutional:       Appearance: Normal appearance.   HENT:      Head: Atraumatic.      Right Ear: Tympanic membrane normal.      Left Ear: Tympanic membrane normal.      Nose: Nose normal.      Mouth/Throat:      Mouth: Mucous membranes are moist.   Eyes:      Conjunctiva/sclera: Conjunctivae normal.   Cardiovascular:      Rate and Rhythm: Normal rate and regular rhythm.      Heart sounds: Normal heart sounds.   Pulmonary:      Effort: Pulmonary effort is normal.      Breath sounds: Normal breath sounds.   Abdominal:      General: Bowel sounds are normal.      Palpations: Abdomen is soft.   Musculoskeletal:      Cervical back: Neck supple.      Right lower leg: No edema.      Left lower leg: No edema.   Skin:     General: Skin is warm and dry.   Neurological: "      General: No focal deficit present.      Mental Status: She is alert.   Psychiatric:         Mood and Affect: Mood normal.           Recent Labs   Lab Test 06/30/23  0952 06/30/23  0930   HGB 15.1  --      --    NA  --  143   POTASSIUM  --  5.2   CR  --  0.92   A1C 5.4  --         Diagnostics  Labs pending at this time.  Results will be reviewed when available.   EKG shows atrial fibrillation with variable conduction, incomplete right bundle branch block, little change from previous  Revised Cardiac Risk Index (RCRI)  The patient has the following serious cardiovascular risks for perioperative complications:   - No serious cardiac risks = 0 points     RCRI Interpretation: 0 points: Class I (very low risk - 0.4% complication rate)         Signed Electronically by: Addi Angeles MD  Copy of this evaluation report is provided to requesting physician.

## 2024-05-01 ENCOUNTER — ANESTHESIA EVENT (OUTPATIENT)
Dept: SURGERY | Facility: CLINIC | Age: 79
End: 2024-05-01
Payer: COMMERCIAL

## 2024-05-01 ENCOUNTER — ANESTHESIA (OUTPATIENT)
Dept: SURGERY | Facility: CLINIC | Age: 79
End: 2024-05-01
Payer: COMMERCIAL

## 2024-05-01 ENCOUNTER — HOSPITAL ENCOUNTER (OUTPATIENT)
Facility: CLINIC | Age: 79
Discharge: HOME | End: 2024-05-01
Attending: COLON & RECTAL SURGERY | Admitting: COLON & RECTAL SURGERY
Payer: COMMERCIAL

## 2024-05-01 VITALS
DIASTOLIC BLOOD PRESSURE: 95 MMHG | WEIGHT: 135.9 LBS | BODY MASS INDEX: 23.2 KG/M2 | OXYGEN SATURATION: 100 % | TEMPERATURE: 97.9 F | SYSTOLIC BLOOD PRESSURE: 129 MMHG | RESPIRATION RATE: 18 BRPM | HEIGHT: 64 IN | HEART RATE: 112 BPM

## 2024-05-01 DIAGNOSIS — K60.30 ANAL FISTULA: Primary | ICD-10-CM

## 2024-05-01 LAB
ANION GAP SERPL CALCULATED.3IONS-SCNC: 8 MMOL/L (ref 7–15)
BUN SERPL-MCNC: 19.9 MG/DL (ref 8–23)
CALCIUM SERPL-MCNC: 9.2 MG/DL (ref 8.8–10.2)
CHLORIDE SERPL-SCNC: 100 MMOL/L (ref 98–107)
CREAT SERPL-MCNC: 0.81 MG/DL (ref 0.51–0.95)
DEPRECATED HCO3 PLAS-SCNC: 29 MMOL/L (ref 22–29)
EGFRCR SERPLBLD CKD-EPI 2021: 74 ML/MIN/1.73M2
GLUCOSE SERPL-MCNC: 91 MG/DL (ref 70–99)
POTASSIUM SERPL-SCNC: 4.2 MMOL/L (ref 3.4–5.3)
SODIUM SERPL-SCNC: 137 MMOL/L (ref 135–145)

## 2024-05-01 PROCEDURE — 258N000003 HC RX IP 258 OP 636: Performed by: ANESTHESIOLOGY

## 2024-05-01 PROCEDURE — 370N000017 HC ANESTHESIA TECHNICAL FEE, PER MIN: Performed by: COLON & RECTAL SURGERY

## 2024-05-01 PROCEDURE — 360N000075 HC SURGERY LEVEL 2, PER MIN: Performed by: COLON & RECTAL SURGERY

## 2024-05-01 PROCEDURE — 272N000001 HC OR GENERAL SUPPLY STERILE: Performed by: COLON & RECTAL SURGERY

## 2024-05-01 PROCEDURE — 250N000011 HC RX IP 250 OP 636: Performed by: NURSE ANESTHETIST, CERTIFIED REGISTERED

## 2024-05-01 PROCEDURE — 710N000012 HC RECOVERY PHASE 2, PER MINUTE: Performed by: COLON & RECTAL SURGERY

## 2024-05-01 PROCEDURE — 250N000011 HC RX IP 250 OP 636: Performed by: COLON & RECTAL SURGERY

## 2024-05-01 PROCEDURE — 250N000009 HC RX 250: Performed by: COLON & RECTAL SURGERY

## 2024-05-01 PROCEDURE — 999N000141 HC STATISTIC PRE-PROCEDURE NURSING ASSESSMENT: Performed by: COLON & RECTAL SURGERY

## 2024-05-01 RX ORDER — HYDROMORPHONE HCL IN WATER/PF 6 MG/30 ML
0.2 PATIENT CONTROLLED ANALGESIA SYRINGE INTRAVENOUS EVERY 5 MIN PRN
Status: DISCONTINUED | OUTPATIENT
Start: 2024-05-01 | End: 2024-05-01 | Stop reason: HOSPADM

## 2024-05-01 RX ORDER — OXYCODONE HYDROCHLORIDE 5 MG/1
5 TABLET ORAL EVERY 4 HOURS PRN
Status: DISCONTINUED | OUTPATIENT
Start: 2024-05-01 | End: 2024-05-01 | Stop reason: HOSPADM

## 2024-05-01 RX ORDER — ONDANSETRON 4 MG/1
4 TABLET, ORALLY DISINTEGRATING ORAL EVERY 30 MIN PRN
Status: DISCONTINUED | OUTPATIENT
Start: 2024-05-01 | End: 2024-05-01 | Stop reason: HOSPADM

## 2024-05-01 RX ORDER — NALOXONE HYDROCHLORIDE 0.4 MG/ML
0.1 INJECTION, SOLUTION INTRAMUSCULAR; INTRAVENOUS; SUBCUTANEOUS
Status: DISCONTINUED | OUTPATIENT
Start: 2024-05-01 | End: 2024-05-01 | Stop reason: HOSPADM

## 2024-05-01 RX ORDER — DEXAMETHASONE SODIUM PHOSPHATE 4 MG/ML
4 INJECTION, SOLUTION INTRA-ARTICULAR; INTRALESIONAL; INTRAMUSCULAR; INTRAVENOUS; SOFT TISSUE
Status: DISCONTINUED | OUTPATIENT
Start: 2024-05-01 | End: 2024-05-01 | Stop reason: HOSPADM

## 2024-05-01 RX ORDER — ACETAMINOPHEN 325 MG/1
975 TABLET ORAL ONCE
Status: DISCONTINUED | OUTPATIENT
Start: 2024-05-01 | End: 2024-05-01 | Stop reason: HOSPADM

## 2024-05-01 RX ORDER — BUPIVACAINE HYDROCHLORIDE AND EPINEPHRINE 2.5; 5 MG/ML; UG/ML
0-30 INJECTION, SOLUTION EPIDURAL; INFILTRATION; INTRACAUDAL; PERINEURAL ONCE
Status: DISCONTINUED | OUTPATIENT
Start: 2024-05-01 | End: 2024-05-01 | Stop reason: HOSPADM

## 2024-05-01 RX ORDER — SODIUM CHLORIDE, SODIUM LACTATE, POTASSIUM CHLORIDE, CALCIUM CHLORIDE 600; 310; 30; 20 MG/100ML; MG/100ML; MG/100ML; MG/100ML
INJECTION, SOLUTION INTRAVENOUS CONTINUOUS
Status: DISCONTINUED | OUTPATIENT
Start: 2024-05-01 | End: 2024-05-01 | Stop reason: HOSPADM

## 2024-05-01 RX ORDER — BUPIVACAINE HYDROCHLORIDE 2.5 MG/ML
INJECTION, SOLUTION INFILTRATION; PERINEURAL PRN
Status: DISCONTINUED | OUTPATIENT
Start: 2024-05-01 | End: 2024-05-01 | Stop reason: HOSPADM

## 2024-05-01 RX ORDER — METRONIDAZOLE 500 MG/1
500 TABLET ORAL 3 TIMES DAILY
COMMUNITY
Start: 2024-04-16 | End: 2024-08-05

## 2024-05-01 RX ORDER — FENTANYL CITRATE 50 UG/ML
50 INJECTION, SOLUTION INTRAMUSCULAR; INTRAVENOUS EVERY 5 MIN PRN
Status: DISCONTINUED | OUTPATIENT
Start: 2024-05-01 | End: 2024-05-01 | Stop reason: HOSPADM

## 2024-05-01 RX ORDER — ONDANSETRON 2 MG/ML
4 INJECTION INTRAMUSCULAR; INTRAVENOUS EVERY 30 MIN PRN
Status: DISCONTINUED | OUTPATIENT
Start: 2024-05-01 | End: 2024-05-01 | Stop reason: HOSPADM

## 2024-05-01 RX ORDER — OXYCODONE HYDROCHLORIDE 5 MG/1
5 TABLET ORAL
Status: DISCONTINUED | OUTPATIENT
Start: 2024-05-01 | End: 2024-05-01 | Stop reason: HOSPADM

## 2024-05-01 RX ORDER — OXYCODONE HYDROCHLORIDE 5 MG/1
5 TABLET ORAL EVERY 4 HOURS PRN
Qty: 6 TABLET | Refills: 0 | Status: SHIPPED | OUTPATIENT
Start: 2024-05-01 | End: 2024-08-05

## 2024-05-01 RX ORDER — LIDOCAINE 40 MG/G
CREAM TOPICAL
Status: DISCONTINUED | OUTPATIENT
Start: 2024-05-01 | End: 2024-05-01 | Stop reason: HOSPADM

## 2024-05-01 RX ORDER — MEPERIDINE HYDROCHLORIDE 25 MG/ML
12.5 INJECTION INTRAMUSCULAR; INTRAVENOUS; SUBCUTANEOUS EVERY 5 MIN PRN
Status: DISCONTINUED | OUTPATIENT
Start: 2024-05-01 | End: 2024-05-01 | Stop reason: HOSPADM

## 2024-05-01 RX ORDER — LABETALOL HYDROCHLORIDE 5 MG/ML
10 INJECTION, SOLUTION INTRAVENOUS EVERY 10 MIN PRN
Status: DISCONTINUED | OUTPATIENT
Start: 2024-05-01 | End: 2024-05-01 | Stop reason: HOSPADM

## 2024-05-01 RX ORDER — HYDROMORPHONE HCL IN WATER/PF 6 MG/30 ML
0.4 PATIENT CONTROLLED ANALGESIA SYRINGE INTRAVENOUS EVERY 5 MIN PRN
Status: DISCONTINUED | OUTPATIENT
Start: 2024-05-01 | End: 2024-05-01 | Stop reason: HOSPADM

## 2024-05-01 RX ORDER — DIAZEPAM 10 MG/2ML
2.5 INJECTION, SOLUTION INTRAMUSCULAR; INTRAVENOUS
Status: DISCONTINUED | OUTPATIENT
Start: 2024-05-01 | End: 2024-05-01 | Stop reason: HOSPADM

## 2024-05-01 RX ORDER — ONDANSETRON 2 MG/ML
INJECTION INTRAMUSCULAR; INTRAVENOUS PRN
Status: DISCONTINUED | OUTPATIENT
Start: 2024-05-01 | End: 2024-05-01

## 2024-05-01 RX ORDER — OXYCODONE HYDROCHLORIDE 5 MG/1
10 TABLET ORAL EVERY 4 HOURS PRN
Status: DISCONTINUED | OUTPATIENT
Start: 2024-05-01 | End: 2024-05-01 | Stop reason: HOSPADM

## 2024-05-01 RX ORDER — FENTANYL CITRATE 50 UG/ML
INJECTION, SOLUTION INTRAMUSCULAR; INTRAVENOUS PRN
Status: DISCONTINUED | OUTPATIENT
Start: 2024-05-01 | End: 2024-05-01

## 2024-05-01 RX ORDER — ALBUTEROL SULFATE 0.83 MG/ML
2.5 SOLUTION RESPIRATORY (INHALATION) EVERY 4 HOURS PRN
Status: DISCONTINUED | OUTPATIENT
Start: 2024-05-01 | End: 2024-05-01 | Stop reason: HOSPADM

## 2024-05-01 RX ORDER — PROPOFOL 10 MG/ML
INJECTION, EMULSION INTRAVENOUS CONTINUOUS PRN
Status: DISCONTINUED | OUTPATIENT
Start: 2024-05-01 | End: 2024-05-01

## 2024-05-01 RX ORDER — FENTANYL CITRATE 50 UG/ML
25 INJECTION, SOLUTION INTRAMUSCULAR; INTRAVENOUS
Status: DISCONTINUED | OUTPATIENT
Start: 2024-05-01 | End: 2024-05-01 | Stop reason: HOSPADM

## 2024-05-01 RX ADMIN — SODIUM CHLORIDE, POTASSIUM CHLORIDE, SODIUM LACTATE AND CALCIUM CHLORIDE: 600; 310; 30; 20 INJECTION, SOLUTION INTRAVENOUS at 14:46

## 2024-05-01 RX ADMIN — FENTANYL CITRATE 50 MCG: 50 INJECTION INTRAMUSCULAR; INTRAVENOUS at 15:13

## 2024-05-01 RX ADMIN — ONDANSETRON 4 MG: 2 INJECTION INTRAMUSCULAR; INTRAVENOUS at 15:11

## 2024-05-01 RX ADMIN — SODIUM CHLORIDE, POTASSIUM CHLORIDE, SODIUM LACTATE AND CALCIUM CHLORIDE: 600; 310; 30; 20 INJECTION, SOLUTION INTRAVENOUS at 14:50

## 2024-05-01 RX ADMIN — PROPOFOL 100 MCG/KG/MIN: 10 INJECTION, EMULSION INTRAVENOUS at 15:13

## 2024-05-01 ASSESSMENT — ACTIVITIES OF DAILY LIVING (ADL)
ADLS_ACUITY_SCORE: 31
ADLS_ACUITY_SCORE: 32
ADLS_ACUITY_SCORE: 31
ADLS_ACUITY_SCORE: 31

## 2024-05-01 ASSESSMENT — ENCOUNTER SYMPTOMS: DYSRHYTHMIAS: 1

## 2024-05-01 NOTE — BRIEF OP NOTE
[facility]    Brief Operative Note    Pre-operative diagnosis: Perirectal abscess [K61.1]  Post-operative diagnosis anal fistula with abscess    Procedure: Exam under anesthesia of the rectum, N/A - Rectum  drainage of abcess, revision of seton, N/A - Anus    Surgeon: Surgeons and Role:     * Bonnie Rodriguez MD - Primary     * Edgar Oscar MD - Fellow - Assisting  Anesthesia: MAC   Estimated Blood Loss: 2 mL    Drains: seton  Specimens: * No specimens in log *  Findings:   Prior seton in place exchanged, and external opening enlarged and curetted.  .  Complications: None.  Implants: * No implants in log *

## 2024-05-01 NOTE — ANESTHESIA POSTPROCEDURE EVALUATION
Patient: Shaina Calixto    Procedure: Procedure(s):  Exam under anesthesia of the rectum  drainage of abcess, revision of seton       Anesthesia Type:  MAC    Note:     Postop Pain Control: Uneventful            Sign Out: Well controlled pain   PONV: No   Neuro/Psych: Uneventful            Sign Out: Acceptable/Baseline neuro status   Airway/Respiratory: Uneventful            Sign Out: Acceptable/Baseline resp. status   CV/Hemodynamics: Uneventful            Sign Out: Acceptable CV status   Other NRE: NONE   DID A NON-ROUTINE EVENT OCCUR? No           Last vitals:  Vitals Value Taken Time   /76 05/01/24 1600   Temp 98.3  F (36.8  C) 05/01/24 1600   Pulse 40 05/01/24 1600   Resp 16 05/01/24 1600   SpO2 99 % 05/01/24 1604   Vitals shown include unfiled device data.    Electronically Signed By: Holger Fisher MD  May 1, 2024  4:09 PM

## 2024-05-01 NOTE — ANESTHESIA CARE TRANSFER NOTE
Patient: Shaina Calixto    Procedure: Procedure(s):  Exam under anesthesia of the rectum  drainage of abcess, revision of seton       Diagnosis: Perirectal abscess [K61.1]  Diagnosis Additional Information: No value filed.    Anesthesia Type:   MAC     Note:    Oropharynx: oropharynx clear of all foreign objects  Level of Consciousness: awake  Oxygen Supplementation: room air    Independent Airway: airway patency satisfactory and stable  Dentition: dentition unchanged  Vital Signs Stable: post-procedure vital signs reviewed and stable  Report to RN Given: handoff report given  Patient transferred to: Phase II    Handoff Report: Identifed the Patient, Identified the Reponsible Provider, Reviewed the pertinent medical history, Discussed the surgical course, Reviewed Intra-OP anesthesia mangement and issues during anesthesia, Set expectations for post-procedure period and Allowed opportunity for questions and acknowledgement of understanding      Vitals:  Vitals Value Taken Time   BP     Temp     Pulse     Resp     SpO2         Electronically Signed By: JULES Jimenez CRNA  May 1, 2024  3:46 PM

## 2024-05-01 NOTE — ASSESSMENT & PLAN NOTE
GFR Estimate   Date Value Ref Range Status   06/30/2023 64 >60 mL/min/1.73m2 Final   11/19/2020 60 (L) >60 mL/min/[1.73_m2] Final     Comment:     Non  GFR Calc  Starting 12/18/2018, serum creatinine based estimated GFR (eGFR) will be   calculated using the Chronic Kidney Disease Epidemiology Collaboration   (CKD-EPI) equation.     No ACE/ARB

## 2024-05-01 NOTE — OP NOTE
OPERATIVE NOTE    PERIOPERATIVE DIAGNOSIS: Anal fistula with seton in place, now with adjacent abscess    POSTOPERATIVE DIAGNOSIS:  same     PROCEDURE: Exam under anesthesia, drainage of abscess, revision of seton to a teardrop configuration     SURGEON:  Bonnie Rodriguez MD    CO-SURGEON: Dr. Oscar, [facility] colorectal surgery fellow     ANESTHESIA: Monitored anesthesia care, +20 cc of lidocaine and 30 cc of quarter percent Marcaine     INDICATIONS FOR PROCEDURE: Shaina is a 78-year-old woman with chronic erratic bowel function.  She was found to have a fistula and a seton was placed many years ago.  This subsequently fell out and she was doing well without the seton but in October had an abscess.  A seton was replaced.  She was doing okay but about 4 weeks ago had an abscess adjacent to the seton and this was drained in the office and she was treated with antibiotics.  She came back to see me earlier in the week with recurrent irritation and purulent drainage from the external opening that seem to be trapping.  I recommended an exam under anesthesia, drainage of abscess, and possible revision of her seton.  We reviewed the risk of bleeding, infection, alteration of bowel control, need for further procedures, and expected recovery.  She understood and wished to proceed.     FINDINGS: There was a 2 cm area of induration and erythema just lateral to the external opening in her left anterior quadrant.  The seton itself was well-positioned.  There was some chronic inflammatory changes and scarring within the anal canal consistent with her frequent digitation.  There is no evidence of stenosis, or other undrained sepsis.  The old seton was removed and replaced with a red vessel loop and a teardrop configuration.  The external opening was enlarged and the cavity was curetted free of debris and point cautery was used to control bleeding.     DESCRIPTION OF PROCEDURE: After informed consent was obtained patient was  taken the operating position on the operating table in the prone jackknife position and she was sedated.  Perianal region was taped prepped and draped in usual fashion.  After appropriate timeout we began by injecting the above local.  Digital rectal exam revealed the above findings with some scarring but no obvious stenosis.  The internal opening is in the mid cephalad aspect of the anal canal encompassing about 70 to 75% of the sphincter complex.  Fistula probe was guided into the external opening and there was a cavity in the subcutaneous area but no additional hyaline tracts towards the anal canal.  A silk suture was tied to the yellow vessel loop which was drawn through.  The yellow vessel loop was then cut and a red vessel loop was drawn through.  This was secured to itself in 4 places in a teardrop configuration to help minimize the rotating inward that she has been having with her current seton that may be contributing to trapping of this pocket and abscess around the seton.    The external opening was then enlarged and the tract was curetted free of debris.  Point cautery was used to control bleeding that was minimal.  Dry gauze dressing was placed externally.    Estimated blood loss: Less than 5 cc     COMPLICATIONS: No immediate complication    SPECIMENS: None     Bonnie Rodriguez MD

## 2024-05-01 NOTE — ANESTHESIA PREPROCEDURE EVALUATION
Anesthesia Pre-Procedure Evaluation    Patient: Shaian Calixto   MRN: [id number] : 1945        Procedure : Procedure(s):  Exam under anesthesia of the rectum  drainage of abcess          Past Medical History:   Diagnosis Date    Ankylosing spondylitis (H)      HLAB19 +    Atrial fibrillation, persistent (H) 10/15/2021    CKD (chronic kidney disease), stage II 2022    Complication of anesthesia     Depressive disorder college, young adult    resolved for many years    Eosinophilic cellulitis     Mild major depression (H24)     Osteopenia     -2.1; Lumbar -2.5; Fem Neck     Sleep apnea     Has a Mouth appliance    Small intestinal bacterial overgrowth 2017    has had for years but finally diagnosed correctly May 2017    Vitamin deficiency       Past Surgical History:   Procedure Laterality Date    ARTHROSCOPY KNEE RT/LT Right     BIOPSY  Dec. 2016    spongiotic dermatitis    CARDIAC SURGERY  2022    cardioversion     COLONOSCOPY  May 2015    normal    EXAM UNDER ANESTHESIA RECTUM N/A 3/15/2022    Procedure: Exam under anesthesia, biopsy of external lesion and placement of seton;  Surgeon: Bonnie Rodriguez MD;  Location: [facility]    FOOT SURGERY Right     due to foot injury    FOOT SURGERY Right     ayers's neuroma    LASER YAG CAPSULOTOMY Bilateral 12/29/15    PHACOEMULSIFICATION CLEAR CORNEA WITH STANDARD INTRAOCULAR LENS IMPLANT Right 10/1/14    PHACOEMULSIFICATION CLEAR CORNEA WITH STANDARD INTRAOCULAR LENS IMPLANT Left 10/08/14    SOFT TISSUE SURGERY      multiple crush & fx's to right foot      Allergies   Allergen Reactions    Floraquin [Iodoquinol] Anaphylaxis     LOST SENSE OF SMELL AND TASTE    Quinolones      Other reaction(s): Other - Describe In Comment Field  Lost sense of smell.  Pt would prefer to not take any of the medications in Fluoroquninolone group of antibiotics.  Other reaction(s): Other (see comments)  Lost sense of smell.  Pt would prefer to not take  any of the medications in Fluoroquninolone group of antibiotics.  Other reaction(s): Other - Describe In Comment Field    Cephalexin Rash    Cephalosporins Rash    Cortisone Rash    Keflex [Cephalexin Monohydrate] Rash    Penicillins Rash    Triamcinolone Rash     Pt reports rash starting on face then spreading to arms, legs, and truck a few days after a knee injection      Social History     Tobacco Use    Smoking status: Never     Passive exposure: Past    Smokeless tobacco: Never   Substance Use Topics    Alcohol use: Yes     Comment: once every several months      Wt Readings from Last 1 Encounters:   05/01/24 61.6 kg (135 lb 14.4 oz)        Anesthesia Evaluation   Pt has had prior anesthetic. Type: General.    No history of anesthetic complications       ROS/MED HX  ENT/Pulmonary:     (+) sleep apnea, doesn't use CPAP,                                      Neurologic:  - neg neurologic ROS     Cardiovascular:     (+)  - -   -  - -                        dysrhythmias, a-fib,             METS/Exercise Tolerance:     Hematologic:  - neg hematologic  ROS     Musculoskeletal:  - neg musculoskeletal ROS     GI/Hepatic:  - neg GI/hepatic ROS     Renal/Genitourinary:     (+) renal disease, type: CRI,            Endo:  - neg endo ROS     Psychiatric/Substance Use:     (+) psychiatric history depression       Infectious Disease:  - neg infectious disease ROS     Malignancy:  - neg malignancy ROS     Other:  - neg other ROS          Physical Exam    Airway        Mallampati: II   TM distance: > 3 FB   Neck ROM: full   Mouth opening: > 3 cm    Respiratory Devices and Support         Dental  no notable dental history   Comment: Loose, lower bridge, #8 implant with bridge    (+) Multiple visibly decayed, broken teeth      Cardiovascular   cardiovascular exam normal          Pulmonary   pulmonary exam normal                OUTSIDE LABS:  CBC:   Lab Results   Component Value Date    WBC 8.8 04/30/2024    WBC 5.7 06/30/2023     "HGB 14.9 04/30/2024    HGB 15.1 06/30/2023    HCT 43.6 04/30/2024    HCT 44.1 06/30/2023     04/30/2024     06/30/2023     BMP:   Lab Results   Component Value Date     04/30/2024     06/30/2023    POTASSIUM 4.2 04/30/2024    POTASSIUM 5.2 06/30/2023    CHLORIDE 100 04/30/2024    CHLORIDE 105 06/30/2023    CO2 29 04/30/2024    CO2 26 06/30/2023    BUN 19.9 04/30/2024    BUN 24.3 (H) 06/30/2023    CR 0.81 04/30/2024    CR 0.92 06/30/2023    GLC 91 04/30/2024    GLC 93 06/30/2023     COAGS: No results found for: \"PTT\", \"INR\", \"FIBR\"  POC: No results found for: \"BGM\", \"HCG\", \"HCGS\"  HEPATIC:   Lab Results   Component Value Date    ALBUMIN 4.2 06/30/2023    PROTTOTAL 6.7 06/30/2023    ALT 19 06/30/2023    AST 27 06/30/2023    ALKPHOS 99 06/30/2023    BILITOTAL 0.5 06/30/2023     OTHER:   Lab Results   Component Value Date    A1C 5.4 06/30/2023    TOM 9.2 04/30/2024    MAG 2.2 02/06/2020    TSH 0.98 06/30/2023    T4 0.95 11/19/2020    CRP <2.9 11/19/2020       Anesthesia Plan    ASA Status:  3    NPO Status:  NPO Appropriate    Anesthesia Type: MAC.              Consents    Anesthesia Plan(s) and associated risks, benefits, and realistic alternatives discussed. Questions answered and patient/representative(s) expressed understanding.     - Discussed:     - Discussed with:  Patient            Postoperative Care    Pain management: IV analgesics, Oral pain medications, Multi-modal analgesia.   PONV prophylaxis: Ondansetron (or other 5HT-3), Dexamethasone or Solumedrol     Comments:               Holger Fisher MD    I have reviewed the pertinent notes and labs in the chart from the past 30 days and (re)examined the patient.  Any updates or changes from those notes are reflected in this note.            # Drug Induced Coagulation Defect: home medication list includes an anticoagulant medication       "

## 2024-05-05 ENCOUNTER — TELEPHONE (OUTPATIENT)
Dept: SURGERY | Facility: CLINIC | Age: 79
End: 2024-05-05
Payer: COMMERCIAL

## 2024-05-05 ENCOUNTER — HOSPITAL ENCOUNTER (EMERGENCY)
Facility: CLINIC | Age: 79
Discharge: HOME | End: 2024-05-05
Attending: EMERGENCY MEDICINE | Admitting: EMERGENCY MEDICINE
Payer: COMMERCIAL

## 2024-05-05 VITALS
RESPIRATION RATE: 18 BRPM | SYSTOLIC BLOOD PRESSURE: 141 MMHG | TEMPERATURE: 98.3 F | DIASTOLIC BLOOD PRESSURE: 101 MMHG | OXYGEN SATURATION: 100 % | HEART RATE: 85 BPM

## 2024-05-05 DIAGNOSIS — T14.8XXA BLEEDING FROM WOUND: ICD-10-CM

## 2024-05-05 LAB
ABO/RH(D): NORMAL
ANION GAP SERPL CALCULATED.3IONS-SCNC: 14 MMOL/L (ref 7–15)
ANTIBODY SCREEN: NEGATIVE
BASOPHILS # BLD AUTO: 0 10E3/UL (ref 0–0.2)
BASOPHILS NFR BLD AUTO: 0 %
BUN SERPL-MCNC: 27.6 MG/DL (ref 8–23)
CALCIUM SERPL-MCNC: 9.4 MG/DL (ref 8.8–10.2)
CHLORIDE SERPL-SCNC: 96 MMOL/L (ref 98–107)
CREAT SERPL-MCNC: 0.85 MG/DL (ref 0.51–0.95)
DEPRECATED HCO3 PLAS-SCNC: 21 MMOL/L (ref 22–29)
EGFRCR SERPLBLD CKD-EPI 2021: 70 ML/MIN/1.73M2
EOSINOPHIL # BLD AUTO: 0.1 10E3/UL (ref 0–0.7)
EOSINOPHIL NFR BLD AUTO: 3 %
ERYTHROCYTE [DISTWIDTH] IN BLOOD BY AUTOMATED COUNT: 13.2 % (ref 10–15)
GLUCOSE SERPL-MCNC: 96 MG/DL (ref 70–99)
HCT VFR BLD AUTO: 45.4 % (ref 35–47)
HGB BLD-MCNC: 15.5 G/DL (ref 11.7–15.7)
HOLD SPECIMEN: NORMAL
HOLD SPECIMEN: NORMAL
IMM GRANULOCYTES # BLD: 0 10E3/UL
IMM GRANULOCYTES NFR BLD: 0 %
LYMPHOCYTES # BLD AUTO: 0.9 10E3/UL (ref 0.8–5.3)
LYMPHOCYTES NFR BLD AUTO: 21 %
MCH RBC QN AUTO: 33.8 PG (ref 26.5–33)
MCHC RBC AUTO-ENTMCNC: 34.1 G/DL (ref 31.5–36.5)
MCV RBC AUTO: 99 FL (ref 78–100)
MONOCYTES # BLD AUTO: 0.4 10E3/UL (ref 0–1.3)
MONOCYTES NFR BLD AUTO: 10 %
NEUTROPHILS # BLD AUTO: 2.8 10E3/UL (ref 1.6–8.3)
NEUTROPHILS NFR BLD AUTO: 66 %
NRBC # BLD AUTO: 0 10E3/UL
NRBC BLD AUTO-RTO: 0 /100
PLATELET # BLD AUTO: 248 10E3/UL (ref 150–450)
POTASSIUM SERPL-SCNC: 4.1 MMOL/L (ref 3.4–5.3)
RBC # BLD AUTO: 4.59 10E6/UL (ref 3.8–5.2)
SODIUM SERPL-SCNC: 131 MMOL/L (ref 135–145)
SPECIMEN EXPIRATION DATE: NORMAL
WBC # BLD AUTO: 4.2 10E3/UL (ref 4–11)

## 2024-05-05 PROCEDURE — 99283 EMERGENCY DEPT VISIT LOW MDM: CPT

## 2024-05-05 PROCEDURE — 80048 BASIC METABOLIC PNL TOTAL CA: CPT | Performed by: EMERGENCY MEDICINE

## 2024-05-05 PROCEDURE — 86900 BLOOD TYPING SEROLOGIC ABO: CPT | Performed by: EMERGENCY MEDICINE

## 2024-05-05 PROCEDURE — 85025 COMPLETE CBC W/AUTO DIFF WBC: CPT | Performed by: EMERGENCY MEDICINE

## 2024-05-05 PROCEDURE — 36415 COLL VENOUS BLD VENIPUNCTURE: CPT | Performed by: EMERGENCY MEDICINE

## 2024-05-05 ASSESSMENT — COLUMBIA-SUICIDE SEVERITY RATING SCALE - C-SSRS
2. HAVE YOU ACTUALLY HAD ANY THOUGHTS OF KILLING YOURSELF IN THE PAST MONTH?: NO
1. IN THE PAST MONTH, HAVE YOU WISHED YOU WERE DEAD OR WISHED YOU COULD GO TO SLEEP AND NOT WAKE UP?: NO
6. HAVE YOU EVER DONE ANYTHING, STARTED TO DO ANYTHING, OR PREPARED TO DO ANYTHING TO END YOUR LIFE?: NO

## 2024-05-05 ASSESSMENT — ACTIVITIES OF DAILY LIVING (ADL)
ADLS_ACUITY_SCORE: 36
ADLS_ACUITY_SCORE: 36

## 2024-05-06 NOTE — ED PROVIDER NOTES
Emergency Department Note      History of Present Illness     Chief Complaint  Rectal Bleeding    HPI  Shaina Calixto is a 78 year old female presented with rectal bleeding.  Recent medical history notable for colorectal surgery performed on May 1, 2024.  She underwent drainage of perianal abscess and revision of a seton to a teardrop configuration.  She had had a previous fistula and seton placed many years ago which eventually fell out and she has developed problems with abscesses.    Independent Historian  None    Review of External Notes  I reviewed the operative note by Dr. Bonnie Rodriguez describing the surgical procedure on May 1.  Past Medical History   Medical History and Problem List  Past Medical History:   Diagnosis Date    Ankylosing spondylitis (H)     Atrial fibrillation, persistent (H) 10/15/2021    CKD (chronic kidney disease), stage II 01/07/2022    Complication of anesthesia     Depressive disorder [facility], young adult    Eosinophilic cellulitis     Mild major depression (H24)     Osteopenia     Sleep apnea     Small intestinal bacterial overgrowth 05/2017    Vitamin deficiency        Medications  apixaban ANTICOAGULANT (ELIQUIS) 5 MG tablet  CARTIA  MG 24 hr capsule  cholecalciferol (VITAMIN D3) 125 MCG (5000 UT) TABS tablet  metroNIDAZOLE (FLAGYL) 500 MG tablet  oxyCODONE (ROXICODONE) 5 MG tablet  UNABLE TO FIND        Surgical History   Past Surgical History:   Procedure Laterality Date    ARTHROSCOPY KNEE RT/LT Right 2004    BIOPSY  Dec. 2016    spongiotic dermatitis    CARDIAC SURGERY  01/24/2022    cardioversion     COLONOSCOPY  May 2015    normal    EXAM UNDER ANESTHESIA RECTUM N/A 3/15/2022    Procedure: Exam under anesthesia, biopsy of external lesion and placement of seton;  Surgeon: Bonnie Rodriguez MD;  Location: [facility]    EXAM UNDER ANESTHESIA RECTUM N/A 5/1/2024    Procedure: Exam under anesthesia, drainage of abscess, revision of seton to a teardrop configuration;  Surgeon:  Bonnie Rodriguez MD;  Location: [facility]    FOOT SURGERY Right 1995    due to foot injury    FOOT SURGERY Right 2000    ayers's neuroma    INCISION AND DRAINAGE RECTUM, COMBINED N/A 5/1/2024    Procedure: drainage of abcess, revision of seton;  Surgeon: Bonnie Rodriguez MD;  Location: [facility]    LASER YAG CAPSULOTOMY Bilateral 12/29/15    PHACOEMULSIFICATION CLEAR CORNEA WITH STANDARD INTRAOCULAR LENS IMPLANT Right 10/1/14    PHACOEMULSIFICATION CLEAR CORNEA WITH STANDARD INTRAOCULAR LENS IMPLANT Left 10/08/14    PLACEMENT OF SETON RECTUM N/A 5/1/2024    Procedure: PLACEMENT OF SETON RECTUM;  Surgeon: Bonnie Rodriguez MD;  Location: [facility]    SOFT TISSUE SURGERY  1995    multiple crush & fx's to right foot     Physical Exam   Patient Vitals for the past 24 hrs:   BP Temp Temp src Pulse Resp SpO2   05/05/24 2143 (!) 141/101 -- -- -- -- 100 %   05/05/24 2131 -- -- -- -- -- 100 %   05/05/24 2130 (!) 142/104 -- -- 85 -- 99 %   05/05/24 2113 (!) 147/107 -- -- -- -- 100 %   05/05/24 2051 102/70 98.3  F (36.8  C) Oral 83 18 100 %     Physical Exam  General: Patient is alert and cooperative.  Eyes: EOMI. Normal conjunctiva.  Neck:  Normal range of motion and appearance.   Cardiovascular:  Normal rate.   Pulmonary/Chest:  Effort normal.   Abdominal: Soft. No distension or tenderness.      Perianal area: small amount of non-pulsatile bleeding at 9 o'clock position, adjacent to fistula/seton.    Musculoskeletal: Normal range of motion. No edema or tenderness.   Neurological: oriented, normal strength, sensation, and coordination.   Psychiatric: Normal mood and affect. Normal behavior and judgement.    Diagnostics   Lab Results   Labs Ordered and Resulted from Time of ED Arrival to Time of ED Departure   BASIC METABOLIC PANEL - Abnormal       Result Value    Sodium 131 (*)     Potassium 4.1      Chloride 96 (*)     Carbon Dioxide (CO2) 21 (*)     Anion Gap 14      Urea Nitrogen 27.6 (*)     Creatinine 0.85      GFR  Estimate 70      Calcium 9.4      Glucose 96     CBC WITH PLATELETS AND DIFFERENTIAL - Abnormal    WBC Count 4.2      RBC Count 4.59      Hemoglobin 15.5      Hematocrit 45.4      MCV 99      MCH 33.8 (*)     MCHC 34.1      RDW 13.2      Platelet Count 248      % Neutrophils 66      % Lymphocytes 21      % Monocytes 10      % Eosinophils 3      % Basophils 0      % Immature Granulocytes 0      NRBCs per 100 WBC 0      Absolute Neutrophils 2.8      Absolute Lymphocytes 0.9      Absolute Monocytes 0.4      Absolute Eosinophils 0.1      Absolute Basophils 0.0      Absolute Immature Granulocytes 0.0      Absolute NRBCs 0.0     TYPE AND SCREEN, ADULT    ABO/RH(D) O POS      Antibody Screen Negative      SPECIMEN EXPIRATION DATE [id number]     ABO/RH TYPE AND SCREEN       Imaging  No orders to display       ED Course    Medications Administered  Medications - No data to display    Procedures  Procedures   Surgifoam, gauze, pressure applied with control of bleeding.    Discussion of Management  RN spoke with colorectal surgeon on call, who had been aware of patient's bleeding, ED visit; discussed treatment, plan for discharge.     Social Determinants of Health adding to complexity of care  None    ED Course  I reviewed the patient's medical record.   The patient was seen and examined by myself. I discussed the course of care with the patient including laboratory.  she understands and is agreeable to the plan.  Medical Decision Making / Diagnosis     MIPS     None    MDM  Shaina Calixto is a 78 year old female s/p 5/1 perianal abscess drainage and seton revision with acute bleeding; on eliquis for afib, which has been resumed.  Hemodynamically stable, normal hgb.  Exam shows nonpulsatile bleeding adjacent to seton, no rectal bleeding.  Controlled with surgifoam, guaze, pressure.  Advise she hold eliquis x 2 d, follow up for wound check with colorectal.    Disposition  The patient was discharged.     ICD-10 Codes:     ICD-10-CM    1. Bleeding from wound  T14.8XXA            Discharge Medications  Discharge Medication List as of 5/5/2024 10:21 PM            [employer]        Addi Crum MD  05/06/24 [id number]

## 2024-05-06 NOTE — TELEPHONE ENCOUNTER
Spoke to patient over the phone. She is s/p seton placement and abscess I&D with Dr. Rodriguez last Wednesday. On Friday, she restarted her eliquis without any issues. Earlier this evening, she began to bleed from the incision site, which sounds like it was rather profuse, to the extent that it was running down her leg and saturating a pad. She does not note any inciting event that caused the bleeding. Her last BM was this morning. I gave her instructions to try to pack the incision and apply pressure for 10 minutes, which she tried, but she just informed me that she will need to go to the ER to have it evaluated.      Jolly Kim MD Fellow  Colon and Rectal Surgery

## 2024-05-06 NOTE — ED TRIAGE NOTES
"Patient had surgery on rectum on Thursday. Patient had bleeding beginning 1800 this evening. Patient is on blood thinners restarted on Friday morning. Patient states she is bleeding through \"a pad every 15 mins\". Patient denies pain lightheadedness or dizziness.        "

## 2024-05-22 ENCOUNTER — LAB (OUTPATIENT)
Dept: LAB | Facility: CLINIC | Age: 79
End: 2024-05-22
Payer: COMMERCIAL

## 2024-05-22 ENCOUNTER — HOSPITAL ENCOUNTER (OUTPATIENT)
Dept: ULTRASOUND IMAGING | Facility: CLINIC | Age: 79
Discharge: HOME | End: 2024-05-22
Attending: NURSE PRACTITIONER | Admitting: NURSE PRACTITIONER
Payer: COMMERCIAL

## 2024-05-22 DIAGNOSIS — Z01.89 ENCOUNTER FOR LABORATORY TEST: Primary | ICD-10-CM

## 2024-05-22 DIAGNOSIS — Z09 ENCOUNTER FOR FOLLOW-UP EXAMINATION AFTER COMPLETED TREATMENT FOR CONDITIONS OTHER THAN MALIGNANT NEOPLASM: ICD-10-CM

## 2024-05-22 DIAGNOSIS — R68.89 OTHER GENERAL SYMPTOMS AND SIGNS: ICD-10-CM

## 2024-05-22 DIAGNOSIS — E04.1 THYROID NODULE: ICD-10-CM

## 2024-05-22 LAB
AMYLASE SERPL-CCNC: 68 U/L (ref 28–100)
CRP SERPL HS-MCNC: 0.81 MG/L
D DIMER PPP FEU-MCNC: 0.43 UG/ML FEU (ref 0–0.5)
DHEA-S SERPL-MCNC: <15 UG/DL (ref 35–430)
ERYTHROCYTE [SEDIMENTATION RATE] IN BLOOD BY WESTERGREN METHOD: 6 MM/HR (ref 0–30)
GGT SERPL-CCNC: 25 U/L (ref 5–36)
HCYS SERPL-SCNC: 9.5 UMOL/L (ref 0–15)
INSULIN SERPL-ACNC: 5 UU/ML (ref 2.6–24.9)
LDH SERPL L TO P-CCNC: 257 U/L (ref 0–250)
LIPASE SERPL-CCNC: 35 U/L (ref 13–60)
SHBG SERPL-SCNC: 72 NMOL/L (ref 30–135)
T3 SERPL-MCNC: 89 NG/DL (ref 85–202)
T3FREE SERPL-MCNC: 2.7 PG/ML (ref 2–4.4)
T4 FREE SERPL-MCNC: 1.06 NG/DL (ref 0.9–1.7)
THYROGLOB AB SERPL IA-ACNC: <20 IU/ML
THYROPEROXIDASE AB SERPL-ACNC: <10 IU/ML
TSH SERPL DL<=0.005 MIU/L-ACNC: 1.15 UIU/ML (ref 0.3–4.2)
URATE SERPL-MCNC: 5 MG/DL (ref 2.4–5.7)

## 2024-05-22 PROCEDURE — 84482 T3 REVERSE: CPT | Mod: 90

## 2024-05-22 PROCEDURE — 84550 ASSAY OF BLOOD/URIC ACID: CPT

## 2024-05-22 PROCEDURE — 82785 ASSAY OF IGE: CPT

## 2024-05-22 PROCEDURE — 84443 ASSAY THYROID STIM HORMONE: CPT

## 2024-05-22 PROCEDURE — 83525 ASSAY OF INSULIN: CPT

## 2024-05-22 PROCEDURE — 85379 FIBRIN DEGRADATION QUANT: CPT

## 2024-05-22 PROCEDURE — 84439 ASSAY OF FREE THYROXINE: CPT

## 2024-05-22 PROCEDURE — 84403 ASSAY OF TOTAL TESTOSTERONE: CPT

## 2024-05-22 PROCEDURE — 85652 RBC SED RATE AUTOMATED: CPT

## 2024-05-22 PROCEDURE — 82627 DEHYDROEPIANDROSTERONE: CPT

## 2024-05-22 PROCEDURE — 83090 ASSAY OF HOMOCYSTEINE: CPT

## 2024-05-22 PROCEDURE — 86376 MICROSOMAL ANTIBODY EACH: CPT

## 2024-05-22 PROCEDURE — 99000 SPECIMEN HANDLING OFFICE-LAB: CPT

## 2024-05-22 PROCEDURE — 76536 US EXAM OF HEAD AND NECK: CPT

## 2024-05-22 PROCEDURE — 86800 THYROGLOBULIN ANTIBODY: CPT

## 2024-05-22 PROCEDURE — 84270 ASSAY OF SEX HORMONE GLOBUL: CPT

## 2024-05-22 PROCEDURE — 83690 ASSAY OF LIPASE: CPT

## 2024-05-22 PROCEDURE — 36415 COLL VENOUS BLD VENIPUNCTURE: CPT

## 2024-05-22 PROCEDURE — 84481 FREE ASSAY (FT-3): CPT

## 2024-05-22 PROCEDURE — 82977 ASSAY OF GGT: CPT

## 2024-05-22 PROCEDURE — 86141 C-REACTIVE PROTEIN HS: CPT

## 2024-05-22 PROCEDURE — 83615 LACTATE (LD) (LDH) ENZYME: CPT

## 2024-05-22 PROCEDURE — 82150 ASSAY OF AMYLASE: CPT

## 2024-05-23 LAB — IGE SERPL-ACNC: 107 KU/L (ref 0–114)

## 2024-05-24 ENCOUNTER — TELEPHONE (OUTPATIENT)
Dept: FAMILY MEDICINE | Facility: CLINIC | Age: 79
End: 2024-05-24
Payer: COMMERCIAL

## 2024-05-24 DIAGNOSIS — E04.1 THYROID NODULE: Primary | ICD-10-CM

## 2024-05-24 LAB
TESTOST FREE SERPL-MCNC: 0.08 NG/DL
TESTOST SERPL-MCNC: 8 NG/DL (ref 8–60)

## 2024-05-24 NOTE — TELEPHONE ENCOUNTER
Pt calling. States she saw the US result for he thyroid and would like PCP to comment on it as well as the lab results.   Pt is concerned about what she read and would appreciate a quick response.       Routing to PCP to review and advise.   Nayeli Chris RN

## 2024-05-25 LAB — T3REVERSE SERPL-MCNC: 13.5 NG/DL

## 2024-05-28 ENCOUNTER — THERAPY VISIT (OUTPATIENT)
Dept: PHYSICAL THERAPY | Facility: CLINIC | Age: 79
End: 2024-05-28
Attending: NURSE PRACTITIONER
Payer: COMMERCIAL

## 2024-05-28 DIAGNOSIS — M62.89 PELVIC FLOOR DYSFUNCTION IN FEMALE: ICD-10-CM

## 2024-05-28 PROCEDURE — 97110 THERAPEUTIC EXERCISES: CPT | Mod: GP | Performed by: PHYSICAL THERAPIST

## 2024-05-28 PROCEDURE — 97162 PT EVAL MOD COMPLEX 30 MIN: CPT | Mod: GP | Performed by: PHYSICAL THERAPIST

## 2024-05-28 PROCEDURE — 97530 THERAPEUTIC ACTIVITIES: CPT | Mod: GP | Performed by: PHYSICAL THERAPIST

## 2024-05-28 NOTE — PROGRESS NOTES
PHYSICAL THERAPY EVALUATION  Type of Visit: Evaluation    See electronic medical record for Abuse and Falls Screening details.    Subjective       Presenting condition or subjective complaint: pelvic floor evaluationLast year has been dealing with a lot of mouth/tooth issues so has had to focus on this vs pelvic floor/bowel concerns. Has had 3 abscesses due to seton placement, 2 inpatient/1 outpatient surgeries to clear seton (most recent procedure in May 2024). Evaluation at pelvic floor center next week June 4th, Dr. Jolly Teague at colorectal [facility]. Then will have [facility] gastroenterologist Melina Saxena on June 11th. Has had to cancel 2 international trips due to her pelvic floor/bowel issues. Working with  1x week, walking regularly.   Date of onset: 04/09/24 (order date)    Relevant medical history: Anemia; Bladder or bowel problems; Concussions; Foot drop; Hearing problems; High blood pressure; Osteoporosis; Sleep disorder like apnea; Vision problems   Dates & types of surgery: Seton    Prior diagnostic imaging/testing results:   ultrasounds   Prior therapy history for the same diagnosis, illness or injury: Yes PT-Coral Bowman    Living Environment  Social support: Alone   Type of home: House   Stairs to enter the home: Yes 2     Ramp:     Stairs inside the home:         Help at home:    Equipment owned:       Employment: No    Hobbies/Interests: travel    Patient goals for therapy: travel, camp (right now bathroom runs her life)be able to travel/camp. Get digestive taken of and get rid of seton (3+ years now) and have fistula actually repaired.      Objective      PELVIC EVALUATION  ADDITIONAL HISTORY:  Sex assigned at birth: Female  Gender identity:      Pronouns:        Bladder History:  Feels bladder filling:    Triggers for feeling of inability to wait to go to the bathroom:      How long can you wait to urinate: frequency/urgency worse at home(~ 2 hours), better when out and about  (4-5 hours)  Gets up at night to urinate:   1-2 currently  Can stop the flow of urine when urinating:    Volume of urine usually released:     Other issues:    Number of bladder infections in last 12 months:    Fluid intake per day:        Medications taken for bladder:       Activities causing urine leak:      Amount of urine typically leaked:    Pads used to help with leaking:          Bowel History:  Frequency of bowel movement: ~5-8/day but [place] from 1-7, 3 within 2-3 hours.  Consistency of stool: -- (completely variable)    Ignores the urge to defecate: No  Other bowel issues:    Length of time spent trying to have a bowel movement:      Sexual Function History:  Sexual orientation:      Sexually active:    Lubrication used:      Pelvic pain:      Pain or difficulty with orgasms/erection/ejaculation:      State of menopause:    Hormone medications:             Discussed reason for referral regarding pelvic health needs and external/internal pelvic floor muscle examination with patient/guardian.  Opportunity provided to ask questions and verbal consent for assessment and intervention was given.    BREATHING SYMMETRY: WNL    PELVIC EXAM  External Visual Inspection:  At rest: Normal  With voluntary pelvic floor contraction: Perineal elevation    Integumentary:   Hemorrhoids noticed, seton placed    External Digital Palpation per Perineum:   Ischiocavernosis: Unremarkable  Bulbo cavernosis: Unremarkable  Transverse perineal: Unremarkable  Levator ani: Tightness, Tenderness  Perineal body: Unremarkable    Internal Digital Palpation:  Per Vagina:  Tenderness  Myofascial Resistance to Palpation: Taut  Digital Muscle Performance: P (Power): Kegel strength 4/5, good relaxation  E (Endurance): 5-10 seconds    Per Rectum:  Deferred due to seton placement        ABDOMINAL ASSESSMENT  Abdominal Activation/Strength:  good TA set with exhale      Assessment & Plan   CLINICAL IMPRESSIONS  Medical Diagnosis: Pelvic floor  dysfunction in female (M62.89)    Treatment Diagnosis: incomplete emptying bowels, frequency   Impression/Assessment: Patient is a 78 year old female with pelvic floor/bowel emptying complaints.  The following significant findings have been identified: Decreased ROM/flexibility, Decreased proprioception, Impaired muscle performance, and Decreased activity tolerance. These impairments interfere with their ability to perform self care tasks, recreational activities, household chores, driving , household mobility, and community mobility as compared to previous level of function.     Clinical Decision Making (Complexity):  Clinical Presentation: Evolving/Changing  Clinical Presentation Rationale: based on medical and personal factors listed in PT evaluation  Clinical Decision Making (Complexity): Moderate complexity    PLAN OF CARE  Treatment Interventions:  Interventions: Manual Therapy, Neuromuscular Re-education, Therapeutic Activity, Therapeutic Exercise, Self-Care/Home Management    Long Term Goals     PT Goal 1  Goal Identifier: Report  complete bowel evacuation 1-2 xday  Rationale: to maximize safety and independence with performance of ADLs and functional tasks;to maximize safety and independence within the home;to maximize safety and independence within the community;to maximize safety and independence with transportation;to maximize safety and independence with self cares  Target Date: 08/22/24      Frequency of Treatment: 2x month  Duration of Treatment: 3 months    Recommended Referrals to Other Professionals: Physical Therapy  Education Assessment:   Learner/Method: Patient;No Barriers to Learning    Risks and benefits of evaluation/treatment have been explained.   Patient/Family/caregiver agrees with Plan of Care.     Evaluation Time:     PT Eval, Moderate Complexity Minutes (05853): 15       Signing Clinician: Larisa Dumont, PT,[facility]                                                                                    OUTPATIENT PHYSICAL THERAPY      PLAN OF TREATMENT FOR OUTPATIENT REHABILITATION   Patient's Last Name, First Name, Shaina Talamantes YOB: 1945   Provider's Name   [facility]   Medical Record No.  [id number]     Onset Date: 04/09/24 (order date)  Start of Care Date: 05/28/24     Medical Diagnosis:  Pelvic floor dysfunction in female (M62.89)      PT Treatment Diagnosis:  incomplete emptying bowels, frequency Plan of Treatment  Frequency/Duration: 2x month/ 3 months    Certification date from 05/28/24 to 08/25/24         See note for plan of treatment details and functional goals     Larisa Dumont, PT,OCS                         I CERTIFY THE NEED FOR THESE SERVICES FURNISHED UNDER        THIS PLAN OF TREATMENT AND WHILE UNDER MY CARE     (Physician attestation of this document indicates review and certification of the therapy plan).              Referring Provider:  Ami Aguilar    Initial Assessment  See Epic Evaluation- Start of Care Date: 05/28/24

## 2024-05-30 PROBLEM — M62.89 PELVIC FLOOR DYSFUNCTION IN FEMALE: Status: ACTIVE | Noted: 2022-08-15

## 2024-05-31 ENCOUNTER — ALLIED HEALTH/NURSE VISIT (OUTPATIENT)
Dept: FAMILY MEDICINE | Facility: CLINIC | Age: 79
End: 2024-05-31
Payer: COMMERCIAL

## 2024-05-31 VITALS — SYSTOLIC BLOOD PRESSURE: 128 MMHG | DIASTOLIC BLOOD PRESSURE: 74 MMHG

## 2024-05-31 DIAGNOSIS — Z01.30 BP CHECK: Primary | ICD-10-CM

## 2024-05-31 PROCEDURE — 99207 PR NO CHARGE NURSE ONLY: CPT | Performed by: NURSE PRACTITIONER

## 2024-05-31 NOTE — PROGRESS NOTES
Shaina Calixto was evaluated at [facility] on May 31, 2024 at which time her blood pressure was:    BP Readings from Last 1 Encounters:   05/31/24 128/74     No data recorded      Reviewed lifestyle modifications for blood pressure control and reduction: including making healthy food choices, managing weight, getting regular exercise, smoking cessation, reducing alcohol consumption, monitoring blood pressure regularly.     Symptoms: None    BP Goal:< 140/90 mmHg    BP Assessment:  BP at goal    Potential Reasons for BP too high: NA - Not applicable    BP Follow-Up Plan: Recheck BP in 6 months at pharmacy    Recommendation to Provider: none    Note completed by: Gio Winters RPH     Thank You   Kayla Jackson  [facility]

## 2024-06-05 ENCOUNTER — THERAPY VISIT (OUTPATIENT)
Dept: PHYSICAL THERAPY | Facility: CLINIC | Age: 79
End: 2024-06-05
Attending: NURSE PRACTITIONER
Payer: COMMERCIAL

## 2024-06-05 DIAGNOSIS — M62.89 PELVIC FLOOR DYSFUNCTION IN FEMALE: Primary | ICD-10-CM

## 2024-06-05 PROCEDURE — 97140 MANUAL THERAPY 1/> REGIONS: CPT | Mod: GP | Performed by: PHYSICAL THERAPIST

## 2024-06-05 PROCEDURE — 97530 THERAPEUTIC ACTIVITIES: CPT | Mod: GP | Performed by: PHYSICAL THERAPIST

## 2024-06-10 ENCOUNTER — HOSPITAL ENCOUNTER (OUTPATIENT)
Dept: ULTRASOUND IMAGING | Facility: CLINIC | Age: 79
Discharge: HOME | End: 2024-06-10
Attending: NURSE PRACTITIONER | Admitting: NURSE PRACTITIONER
Payer: COMMERCIAL

## 2024-06-10 DIAGNOSIS — E04.1 THYROID NODULE: ICD-10-CM

## 2024-06-10 PROCEDURE — 88173 CYTOPATH EVAL FNA REPORT: CPT | Mod: TC | Performed by: NURSE PRACTITIONER

## 2024-06-10 PROCEDURE — 250N000009 HC RX 250: Performed by: RADIOLOGY

## 2024-06-10 PROCEDURE — 272N000431 US BIOPSY THYROID FINE NEEDLE ASPIRATION

## 2024-06-10 RX ADMIN — LIDOCAINE HYDROCHLORIDE 10 ML: 10 INJECTION, SOLUTION EPIDURAL; INFILTRATION; INTRACAUDAL; PERINEURAL at 13:07

## 2024-06-10 NOTE — PROGRESS NOTES
Patient tolerated bilateral thyroid nodule biopsy well by Dr. Potts.  Bandage clean, dry and intact at time of discharge.  Patient states understanding of discharge instructions and left department in satisfactory condition.

## 2024-06-11 ENCOUNTER — TRANSFERRED RECORDS (OUTPATIENT)
Dept: HEALTH INFORMATION MANAGEMENT | Facility: CLINIC | Age: 79
End: 2024-06-11
Payer: COMMERCIAL

## 2024-06-12 ENCOUNTER — THERAPY VISIT (OUTPATIENT)
Dept: PHYSICAL THERAPY | Facility: CLINIC | Age: 79
End: 2024-06-12
Attending: NURSE PRACTITIONER
Payer: COMMERCIAL

## 2024-06-12 DIAGNOSIS — M62.89 PELVIC FLOOR DYSFUNCTION IN FEMALE: Primary | ICD-10-CM

## 2024-06-12 PROCEDURE — 97140 MANUAL THERAPY 1/> REGIONS: CPT | Mod: GP | Performed by: PHYSICAL THERAPIST

## 2024-06-13 LAB
PATH REPORT.COMMENTS IMP SPEC: NORMAL
PATH REPORT.FINAL DX SPEC: NORMAL
PATH REPORT.FINAL DX SPEC: NORMAL
PATH REPORT.GROSS SPEC: NORMAL
PATH REPORT.GROSS SPEC: NORMAL
PATH REPORT.MICROSCOPIC SPEC OTHER STN: NORMAL
PATH REPORT.MICROSCOPIC SPEC OTHER STN: NORMAL
PATH REPORT.RELEVANT HX SPEC: NORMAL
PATH REPORT.RELEVANT HX SPEC: NORMAL

## 2024-06-13 PROCEDURE — 88173 CYTOPATH EVAL FNA REPORT: CPT | Mod: 26

## 2024-06-20 ENCOUNTER — NURSE TRIAGE (OUTPATIENT)
Dept: FAMILY MEDICINE | Facility: CLINIC | Age: 79
End: 2024-06-20
Payer: COMMERCIAL

## 2024-06-20 NOTE — TELEPHONE ENCOUNTER
[facility]    Nurse Triage SBAR    Is this a 2nd Level Triage? NO    Situation:   Patient calling with symptom(s) of possible DVT.  No available appointments in clinic today.    Background:   Acupuncture provider is recommending patient be seen for r/o DVT  History of A-fib: apixaban ANTICOAGULANT (ELIQUIS) 5 MG tablet  No injury    Assessment:   Pain for 3 days  Right knee to mid-calf  Normal color      Protocol Recommended Disposition:   Call ADS/Go to ED/UCC Now (Or To Office with PCP Approval)    Recommendation: patient advised to go to Urgent Care. Patient agrees with plan.    Routed to provider to update with patient status.      Encourage to return call Clinic Triage nurse if further questions/concerns that may come up or if symptoms do not improve, worsen, or new symptoms develop.  Patient agrees with plan    Does the patient meet one of the following criteria for ADS visit consideration?   [place], [place]    NOTES: Disposition was determined by the first positive assessment question, therefore all previous assessment questions were negative.     Reason for Disposition   Thigh or calf pain in only one leg and present > 1 hour    Additional Information   Negative: Looks like a broken bone or dislocated joint (e.g., crooked or deformed)   Negative: Sounds like a life-threatening emergency to the triager   Negative: Followed a hip injury   Negative: Followed a knee injury   Negative: Followed an ankle or foot injury   Negative: Back pain radiating (shooting) into leg(s)   Negative: Foot pain is main symptom   Negative: Ankle pain is main symptom   Negative: Knee pain is main symptom   Negative: Leg swelling is main symptom   Negative: Chest pain   Negative: Difficulty breathing   Negative: Entire foot is cool or blue in comparison to other side   Negative: Unable to walk   Negative: Fever and red area (or area very tender to touch)   Negative: Swollen joint and  fever    Protocols used: Leg Pain-A-[place]

## 2024-06-21 NOTE — TELEPHONE ENCOUNTER
Called patient to follow up. She was seen at [facility] last night. She does not have a blood clot but does have a Baker's cyst. No further questions from patient at this time.    Brittani Hermosillo RN on 6/21/2024 at 9:07 AM

## 2024-06-26 ENCOUNTER — TRANSFERRED RECORDS (OUTPATIENT)
Dept: HEALTH INFORMATION MANAGEMENT | Facility: CLINIC | Age: 79
End: 2024-06-26
Payer: COMMERCIAL

## 2024-06-26 ENCOUNTER — HOSPITAL ENCOUNTER (OUTPATIENT)
Dept: NUCLEAR MEDICINE | Facility: CLINIC | Age: 79
Setting detail: NUCLEAR MEDICINE
Discharge: HOME | End: 2024-06-26
Attending: INTERNAL MEDICINE | Admitting: INTERNAL MEDICINE
Payer: COMMERCIAL

## 2024-06-26 DIAGNOSIS — R63.4 WEIGHT LOSS: ICD-10-CM

## 2024-06-26 DIAGNOSIS — R19.4 ALTERED BOWEL HABITS: ICD-10-CM

## 2024-06-26 PROCEDURE — A9541 TC99M SULFUR COLLOID: HCPCS | Performed by: RADIOLOGY

## 2024-06-26 PROCEDURE — 78264 GASTRIC EMPTYING IMG STUDY: CPT

## 2024-06-26 PROCEDURE — 343N000001 HC RX 343: Performed by: RADIOLOGY

## 2024-06-26 RX ADMIN — Medication 1.1 MILLICURIE: at 08:12

## 2024-07-02 ENCOUNTER — OFFICE VISIT (OUTPATIENT)
Dept: DERMATOLOGY | Facility: CLINIC | Age: 79
End: 2024-07-02
Payer: COMMERCIAL

## 2024-07-02 DIAGNOSIS — L72.0 MILIA: ICD-10-CM

## 2024-07-02 DIAGNOSIS — D18.01 CHERRY ANGIOMA: Primary | ICD-10-CM

## 2024-07-02 DIAGNOSIS — L81.4 SOLAR LENTIGO: ICD-10-CM

## 2024-07-02 DIAGNOSIS — Z12.83 SKIN CANCER SCREENING: ICD-10-CM

## 2024-07-02 DIAGNOSIS — L82.1 SEBORRHEIC KERATOSES: ICD-10-CM

## 2024-07-02 DIAGNOSIS — I78.1 TELANGIECTASIA: ICD-10-CM

## 2024-07-02 DIAGNOSIS — D22.9 MULTIPLE BENIGN NEVI: ICD-10-CM

## 2024-07-02 PROCEDURE — 99213 OFFICE O/P EST LOW 20 MIN: CPT | Performed by: DERMATOLOGY

## 2024-07-02 ASSESSMENT — PAIN SCALES - GENERAL: PAINLEVEL: NO PAIN (0)

## 2024-07-02 NOTE — PROGRESS NOTES
[facility] Dermatology Clinic Note  Encounter Date: Jul 2, 2024    Dermatology Problem List:  1. History of CNH  2. History of blue nevus r arm biopsy in 3/12  3. AK  4. Suspected contact dermatitis. Biopsy of L lower abdomen in 12/16 by [facility]. Increased eos in epidermis. No patch testing performed. Pt given prescription for triamcinolone ointment which she did not use due to concern for allergy.   5. History of Well's diagnosed in [facility] [place] in 1996 and 1999  6. Chondrodermatitis nodularis chronica helicis   7. Milia   - resolved. Retinoid if they return.     Last FBSE: 07/02/2024  ____________________________________________    Assessment & Plan:     # Lesion to monitor, L clavicle, ddx resolving bruise with underlying lentigo.   - resolved today  Discussed the natural history and benign nature of this lesion. Reassurance provided that no additional treatment is necessary.      # History of CNH. Present on today's exam.  - patient notes it does not bother her at this time.  Discussed natural etiology and preventative measures. Recommended conservative measures like sleeping on her other side. Discussed option to complete a shave removal of the lesion should it become irritated.      # Benign lesions: Multiple benign nevi, solar lentigos, seborrheic keratoses, cherry angiomas. Explained to patient benign nature of lesion. No treatment is necessary at this time unless the lesion changes or becomes symptomatic.   - ABCDs of melanoma were discussed and self skin checks were advised.  - Sun precaution was advised including the use of sun screens of SPF 30 or higher, sun protective clothing, and avoidance of tanning beds.    Procedures Performed:   None    Follow-up: 1 year in person, or sooner for new or changing lesions    Staff and Scribe:  Scribe Disclosure:   By signing my name below, I, Mila Lambert, attest that this documentation has been prepared under the direction and in the presence of Bronson Singh,  MD.  - Electronically Signed: Mila Lambert 07/02/24     I, Farhana Koroma, MS4, saw and discussed the patient with the attending physician Dr. Singh.     Provider Disclosure:  I agree with above History, Review of Systems, Physical exam and Plan.  I have reviewed the content of the documentation and have edited it as needed. I have personally performed the services documented here and the documentation accurately represents those services and the decisions I have made.      Electronically signed by:  Bronson Singh MD  Dermatology/Dermatopathology Staff Physician  , Department of Dermatology       ____________________________________________    CC: Skin Check (Shaina is here today for a skin check and does not have any areas of concern.)      HPI:  Ms. Shaina Calixto is a(n) extremely pleasant 78 year old female who presents today as a return patient for a FBSE.     The patient denies any painful, bleeding, or nonhealing lesions, or any new or changing moles. She notes she previously had two moles that had started and would itch and cause discomfort, but they eventually resolved. No personal or family history of skin cancer. Has had 2 previous biopsies that were benign in nature. Reports history of blistering sunburns. She reports she now avoids excessive sun exposure. 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    Patient is otherwise feeling well, without additional skin concerns.     Labs Reviewed:  N/A    Physical exam:  Vitals: LMP  (LMP Unknown)   GEN: This is a well developed, well-nourished female in no acute distress, in a pleasant mood.    SKIN: Full skin, which includes the head/face, both arms, chest, back, abdomen,both legs, genitalia and/or groin buttocks, digits and/or nails, was examined.  - interval resolution of bruising on L clavicle revealing a brown macule  - sun exposure related telangiectasias on L clavicle  - flesh colored papule  on the left antihelix.  - There are dome shaped bright red papules on the head/neck, trunk, extremities.   - Multiple regular brown pigmented macules and papules are identified on the head/neck, trunk, extremities.   - Scattered brown macules on sun exposed areas.  - There are waxy stuck on tan to brown papules on the head/neck, trunk, extremities.  - No other lesions of concern on areas examined.       Medications:  Current Outpatient Medications   Medication Sig Dispense Refill    apixaban ANTICOAGULANT (ELIQUIS) 5 MG tablet Take 5 mg by mouth 2 times daily       CARTIA  MG 24 hr capsule Take 120 mg by mouth daily      cholecalciferol (VITAMIN D3) 125 MCG (5000 UT) TABS tablet Take 1 tablet by mouth daily      metroNIDAZOLE (FLAGYL) 500 MG tablet Take 500 mg by mouth 3 times daily      oxyCODONE (ROXICODONE) 5 MG tablet Take 1 tablet (5 mg) by mouth every 4 hours as needed for moderate to severe pain 6 tablet 0    UNABLE TO FIND daily MEDICATION NAME: Butyrate 300 mg soft gel       No current facility-administered medications for this visit.      Past Medical History:   Patient Active Problem List   Diagnosis    Breast cancer screening    Mild major depression (H)    Yeast dermatitis    Stress    Urticaria    CARDIOVASCULAR SCREENING; LDL GOAL LESS THAN 160    High risk HPV infection    Multiple nevi    Cherry angioma    Screening for skin cancer    Restless legs syndrome (RLS)    Plantar fasciitis    HPV (human papilloma virus) infection    Raynaud's disease without gangrene    SABRA (obstructive sleep apnea)    Age-related osteoporosis without current pathological fracture    Eczema, unspecified type    Dyspepsia    Near syncope    Atrial fibrillation, persistent (H)    Major depressive disorder, recurrent episode, in full remission (H24)    CKD (chronic kidney disease), stage II    Pelvic floor dysfunction in female    Preop general physical exam     Past Medical History:   Diagnosis Date    Ankylosing  spondylitis (H)      HLAB19 +    Atrial fibrillation, persistent (H) 10/15/2021    CKD (chronic kidney disease), stage II 01/07/2022    Complication of anesthesia     Depressive disorder college, young adult    resolved for many years    Eosinophilic cellulitis     Mild major depression (H24)     Osteopenia     -2.1; Lumbar -2.5; Fem Neck     Sleep apnea     Has a Mouth appliance    Small intestinal bacterial overgrowth 05/2017    has had for years but finally diagnosed correctly May 2017    Vitamin deficiency        CC No referring provider defined for this encounter. on close of this encounter.    This note has been created using voice recognition software; while it has been reviewed, some errors may persist.

## 2024-07-02 NOTE — PATIENT INSTRUCTIONS

## 2024-07-02 NOTE — PROGRESS NOTES
[facility] Dermatology Note  Encounter Date: Jul 2, 2024  Office Visit     Dermatology Problem List:  1. Benign nevi  3. History of blue nevus r arm biopsy in 3/12  4. AK  5. Suspected contact dermatitis. Biopsy of L lower abdomen in 12/16 by [facility]. Increased eos in epidermis. No patch testing performed. Pt given prescription for triamcinolone ointment which she did not use due to concern for allergy.   6. History of Well's diagnosed in [facility] in 1996 and 1999  7. Chondrodermatitis nodularis chronica helicis   8. Milia   -resolved. Retinoid if they return.     ____________________________________________    Assessment & Plan:     # ***.  {kkplans:289010}   - ***     # ***.   {kkplans:782530}   - ***     # Chondrodermatitis nodularis chronica helicis   Discussed natural etiology and preventative measures. Recommended conservative measures like sleeping on her other side.     Procedures Performed:   None    Follow-up: annual skin check    Staff and Medical Student:     Farhana KENDALL, MS4, saw and discussed the patient with the attending physician Dr. Singh.    ____________________________________________    CC: Skin Check (Shaina is here today for a skin check and does not have any areas of concern.)    HPI:  Ms. Shaina Calixto is a(n) 78 year old female who presents today as a return patient for skin exam. She has no lesion of concerns today.     Patient is otherwise feeling well, without additional skin concerns.    Labs Reviewed:  ***N/A    Physical Exam:  Vitals: LMP  (LMP Unknown)   SKIN: Total skin excluding the undergarment areas was performed. The exam included the head/face, neck, both arms, chest, back, abdomen, both legs, digits and/or nails.   - There are dome shaped bright red papules on the trunk.   - Multiple regular brown pigmented macules and papules..   -There are fine lines and dyspigmentation on sun exposed areas of the face and chest.  -Scattered brown macules on  sun exposed areas..   - Flesh colored paupul on the right    - {Skin Exam Derm:710715}.   - No other lesions of concern on areas examined.     Medications:  Current Outpatient Medications   Medication Sig Dispense Refill    apixaban ANTICOAGULANT (ELIQUIS) 5 MG tablet Take 5 mg by mouth 2 times daily       CARTIA  MG 24 hr capsule Take 120 mg by mouth daily      cholecalciferol (VITAMIN D3) 125 MCG (5000 UT) TABS tablet Take 1 tablet by mouth daily      metroNIDAZOLE (FLAGYL) 500 MG tablet Take 500 mg by mouth 3 times daily      oxyCODONE (ROXICODONE) 5 MG tablet Take 1 tablet (5 mg) by mouth every 4 hours as needed for moderate to severe pain 6 tablet 0    UNABLE TO FIND daily MEDICATION NAME: Butyrate 300 mg soft gel       No current facility-administered medications for this visit.      Past Medical History:   Patient Active Problem List   Diagnosis    Breast cancer screening    Mild major depression (H)    Yeast dermatitis    Stress    Urticaria    CARDIOVASCULAR SCREENING; LDL GOAL LESS THAN 160    High risk HPV infection    Multiple nevi    Cherry angioma    Screening for skin cancer    Restless legs syndrome (RLS)    Plantar fasciitis    HPV (human papilloma virus) infection    Raynaud's disease without gangrene    SABRA (obstructive sleep apnea)    Age-related osteoporosis without current pathological fracture    Eczema, unspecified type    Dyspepsia    Near syncope    Atrial fibrillation, persistent (H)    Major depressive disorder, recurrent episode, in full remission (H24)    CKD (chronic kidney disease), stage II    Pelvic floor dysfunction in female    Preop general physical exam     Past Medical History:   Diagnosis Date    Ankylosing spondylitis (H)      HLAB19 +    Atrial fibrillation, persistent (H) 10/15/2021    CKD (chronic kidney disease), stage II 01/07/2022    Complication of anesthesia     Depressive disorder college, young adult    resolved for many years    Eosinophilic cellulitis      Mild major depression (H24)     Osteopenia     -2.1; Lumbar -2.5; Fem Neck     Sleep apnea     Has a Mouth appliance    Small intestinal bacterial overgrowth 05/2017    has had for years but finally diagnosed correctly May 2017    Vitamin deficiency        CC No referring provider defined for this encounter. on close of this encounter.

## 2024-07-02 NOTE — LETTER
7/2/2024       RE: Shaina Calixto  [address] [facility] [address]     Dear Colleague,    Thank you for referring your patient, Shaina Calixto, to the [facility]ERMATOLOG[facility] [place] at [facility]. Please see a copy of my visit note below.    [facility] Dermatology Clinic Note  Encounter Date: Jul 2, 2024    Dermatology Problem List:  1. History of CNH  2. History of blue nevus r arm biopsy in 3/12  3. AK  4. Suspected contact dermatitis. Biopsy of L lower abdomen in 12/16 by [facility]. Increased eos in epidermis. No patch testing performed. Pt given prescription for triamcinolone ointment which she did not use due to concern for allergy.   5. History of Well's diagnosed in [facility] [place] in 1996 and 1999  6. Chondrodermatitis nodularis chronica helicis   7. Milia   - resolved. Retinoid if they return.     Last FBSE: 07/02/2024  ____________________________________________    Assessment & Plan:     # Lesion to monitor, L clavicle, ddx resolving bruise with underlying lentigo.   - resolved today  Discussed the natural history and benign nature of this lesion. Reassurance provided that no additional treatment is necessary.      # History of CNH. Present on today's exam.  - patient notes it does not bother her at this time.  Discussed natural etiology and preventative measures. Recommended conservative measures like sleeping on her other side. Discussed option to complete a shave removal of the lesion should it become irritated.      # Benign lesions: Multiple benign nevi, solar lentigos, seborrheic keratoses, cherry angiomas. Explained to patient benign nature of lesion. No treatment is necessary at this time unless the lesion changes or becomes symptomatic.   - ABCDs of melanoma were discussed and self skin checks were advised.  - Sun precaution was advised including the use of sun screens of SPF 30 or higher, sun protective clothing, and avoidance of  tanning beds.    Procedures Performed:   None    Follow-up: 1 year in person, or sooner for new or changing lesions    Staff and Scribe:  Scribe Disclosure:   By signing my name below, I, Mila Fausto, attest that this documentation has been prepared under the direction and in the presence of Bronson Singh MD.  - Electronically Signed: Mila Lambert 07/02/24     I, Farhana Koroma, MS4, saw and discussed the patient with the attending physician Dr. Singh.     Provider Disclosure:  I agree with above History, Review of Systems, Physical exam and Plan.  I have reviewed the content of the documentation and have edited it as needed. I have personally performed the services documented here and the documentation accurately represents those services and the decisions I have made.      Electronically signed by:  Bronson Singh MD  Dermatology/Dermatopathology Staff Physician  , Department of Dermatology       ____________________________________________    CC: Skin Check (Shaina is here today for a skin check and does not have any areas of concern.)      HPI:  Ms. Shaina Calixto is a(n) extremely pleasant 78 year old female who presents today as a return patient for a FBSE.     The patient denies any painful, bleeding, or nonhealing lesions, or any new or changing moles. She notes she previously had two moles that had started and would itch and cause discomfort, but they eventually resolved. No personal or family history of skin cancer. Has had 2 previous biopsies that were benign in nature. Reports history of blistering sunburns. She reports she now avoids excessive sun exposure. 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    Patient is otherwise feeling well, without additional skin concerns.     Labs Reviewed:  N/A    Physical exam:  Vitals: LMP  (LMP Unknown)   GEN: This is a well developed, well-nourished female in no acute distress, in a pleasant mood.     SKIN: Full skin, which includes the head/face, both arms, chest, back, abdomen,both legs, genitalia and/or groin buttocks, digits and/or nails, was examined.  - interval resolution of bruising on L clavicle revealing a brown macule  - sun exposure related telangiectasias on L clavicle  - flesh colored papule on the left antihelix.  - There are dome shaped bright red papules on the head/neck, trunk, extremities.   - Multiple regular brown pigmented macules and papules are identified on the head/neck, trunk, extremities.   - Scattered brown macules on sun exposed areas.  - There are waxy stuck on tan to brown papules on the head/neck, trunk, extremities.  - No other lesions of concern on areas examined.       Medications:  Current Outpatient Medications   Medication Sig Dispense Refill     apixaban ANTICOAGULANT (ELIQUIS) 5 MG tablet Take 5 mg by mouth 2 times daily        CARTIA  MG 24 hr capsule Take 120 mg by mouth daily       cholecalciferol (VITAMIN D3) 125 MCG (5000 UT) TABS tablet Take 1 tablet by mouth daily       metroNIDAZOLE (FLAGYL) 500 MG tablet Take 500 mg by mouth 3 times daily       oxyCODONE (ROXICODONE) 5 MG tablet Take 1 tablet (5 mg) by mouth every 4 hours as needed for moderate to severe pain 6 tablet 0     UNABLE TO FIND daily MEDICATION NAME: Butyrate 300 mg soft gel       No current facility-administered medications for this visit.      Past Medical History:   Patient Active Problem List   Diagnosis     Breast cancer screening     Mild major depression (H)     Yeast dermatitis     Stress     Urticaria     CARDIOVASCULAR SCREENING; LDL GOAL LESS THAN 160     High risk HPV infection     Multiple nevi     Cherry angioma     Screening for skin cancer     Restless legs syndrome (RLS)     Plantar fasciitis     HPV (human papilloma virus) infection     Raynaud's disease without gangrene     SABRA (obstructive sleep apnea)     Age-related osteoporosis without current pathological fracture      Eczema, unspecified type     Dyspepsia     Near syncope     Atrial fibrillation, persistent (H)     Major depressive disorder, recurrent episode, in full remission (H24)     CKD (chronic kidney disease), stage II     Pelvic floor dysfunction in female     Preop general physical exam     Past Medical History:   Diagnosis Date     Ankylosing spondylitis (H)      HLAB19 +     Atrial fibrillation, persistent (H) 10/15/2021     CKD (chronic kidney disease), stage II 01/07/2022     Complication of anesthesia      Depressive disorder college, young adult    resolved for many years     Eosinophilic cellulitis      Mild major depression (H24)      Osteopenia     -2.1; Lumbar -2.5; Fem Neck      Sleep apnea     Has a Mouth appliance     Small intestinal bacterial overgrowth 05/2017    has had for years but finally diagnosed correctly May 2017     Vitamin deficiency        CC No referring provider defined for this encounter. on close of this encounter.    This note has been created using voice recognition software; while it has been reviewed, some errors may persist.       Again, thank you for allowing me to participate in the care of your patient.      Sincerely,    Bronson Singh MD

## 2024-07-03 ENCOUNTER — THERAPY VISIT (OUTPATIENT)
Dept: PHYSICAL THERAPY | Facility: CLINIC | Age: 79
End: 2024-07-03
Payer: COMMERCIAL

## 2024-07-03 DIAGNOSIS — M62.89 PELVIC FLOOR DYSFUNCTION IN FEMALE: Primary | ICD-10-CM

## 2024-07-03 PROCEDURE — 97140 MANUAL THERAPY 1/> REGIONS: CPT | Mod: GP | Performed by: PHYSICAL THERAPIST

## 2024-07-08 ENCOUNTER — HOSPITAL ENCOUNTER (OUTPATIENT)
Dept: GENERAL RADIOLOGY | Facility: CLINIC | Age: 79
Discharge: HOME | End: 2024-07-08
Attending: INTERNAL MEDICINE | Admitting: INTERNAL MEDICINE
Payer: COMMERCIAL

## 2024-07-08 DIAGNOSIS — R19.4 ALTERED BOWEL HABITS: ICD-10-CM

## 2024-07-08 DIAGNOSIS — R63.4 WEIGHT LOSS: ICD-10-CM

## 2024-07-08 PROCEDURE — 74018 RADEX ABDOMEN 1 VIEW: CPT

## 2024-07-10 ENCOUNTER — HOSPITAL ENCOUNTER (OUTPATIENT)
Dept: GENERAL RADIOLOGY | Facility: CLINIC | Age: 79
Discharge: HOME | End: 2024-07-10
Attending: INTERNAL MEDICINE | Admitting: INTERNAL MEDICINE
Payer: COMMERCIAL

## 2024-07-10 DIAGNOSIS — R63.4 WEIGHT LOSS: ICD-10-CM

## 2024-07-10 DIAGNOSIS — R19.4 ALTERED BOWEL HABITS: ICD-10-CM

## 2024-07-10 PROCEDURE — 74018 RADEX ABDOMEN 1 VIEW: CPT

## 2024-07-12 ENCOUNTER — TRANSFERRED RECORDS (OUTPATIENT)
Dept: HEALTH INFORMATION MANAGEMENT | Facility: CLINIC | Age: 79
End: 2024-07-12
Payer: COMMERCIAL

## 2024-07-12 ENCOUNTER — HOSPITAL ENCOUNTER (OUTPATIENT)
Dept: GENERAL RADIOLOGY | Facility: CLINIC | Age: 79
Discharge: HOME | End: 2024-07-12
Attending: INTERNAL MEDICINE | Admitting: INTERNAL MEDICINE
Payer: COMMERCIAL

## 2024-07-12 DIAGNOSIS — R19.4 ALTERED BOWEL HABITS: ICD-10-CM

## 2024-07-12 DIAGNOSIS — R63.4 WEIGHT LOSS: ICD-10-CM

## 2024-07-12 PROCEDURE — 74018 RADEX ABDOMEN 1 VIEW: CPT

## 2024-07-17 ENCOUNTER — THERAPY VISIT (OUTPATIENT)
Dept: PHYSICAL THERAPY | Facility: CLINIC | Age: 79
End: 2024-07-17
Payer: COMMERCIAL

## 2024-07-17 DIAGNOSIS — M62.89 PELVIC FLOOR DYSFUNCTION IN FEMALE: Primary | ICD-10-CM

## 2024-07-17 PROCEDURE — 97140 MANUAL THERAPY 1/> REGIONS: CPT | Mod: GP | Performed by: PHYSICAL THERAPIST

## 2024-07-21 ENCOUNTER — PATIENT OUTREACH (OUTPATIENT)
Dept: CARE COORDINATION | Facility: CLINIC | Age: 79
End: 2024-07-21
Payer: COMMERCIAL

## 2024-07-23 ENCOUNTER — THERAPY VISIT (OUTPATIENT)
Dept: PHYSICAL THERAPY | Facility: CLINIC | Age: 79
End: 2024-07-23
Payer: COMMERCIAL

## 2024-07-23 ENCOUNTER — ALLIED HEALTH/NURSE VISIT (OUTPATIENT)
Dept: FAMILY MEDICINE | Facility: CLINIC | Age: 79
End: 2024-07-23

## 2024-07-23 VITALS — SYSTOLIC BLOOD PRESSURE: 112 MMHG | DIASTOLIC BLOOD PRESSURE: 68 MMHG

## 2024-07-23 DIAGNOSIS — M62.89 PELVIC FLOOR DYSFUNCTION IN FEMALE: Primary | ICD-10-CM

## 2024-07-23 DIAGNOSIS — Z01.30 BP CHECK: Primary | ICD-10-CM

## 2024-07-23 PROCEDURE — 99207 PR NO CHARGE NURSE ONLY: CPT | Performed by: NURSE PRACTITIONER

## 2024-07-23 PROCEDURE — 97110 THERAPEUTIC EXERCISES: CPT | Mod: GP | Performed by: PHYSICAL THERAPIST

## 2024-07-23 PROCEDURE — 97140 MANUAL THERAPY 1/> REGIONS: CPT | Mod: GP | Performed by: PHYSICAL THERAPIST

## 2024-07-23 NOTE — PROGRESS NOTES
Shaina Calixto was evaluated at [facility] on July 23, 2024 at which time her blood pressure was:    BP Readings from Last 1 Encounters:   07/23/24 112/68     No data recorded      Reviewed lifestyle modifications for blood pressure control and reduction: including making healthy food choices, managing weight, getting regular exercise, smoking cessation, reducing alcohol consumption, monitoring blood pressure regularly.     Symptoms: None    BP Goal:< 140/90 mmHg    BP Assessment:  BP at goal    Potential Reasons for BP too high: NA - Not applicable    BP Follow-Up Plan: Recheck BP in 6 months at pharmacy    Recommendation to Provider: none    Note completed by: Gio Winters RPH

## 2024-07-26 ENCOUNTER — TRANSFERRED RECORDS (OUTPATIENT)
Dept: HEALTH INFORMATION MANAGEMENT | Facility: CLINIC | Age: 79
End: 2024-07-26
Payer: COMMERCIAL

## 2024-07-30 ENCOUNTER — THERAPY VISIT (OUTPATIENT)
Dept: PHYSICAL THERAPY | Facility: CLINIC | Age: 79
End: 2024-07-30
Payer: COMMERCIAL

## 2024-07-30 DIAGNOSIS — M62.89 PELVIC FLOOR DYSFUNCTION IN FEMALE: Primary | ICD-10-CM

## 2024-07-30 PROCEDURE — 97140 MANUAL THERAPY 1/> REGIONS: CPT | Mod: GP | Performed by: PHYSICAL THERAPIST

## 2024-08-02 ENCOUNTER — TRANSFERRED RECORDS (OUTPATIENT)
Dept: HEALTH INFORMATION MANAGEMENT | Facility: CLINIC | Age: 79
End: 2024-08-02
Payer: COMMERCIAL

## 2024-08-05 ENCOUNTER — OFFICE VISIT (OUTPATIENT)
Dept: FAMILY MEDICINE | Facility: CLINIC | Age: 79
End: 2024-08-05
Payer: COMMERCIAL

## 2024-08-05 ENCOUNTER — THERAPY VISIT (OUTPATIENT)
Dept: PHYSICAL THERAPY | Facility: CLINIC | Age: 79
End: 2024-08-05
Payer: COMMERCIAL

## 2024-08-05 VITALS
SYSTOLIC BLOOD PRESSURE: 132 MMHG | WEIGHT: 134.2 LBS | BODY MASS INDEX: 22.36 KG/M2 | TEMPERATURE: 98.1 F | RESPIRATION RATE: 12 BRPM | DIASTOLIC BLOOD PRESSURE: 88 MMHG | OXYGEN SATURATION: 99 % | HEART RATE: 80 BPM | HEIGHT: 65 IN

## 2024-08-05 DIAGNOSIS — M62.89 PELVIC FLOOR DYSFUNCTION IN FEMALE: Primary | ICD-10-CM

## 2024-08-05 DIAGNOSIS — N18.30 STAGE 3 CHRONIC KIDNEY DISEASE, UNSPECIFIED WHETHER STAGE 3A OR 3B CKD (H): ICD-10-CM

## 2024-08-05 DIAGNOSIS — K60.30 ANAL FISTULA: ICD-10-CM

## 2024-08-05 DIAGNOSIS — R71.8 MICROCYTIC RED BLOOD CELLS: ICD-10-CM

## 2024-08-05 DIAGNOSIS — Z01.818 PREOP GENERAL PHYSICAL EXAM: Primary | ICD-10-CM

## 2024-08-05 DIAGNOSIS — Z79.01 ON ANTICOAGULANT THERAPY: ICD-10-CM

## 2024-08-05 DIAGNOSIS — I48.20 CHRONIC ATRIAL FIBRILLATION, UNSPECIFIED (H): ICD-10-CM

## 2024-08-05 PROBLEM — D22.9 MELANOCYTIC NEVUS OF SKIN: Status: ACTIVE | Noted: 2018-07-24

## 2024-08-05 PROBLEM — H01.003 BLEPHARITIS OF BOTH EYES: Status: ACTIVE | Noted: 2020-04-25

## 2024-08-05 PROBLEM — N94.9 DISORDER OF PELVIS: Status: ACTIVE | Noted: 2022-06-13

## 2024-08-05 PROBLEM — I73.00 RAYNAUD'S PHENOMENON: Status: ACTIVE | Noted: 2018-09-02

## 2024-08-05 PROBLEM — I48.11 LONGSTANDING PERSISTENT ATRIAL FIBRILLATION (H): Status: ACTIVE | Noted: 2021-10-15

## 2024-08-05 PROBLEM — I67.1 CEREBRAL ARTERIAL ANEURYSM: Status: ACTIVE | Noted: 2020-12-16

## 2024-08-05 PROBLEM — M81.0 OSTEOPOROSIS WITHOUT PATHOLOGICAL FRACTURE: Status: ACTIVE | Noted: 2018-09-02

## 2024-08-05 PROBLEM — N18.31 STAGE 3A CHRONIC KIDNEY DISEASE (H): Status: ACTIVE | Noted: 2022-01-07

## 2024-08-05 PROBLEM — D18.01 HEMANGIOMA OF SKIN: Status: ACTIVE | Noted: 2018-07-24

## 2024-08-05 PROBLEM — R19.00 ABDOMINAL MASS: Status: ACTIVE | Noted: 2022-06-13

## 2024-08-05 PROBLEM — G60.9 PERIPHERAL NEUROPATHY, IDIOPATHIC: Status: ACTIVE | Noted: 2020-12-24

## 2024-08-05 PROBLEM — H01.006 BLEPHARITIS OF BOTH EYES: Status: ACTIVE | Noted: 2020-04-25

## 2024-08-05 PROBLEM — R55 SYNCOPE AND COLLAPSE: Status: ACTIVE | Noted: 2018-09-02

## 2024-08-05 LAB
ANION GAP SERPL CALCULATED.3IONS-SCNC: 11 MMOL/L (ref 7–15)
BUN SERPL-MCNC: 21.1 MG/DL (ref 8–23)
CALCIUM SERPL-MCNC: 9.6 MG/DL (ref 8.8–10.4)
CHLORIDE SERPL-SCNC: 101 MMOL/L (ref 98–107)
CREAT SERPL-MCNC: 0.89 MG/DL (ref 0.51–0.95)
EGFRCR SERPLBLD CKD-EPI 2021: 66 ML/MIN/1.73M2
ERYTHROCYTE [DISTWIDTH] IN BLOOD BY AUTOMATED COUNT: 13.1 % (ref 10–15)
GLUCOSE SERPL-MCNC: 91 MG/DL (ref 70–99)
HCO3 SERPL-SCNC: 27 MMOL/L (ref 22–29)
HCT VFR BLD AUTO: 45.4 % (ref 35–47)
HGB BLD-MCNC: 15.1 G/DL (ref 11.7–15.7)
MCH RBC QN AUTO: 33.5 PG (ref 26.5–33)
MCHC RBC AUTO-ENTMCNC: 33.3 G/DL (ref 31.5–36.5)
MCV RBC AUTO: 101 FL (ref 78–100)
PLATELET # BLD AUTO: 259 10E3/UL (ref 150–450)
POTASSIUM SERPL-SCNC: 4.4 MMOL/L (ref 3.4–5.3)
RBC # BLD AUTO: 4.51 10E6/UL (ref 3.8–5.2)
SODIUM SERPL-SCNC: 139 MMOL/L (ref 135–145)
VIT B12 SERPL-MCNC: 778 PG/ML (ref 232–1245)
WBC # BLD AUTO: 6.1 10E3/UL (ref 4–11)

## 2024-08-05 PROCEDURE — 80048 BASIC METABOLIC PNL TOTAL CA: CPT | Performed by: NURSE PRACTITIONER

## 2024-08-05 PROCEDURE — 85027 COMPLETE CBC AUTOMATED: CPT | Performed by: NURSE PRACTITIONER

## 2024-08-05 PROCEDURE — 97140 MANUAL THERAPY 1/> REGIONS: CPT | Mod: GP | Performed by: PHYSICAL THERAPIST

## 2024-08-05 PROCEDURE — 99214 OFFICE O/P EST MOD 30 MIN: CPT | Performed by: NURSE PRACTITIONER

## 2024-08-05 PROCEDURE — 82607 VITAMIN B-12: CPT | Performed by: NURSE PRACTITIONER

## 2024-08-05 PROCEDURE — 36415 COLL VENOUS BLD VENIPUNCTURE: CPT | Performed by: NURSE PRACTITIONER

## 2024-08-05 ASSESSMENT — PATIENT HEALTH QUESTIONNAIRE - PHQ9
SUM OF ALL RESPONSES TO PHQ QUESTIONS 1-9: 0
SUM OF ALL RESPONSES TO PHQ QUESTIONS 1-9: 0

## 2024-08-05 NOTE — PATIENT INSTRUCTIONS
How to Take Your Medication Before Surgery  Preoperative Medication Instructions   Please hold your Eliquis for 48 hours before your procedure       Patient Education   Preparing for Your Surgery  Getting started  A nurse will call you to review your health history and instructions. They will give you an arrival time based on your scheduled surgery time. Please be ready to share:  Your doctor's clinic name and phone number  Your medical, surgical, and anesthesia history  A list of allergies and sensitivities  A list of medicines, including herbal treatments and over-the-counter drugs  Whether the patient has a legal guardian (ask how to send us the papers in advance)  Please tell us if you're pregnant--or if there's any chance you might be pregnant. Some surgeries may injure a fetus (unborn baby), so they require a pregnancy test. Surgeries that are safe for a fetus don't always need a test, and you can choose whether to have one.   If you have a child who's having surgery, please ask for a copy of Preparing for Your Child's Surgery.    Preparing for surgery  Within 10 to 30 days of surgery: Have a pre-op exam (sometimes called an H&P, or History and Physical). This can be done at a clinic or pre-operative center.  If you're having a , you may not need this exam. Talk to your care team.  At your pre-op exam, talk to your care team about all medicines you take. If you need to stop any medicines before surgery, ask when to start taking them again.  We do this for your safety. Many medicines can make you bleed too much during surgery. Some change how well surgery (anesthesia) drugs work.  Call your insurance company to let them know you're having surgery. (If you don't have insurance, call [phone].)  Call your clinic if there's any change in your health. This includes signs of a cold or flu (sore throat, runny nose, cough, rash, fever). It also includes a scrape or scratch near the surgery site.  If you  have questions on the day of surgery, call your hospital or surgery center.  Eating and drinking guidelines  For your safety: Unless your surgeon tells you otherwise, follow the guidelines below.  Eat and drink as usual until 8 hours before you arrive for surgery. After that, no food or milk.  Drink clear liquids until 2 hours before you arrive. These are liquids you can see through, like water, Gatorade, and Propel Water. They also include plain black coffee and tea (no cream or milk), candy, and breath mints. You can spit out gum when you arrive.  If you drink alcohol: Stop drinking it the night before surgery.  If your care team tells you to take medicine on the morning of surgery, it's okay to take it with a sip of water.  Preventing infection  Shower or bathe the night before and morning of your surgery. Follow the instructions your clinic gave you. (If no instructions, use regular soap.)  Don't shave or clip hair near your surgery site. We'll remove the hair if needed.  Don't smoke or vape the morning of surgery. You may chew nicotine gum up to 2 hours before surgery. A nicotine patch is okay.  Note: Some surgeries require you to completely quit smoking and nicotine. Check with your surgeon.  Your care team will make every effort to keep you safe from infection. We will:  Clean our hands often with soap and water (or an alcohol-based hand rub).  Clean the skin at your surgery site with a special soap that kills germs.  Give you a special gown to keep you warm. (Cold raises the risk of infection.)  Wear special hair covers, masks, gowns and gloves during surgery.  Give antibiotic medicine, if prescribed. Not all surgeries need antibiotics.  What to bring on the day of surgery  Photo ID and insurance card  Copy of your health care directive, if you have one  Glasses and hearing aids (bring cases)  You can't wear contacts during surgery  Inhaler and eye drops, if you use them (tell us about these when you  arrive)  CPAP machine or breathing device, if you use them  A few personal items, if spending the night  If you have . . .  A pacemaker, ICD (cardiac defibrillator) or other implant: Bring the ID card.  An implanted stimulator: Bring the remote control.  A legal guardian: Bring a copy of the certified (court-stamped) guardianship papers.  Please remove any jewelry, including body piercings. Leave jewelry and other valuables at home.  If you're going home the day of surgery  You must have a responsible adult drive you home. They should stay with you overnight as well.  If you don't have someone to stay with you, and you aren't safe to go home alone, we may keep you overnight. Insurance often won't pay for this.  After surgery  If it's hard to control your pain or you need more pain medicine, please call your surgeon's office.  Questions?   If you have any questions for your care team, list them here: _________________________________________________________________________________________________________________________________________________________________________ ____________________________________ ____________________________________ ____________________________________  For informational purposes only. Not to replace the advice of your health care provider. Copyright   2003, 2019 [facility]. All rights reserved. Clinically reviewed by Shira Ozuna MD. [employer] 518405 - REV 12/22.

## 2024-08-05 NOTE — PROGRESS NOTES
Preoperative Evaluation  [facility]  [address] [address]  Phone: [phone]  Fax: [phone]  Primary Provider: JULES Gillette Ra, CNP  Pre-op Performing Provider: JULES Gardner CNP  Aug 5, 2024             8/5/2024   Surgical Information   What procedure is being done? Seton replacement   Facility or Hospital where procedure/surgery will be performed: [facility]   Who is doing the procedure / surgery? Dr Jefferson - colorectal surgeons   Date of surgery / procedure: Aug 9   Time of surgery / procedure: 12:45 arrival   Where do you plan to recover after surgery? at home with family        Fax number for surgical facility: [facility]- [phone]      Assessment & Plan     The proposed surgical procedure is considered INTERMEDIATE risk.    Preop general physical exam  Anal fistula  This is a 78-year-old female with past medical history of obstructive sleep apnea treated with an appliance, anal fistula, pelvic floor dysfunction, incomplete voiding, Raynaud's, atrial fibrillation on anticoagulant presenting today for preop clearance of a seton replacement after it was displaced recently.  Patient has had this procedure multiple times.    She is instructed to hold her Eliquis for 2 days before procedure, this is a minor procedure and is not anticipation that there will be bleeding however she did have postop bleeding secondary to hemorrhoids last procedure.  Therefore holding for 2 days instead of 1    Chronic atrial fibrillation, unspecified (H)  Holding Eliquis for 2 days before procedure  - CBC with platelets  - Basic metabolic panel  (Ca, Cl, CO2, Creat, Gluc, K, Na, BUN)  - CBC with platelets  - Basic metabolic panel  (Ca, Cl, CO2, Creat, Gluc, K, Na, BUN)    Stage 3 chronic kidney disease, unspecified whether stage 3a or 3b CKD (H)  This problem has resolved.  Patient's kidney disease is stable and not impaired at this  time  BMP was rechecked today    On anticoagulant therapy  Baseline CBC and BMP checked  Discussed holding medications  - CBC with platelets  - Basic metabolic panel  (Ca, Cl, CO2, Creat, Gluc, K, Na, BUN)  - CBC with platelets  - Basic metabolic panel  (Ca, Cl, CO2, Creat, Gluc, K, Na, BUN)    Macrocytic red blood cells  CBC shows macrocytic and hyperchromic red blood cells without anemia.  Patient has had a history of abnormal B12.  Adding B12 onto the lab order from today.  Discussed this with patient today. patient to continue to follow with PCP.   Discussed that this is typically not concerning if not having any anemia at this time which she is not.  Verbalizes understanding agrees with plan of care         Risks and Recommendations  The patient has the following additional risks and recommendations for perioperative complications:  Cardiovascular:   - Patient A-fib managed without beta-blocker.  Discussed holding anticoagulant medication  Obstructive Sleep Apnea:   Uses an appliance    Antiplatelet or Anticoagulation Medication Instructions   - apixaban (Eliquis), edoxaban (Savaysa), rivaroxaban (Xarelto): Bleeding risk is moderate or high for this procedure AND CrCl  (>=) 50 mL/min. DO NOT TAKE 2 days before surgery.     Additional Medication Instructions  Patient is on no additional chronic medications    Recommendation  Approval given to proceed with proposed procedure, without further diagnostic evaluation.    Khushbu Merritt is a 78 year old, presenting for the following:  Pre-Op Exam (Seton replacement)    Patient with anal fistula, incomplete evacuation -currently using digital disimpaction for assistance, status post multiple seton replacement procedures, recently had seton fall out.  And now is scheduled to have this replaced in 4 days.  Patient currently has 5-8 bowel movements per day, each bowel movement with multiple forms of [place] score ranging in variety from 1-7, and multiple at the same  time, was patient with a history of internal and external hemorrhoids    Currently has no rectal bleeding or black tarry stools, no melena reported, denies pain with bowel movements.    Patient prefers natural treatment at all cost, does not want to have any rectal or anal incisions to repair current fistula.    Patient was previously diagnosed with atrial fibrillation in 2021 during a preop -currently taking Eliquis    CKD is in her problem list however creatinine and glomerular filtration rate recently checked in in April 2024 is normal          8/5/2024    11:59 AM   Additional Questions   Roomed by JESSIE CAMPOVERDE     HPI related to upcoming procedure: 5-8 BM/day  Has various 1-6 on scale   Has had for last 7 days.           8/5/2024   Pre-Op Questionnaire   Have you ever had a heart attack or stroke? No   Have you ever had surgery on your heart or blood vessels, such as a stent placement, a coronary artery bypass, or surgery on an artery in your head, neck, heart, or legs? No   Do you have chest pain with activity? No   Do you have a history of heart failure? No   Do you currently have a cold, bronchitis or symptoms of other infection? No   Do you have a cough, shortness of breath, or wheezing? No   Do you or anyone in your family have previous history of blood clots? No   Do you or does anyone in your family have a serious bleeding problem such as prolonged bleeding following surgeries or cuts? No   Have you ever had problems with anemia or been told to take iron pills? (!) YES - no currently    Have you had any abnormal blood loss such as black, tarry or bloody stools, or abnormal vaginal bleeding? No   Have you ever had a blood transfusion? No   Are you willing to have a blood transfusion if it is medically needed before, during, or after your surgery? Yes   Have you or any of your relatives ever had problems with anesthesia? No   Do you have sleep apnea, excessive snoring or daytime drowsiness? (!) YES   Do you have  a CPAP machine? (!) NO - uses an appliance    Do you have any artifical heart valves or other implanted medical devices like a pacemaker, defibrillator, or continuous glucose monitor? No   Do you have artificial joints? No   Are you allergic to latex? No        Health Care Directive  Patient does not have a Health Care Directive or Living Will: Patient states has Advance Directive and will bring in a copy to clinic.    Preoperative Review of    reviewed - no record of controlled substances prescribed.          Patient Active Problem List    Diagnosis Date Noted    Preop general physical exam 04/30/2024     Priority: Medium    Pelvic floor dysfunction in female 08/15/2022     Priority: Medium    Abdominal mass 06/13/2022     Priority: Medium    Disorder of pelvis 06/13/2022     Priority: Medium    Major depressive disorder, recurrent episode, in full remission (H24) 01/07/2022     Priority: Medium    CKD (chronic kidney disease), stage II 01/07/2022     Priority: Medium    Stage 3a chronic kidney disease (H) 01/07/2022     Priority: Medium    Atrial fibrillation, persistent (H) 10/15/2021     Priority: Medium    Longstanding persistent atrial fibrillation (H) 10/15/2021     Priority: Medium    Anal fistula 04/01/2021     Priority: Medium    Peripheral neuropathy, idiopathic 12/24/2020     Priority: Medium    Cerebral arterial aneurysm 12/16/2020     Priority: Medium    Blepharitis of both eyes 04/25/2020     Priority: Medium    Restless legs syndrome (RLS) 09/02/2018     Priority: Medium    Plantar fasciitis 09/02/2018     Priority: Medium    HPV (human papilloma virus) infection 09/02/2018     Priority: Medium    Raynaud's disease without gangrene 09/02/2018     Priority: Medium    SABRA (obstructive sleep apnea) 09/02/2018     Priority: Medium    Age-related osteoporosis without current pathological fracture 09/02/2018     Priority: Medium    Eczema, unspecified type 09/02/2018     Priority: Medium    Dyspepsia  09/02/2018     Priority: Medium    Near syncope 09/02/2018     Priority: Medium    Raynaud's phenomenon 09/02/2018     Priority: Medium    Syncope and collapse 09/02/2018     Priority: Medium    Osteoporosis without pathological fracture 09/02/2018     Priority: Medium     Formatting of this note might be different from the original.   Diagnosed in 2002 based on screening bone density with worst T-score of -2.5 at her lumbar spine.  T-score at lumbar spine in 2015 was -2.7 and in 2020 -3.1.  Left radius was also obtained in 2020 with T-score of -3.5.      Fracture history consists only of foot fracture which was result of having 150 pounds dropped on it.      No history renal stones.      She reports chronic digestive issues with malabsorption since around 2015.   She was treated with Prednisone therapy for skin conditions for 1 month on two separate occasions, no other significant history of glucococorticoid therapy.   No history of anticonvulsants, warfarin, or coumadin therapy.      She reports peripheral neuropathy in her feet.   Menopause in 1999.  She was on conventional hormone replacement therapy for 1 year, which resulted in hives.  Later treated with compounded bioidentical hormones for a year.      Denies alcohol or tobacco use.      No family history of osteoporosis or parental history of hip fracture.      No family history of osteoporosis or parental history of hip fracture.      Multiple nevi 07/24/2018     Priority: Medium    Cherry angioma 07/24/2018     Priority: Medium    Screening for skin cancer 07/24/2018     Priority: Medium    Hemangioma of skin 07/24/2018     Priority: Medium    Melanocytic nevus of skin 07/24/2018     Priority: Medium    CARDIOVASCULAR SCREENING; LDL GOAL LESS THAN 160 09/17/2015     Priority: Medium    High risk HPV infection 09/17/2015     Priority: Medium    Urticaria 09/22/2014     Priority: Medium     Problem list name updated by automated process. Provider to review       Yeast dermatitis 02/21/2014     Priority: Medium    Stress 02/21/2014     Priority: Medium    Candidiasis of skin and nail 02/21/2014     Priority: Medium    Mild major depression (H) 08/07/2013     Priority: Medium    Breast cancer screening 07/16/2012     Priority: Medium      Past Medical History:   Diagnosis Date    Ankylosing spondylitis (H)      HLAB19 +    Atrial fibrillation, persistent (H) 10/15/2021    CKD (chronic kidney disease), stage II 01/07/2022    Complication of anesthesia     Depressive disorder college, young adult    resolved for many years    Eosinophilic cellulitis     Mild major depression (H24)     Osteopenia     -2.1; Lumbar -2.5; Fem Neck     Sleep apnea     Has a Mouth appliance    Small intestinal bacterial overgrowth 05/2017    has had for years but finally diagnosed correctly May 2017    Vitamin deficiency      Past Surgical History:   Procedure Laterality Date    ARTHROSCOPY KNEE RT/LT Right 2004    BIOPSY  Dec. 2016    spongiotic dermatitis    CARDIAC SURGERY  01/24/2022    cardioversion     COLONOSCOPY  May 2015    normal    EXAM UNDER ANESTHESIA RECTUM N/A 3/15/2022    Procedure: Exam under anesthesia, biopsy of external lesion and placement of seton;  Surgeon: Bonnie Rodriguez MD;  Location: [facility] OR    EXAM UNDER ANESTHESIA RECTUM N/A 5/1/2024    Procedure: Exam under anesthesia, drainage of abscess, revision of seton to a teardrop configuration;  Surgeon: Bonnie Rodriguez MD;  Location:  OR    FOOT SURGERY Right 1995    due to foot injury    FOOT SURGERY Right 2000    ayers's neuroma    INCISION AND DRAINAGE RECTUM, COMBINED N/A 5/1/2024    Procedure: drainage of abcess, revision of seton;  Surgeon: Bonnie Rodriguez MD;  Location:  OR    LASER YAG CAPSULOTOMY Bilateral 12/29/15    PHACOEMULSIFICATION CLEAR CORNEA WITH STANDARD INTRAOCULAR LENS IMPLANT Right 10/1/14    PHACOEMULSIFICATION CLEAR CORNEA WITH STANDARD INTRAOCULAR LENS IMPLANT Left 10/08/14     "PLACEMENT OF SETON RECTUM N/A 5/1/2024    Procedure: PLACEMENT OF SETON RECTUM;  Surgeon: Bonnie Rodriguez MD;  Location: [facility] OR    SOFT TISSUE SURGERY  1995    multiple crush & fx's to right foot     Current Outpatient Medications   Medication Sig Dispense Refill    apixaban ANTICOAGULANT (ELIQUIS) 5 MG tablet Take 5 mg by mouth 2 times daily       CARTIA  MG 24 hr capsule Take 120 mg by mouth daily      cholecalciferol (VITAMIN D3) 125 MCG (5000 UT) TABS tablet Take 1 tablet by mouth daily      UNABLE TO FIND daily MEDICATION NAME: Butyrate 300 mg soft gel         Allergies   Allergen Reactions    Floraquin [Iodoquinol] Anaphylaxis     LOST SENSE OF SMELL AND TASTE    Quinolones      Other reaction(s): Other - Describe In Comment Field  Lost sense of smell.  Pt would prefer to not take any of the medications in Fluoroquninolone group of antibiotics.  Other reaction(s): Other (see comments)  Lost sense of smell.  Pt would prefer to not take any of the medications in Fluoroquninolone group of antibiotics.  Other reaction(s): Other - Describe In Comment Field    Cephalexin Rash    Cephalosporins Rash    Cortisone Rash    Keflex [Cephalexin Monohydrate] Rash    Penicillins Rash    Triamcinolone Rash     Pt reports rash starting on face then spreading to arms, legs, and truck a few days after a knee injection        Social History     Tobacco Use    Smoking status: Never     Passive exposure: Past    Smokeless tobacco: Never   Substance Use Topics    Alcohol use: Yes     Comment: once every several months       History   Drug Use No               Objective    /88 (BP Location: Right arm, Patient Position: Sitting, Cuff Size: Adult Regular)   Pulse 80   Temp 98.1  F (36.7  C) (Oral)   Resp 12   Ht 1.654 m (5' 5.12\")   Wt 60.9 kg (134 lb 3.2 oz)   LMP  (LMP Unknown)   SpO2 99%   BMI 22.25 kg/m     Estimated body mass index is 22.25 kg/m  as calculated from the following:    Height as of this " "encounter: 1.654 m (5' 5.12\").    Weight as of this encounter: 60.9 kg (134 lb 3.2 oz).  Physical Exam  GENERAL: alert and no distress  EYES: Eyes grossly normal to inspection, PERRL and conjunctivae and sclerae normal  HENT: ear canals and TM's normal, nose and mouth without ulcers or lesions  NECK: no adenopathy, no asymmetry, masses, or scars  RESP: lungs clear to auscultation - no rales, rhonchi or wheezes  CV: regular rate and rhythm, normal S1 S2, no S3 or S4, no murmur, click or rub, no peripheral edema  ABDOMEN: Soft, generalized tenderness in all 4 quadrants, no hepatosplenomegaly, no masses and bowel sounds hyperactive  MS: no gross musculoskeletal defects noted, no edema  SKIN: no suspicious lesions or rashes  NEURO: Normal strength and tone, mentation intact and speech normal  PSYCH: mentation appears normal, affect normal/bright    Recent Labs   Lab Test 05/05/24  2103 04/30/24  1411   HGB 15.5 14.9    265   * 137   POTASSIUM 4.1 4.2   CR 0.85 0.81        Diagnostics  Results for orders placed or performed in visit on 08/05/24   CBC with platelets     Status: Abnormal   Result Value Ref Range    WBC Count 6.1 4.0 - 11.0 10e3/uL    RBC Count 4.51 3.80 - 5.20 10e6/uL    Hemoglobin 15.1 11.7 - 15.7 g/dL    Hematocrit 45.4 35.0 - 47.0 %     (H) 78 - 100 fL    MCH 33.5 (H) 26.5 - 33.0 pg    MCHC 33.3 31.5 - 36.5 g/dL    RDW 13.1 10.0 - 15.0 %    Platelet Count 259 150 - 450 10e3/uL          Signed Electronically by: JULES Gardner CNP  A copy of this evaluation report is provided to the requesting physician.         Answers submitted by the patient for this visit:  Patient Health Questionnaire (Submitted on 8/5/2024)  PHQ9 TOTAL SCORE: 0    "

## 2024-08-06 NOTE — RESULT ENCOUNTER NOTE
Normal B12.  This is not the cause of larger red blood cells.  As we discussed, you're current labs aren't particularly concerning at this time as it's fairly consistent with your past labs and you have no anemia, however, If you have further questions or want to pursue more work up, I would talk to your PCP.     Your basic metabolic panel shows your kidney function and electrolytes (sodium and potassium) were normal.    Please let me know if you have any questions or concerns.    Thank you for trusting us with your care. It was a pleasure seeing you.  JULES Gardner CNP on 8/6/2024 at 4:34 PM

## 2024-08-16 ENCOUNTER — MYC MEDICAL ADVICE (OUTPATIENT)
Dept: FAMILY MEDICINE | Facility: CLINIC | Age: 79
End: 2024-08-16
Payer: COMMERCIAL

## 2024-08-16 ENCOUNTER — MYC MEDICAL ADVICE (OUTPATIENT)
Dept: PEDIATRICS | Facility: CLINIC | Age: 79
End: 2024-08-16
Payer: COMMERCIAL

## 2024-08-16 NOTE — TELEPHONE ENCOUNTER
LVM and MARIA D letting the pt know that the IBS results/form has been placed in the outgoing mail as request by the Pt.     Mayra Bowers   [facility]

## 2024-08-16 NOTE — TELEPHONE ENCOUNTER
Sending to the nurses to go over results from pt's IBS results. See messages below.     Mayra Bowers   [facility]

## 2024-08-19 ENCOUNTER — TRANSFERRED RECORDS (OUTPATIENT)
Dept: HEALTH INFORMATION MANAGEMENT | Facility: CLINIC | Age: 79
End: 2024-08-19
Payer: COMMERCIAL

## 2024-08-20 ENCOUNTER — THERAPY VISIT (OUTPATIENT)
Dept: PHYSICAL THERAPY | Facility: CLINIC | Age: 79
End: 2024-08-20
Payer: COMMERCIAL

## 2024-08-20 DIAGNOSIS — M62.89 PELVIC FLOOR DYSFUNCTION IN FEMALE: Primary | ICD-10-CM

## 2024-08-20 PROCEDURE — 97140 MANUAL THERAPY 1/> REGIONS: CPT | Mod: GP | Performed by: PHYSICAL THERAPIST

## 2024-08-20 NOTE — PROGRESS NOTES
08/20/24 0500   Appointment Info   Signing clinician's name / credentials Coral Bowman, PT, OCS   Total/Authorized Visits EPIC 04/09/24   Visits Used 9   Medical Diagnosis Pelvic floor dysfunction in female (M62.89)   PT Tx Diagnosis incomplete emptying bowels, frequency   Other pertinent information hx seton/anal fistulas/abcesses   Progress Note/Certification   Start of Care Date 05/28/24   Onset of illness/injury or Date of Surgery 04/09/24  (order date)   Therapy Frequency 2x month   Predicted Duration 3 months   Certification date from 08/26/24   Certification date to 11/17/24   Progress Note Due Date 07/28/24   Progress Note Completed Date 08/20/24   GOALS   PT Goals 2   PT Goal 1   Goal Identifier Report  complete bowel evacuation 1-2 xday   Rationale to maximize safety and independence with performance of ADLs and functional tasks;to maximize safety and independence within the home;to maximize safety and independence within the community;to maximize safety and independence with transportation;to maximize safety and independence with self cares   Target Date 11/17/24   Subjective Report   Subjective Report Had Seton replaced and went pretty well. Was her 4th one and 4th abcess.Met with fistula colorectal surgeon yesterday. Results of defecography showed non-relaxation of puborectalis mm.Discussed possible Botox injection or continued PT, possible dry needling.   Objective Measures   Objective Measures Objective Measure 1;Objective Measure 2   Objective Measure 1   Objective Measure Kegel strength WNL, good relaxation   Objective Measure 2   Objective Measure TTP B puborectalis, within ~ 5-7 minutes resolves to ~ 85% WNL.   Treatment Interventions (PT)   Interventions Manual Therapy   Therapeutic Procedure/Exercise   Ther Proc 1 Happy baby 30 sec x 3 reps   PTRx Ther Proc 1 Supine Butterfly   PTRx Ther Proc 1 - Details 30 sec x 3, 2 x day   PTRx Ther Proc 2 Pretzel Stretch   PTRx Ther Proc 2 -  Details 20 sec x 3, 2 x day   Skilled Intervention to encourage pelvic floor relaxation   Patient Response/Progress excellent understanding.   Therapeutic Activity   Therapeutic Activities Ther Act 2   Ther Act 1 Urge Incontinence Suppression Techniques   Ther Act 1 - Details throughout day for urge of bowel or bladder   Ther Act 2 educated in POC and normal pelvic floor anatomy/function   PTRx Ther Act 1 Proper Defecation Techniques   PTRx Ther Act 1 - Details rev'd techniques, big belly exhale   Skilled Intervention for education in normal bowel/bladder function   Patient Response/Progress good understanding   Manual Therapy   Manual Therapy: Mobilization, MFR, MLD, friction massage minutes (16183) 30   Manual Therapy 1 supine to puborectalis/LA/OI, gentle to start but able to tolerate mod pressure.   Skilled Intervention to decrease PF tone   Patient Response/Progress good response to MFR   Education   Learner/Method Patient;No Barriers to Learning   Plan   Home program PTRX HEP   Plan for next session continue ~ every 2 weeks, consider dry needling.   Total Session Time   Timed Code Treatment Minutes 30   Total Treatment Time (sum of timed and untimed services) 30         [facility]                                                                                   OUTPATIENT PHYSICAL THERAPY    PLAN OF TREATMENT FOR OUTPATIENT REHABILITATION   Patient's Last Name, First Name, KATT CalixtoShaina  TASHIA YOB: 1945   Provider's Name   [facility]   Medical Record No.  [id number]     Onset Date: 04/09/24 (order date)  Start of Care Date: 05/28/24     Medical Diagnosis:  Pelvic floor dysfunction in female (M62.89)      PT Treatment Diagnosis:  incomplete emptying bowels, frequency Plan of Treatment  Frequency/Duration: 2x month/ 3 months    Certification date from 08/26/24 to 11/17/24         See note for plan of treatment details and functional  goals     Larisa Dumont, PT, OCS                        I CERTIFY THE NEED FOR THESE SERVICES FURNISHED UNDER        THIS PLAN OF TREATMENT AND WHILE UNDER MY CARE     (Physician attestation of this document indicates review and certification of the therapy plan).              Referring Provider:  Ami Aguilar    Initial Assessment  See Epic Evaluation- Start of Care Date: 05/28/24

## 2024-08-21 NOTE — TELEPHONE ENCOUNTER
Printed result and placed at  in drawer for pt to .    Madiha Valentin RN, BSN  [facility]

## 2024-08-21 NOTE — TELEPHONE ENCOUNTER
Patient calling, states the letter she received in the mail was NOT the final report. Please print the Final Report of IBS Smart lab result, place in envelope at the .     Paul MUELLER RN 8/21/2024 at 4:20 PM

## 2024-08-27 ENCOUNTER — TELEPHONE (OUTPATIENT)
Dept: DERMATOLOGY | Facility: CLINIC | Age: 79
End: 2024-08-27

## 2024-08-27 ENCOUNTER — THERAPY VISIT (OUTPATIENT)
Dept: PHYSICAL THERAPY | Facility: CLINIC | Age: 79
End: 2024-08-27
Payer: COMMERCIAL

## 2024-08-27 DIAGNOSIS — M62.89 PELVIC FLOOR DYSFUNCTION IN FEMALE: Primary | ICD-10-CM

## 2024-08-27 PROCEDURE — 97140 MANUAL THERAPY 1/> REGIONS: CPT | Mod: GP | Performed by: PHYSICAL THERAPIST

## 2024-08-27 PROCEDURE — 97530 THERAPEUTIC ACTIVITIES: CPT | Mod: GP | Performed by: PHYSICAL THERAPIST

## 2024-08-27 NOTE — TELEPHONE ENCOUNTER
[facility] Call Center    Phone Message    May a detailed message be left on voicemail: yes     Reason for Call: Symptoms or Concerns     If patient has red-flag symptoms, warm transfer to triage line    Current symptom or concern: Spreading, red rash    Symptoms have been present for:  3 week(s)    Has patient previously been seen for this? No    By : NA    Date: NA    Are there any new or worsening symptoms? Yes: New - Pt requests Dr. Singh or Whit Magallanes. Please call back to discuss. Please call [phone] or her cell at [phone] if unable to reach at the [phone]. Thank you.     Action Taken: Message routed to:  [facility] Derm    Travel Screening: Not Applicable     Date of Service:

## 2024-09-18 NOTE — ANESTHESIA CARE TRANSFER NOTE
Patient: Shaina Calixto    Procedure: Procedure(s):  EXAM UNDER ANESTHESIA, PERIANAL BIOPSY  PLACEMENT OF SETON       Diagnosis: Anal fistula [K60.3]  Diagnosis Additional Information: No value filed.    Anesthesia Type:   MAC     Note:    Oropharynx: oropharynx clear of all foreign objects and spontaneously breathing  Level of Consciousness: awake  Oxygen Supplementation: room air    Independent Airway: airway patency satisfactory and stable  Dentition: dentition unchanged  Vital Signs Stable: post-procedure vital signs reviewed and stable  Report to RN Given: handoff report given  Patient transferred to: Phase II    Handoff Report: Identifed the Patient, Identified the Reponsible Provider, Reviewed the pertinent medical history, Discussed the surgical course, Reviewed Intra-OP anesthesia mangement and issues during anesthesia, Set expectations for post-procedure period and Allowed opportunity for questions and acknowledgement of understanding      Vitals:  Vitals Value Taken Time   BP     Temp     Pulse     Resp     SpO2         Electronically Signed By: JULES Manzo CRNA  March 15, 2022  3:06 PM   Traveling alone

## 2024-09-23 ENCOUNTER — THERAPY VISIT (OUTPATIENT)
Dept: PHYSICAL THERAPY | Facility: CLINIC | Age: 79
End: 2024-09-23
Payer: COMMERCIAL

## 2024-09-23 DIAGNOSIS — M62.89 PELVIC FLOOR DYSFUNCTION IN FEMALE: Primary | ICD-10-CM

## 2024-09-23 PROCEDURE — 97530 THERAPEUTIC ACTIVITIES: CPT | Mod: GP | Performed by: PHYSICAL THERAPIST

## 2024-09-23 PROCEDURE — 97140 MANUAL THERAPY 1/> REGIONS: CPT | Mod: GP | Performed by: PHYSICAL THERAPIST

## 2024-09-27 ENCOUNTER — TELEPHONE (OUTPATIENT)
Dept: SURGERY | Facility: CLINIC | Age: 79
End: 2024-09-27
Payer: COMMERCIAL

## 2024-09-27 NOTE — TELEPHONE ENCOUNTER
Health Call Center    Phone Message    May a detailed message be left on voicemail: yes     Reason for Call: Other: PT calling in seeking more info on Dr. Klein's service specifically. She was told he works with a physical therapist and does needling of pelvic floor dysfunction.  PT also has IBS/SIBO/Transsphinteric fistula.       PT currently sees a pelvic floor dysfunction specialist, and 2 colorectal surgeons @ [facility] (Dr. Rodriguez/Dr. Teague)    PT is seeking care with Dr. Klein. Writer did let PT know that this is a surgical seeking clinic.     Please call patient to assist further. Thank you!     Action Taken: Message routed to:  [facility] Colorectal     Travel Screening: Not Applicable     Date of Service:

## 2024-09-30 ENCOUNTER — THERAPY VISIT (OUTPATIENT)
Dept: PHYSICAL THERAPY | Facility: CLINIC | Age: 79
End: 2024-09-30
Payer: COMMERCIAL

## 2024-09-30 DIAGNOSIS — M62.89 PELVIC FLOOR DYSFUNCTION IN FEMALE: Primary | ICD-10-CM

## 2024-09-30 PROCEDURE — 97140 MANUAL THERAPY 1/> REGIONS: CPT | Mod: GP | Performed by: PHYSICAL THERAPIST

## 2024-10-01 NOTE — CONFIDENTIAL NOTE
Spoke with Shaina and answered her questions regarding pelvic floor therapy and fistula surgical options.  She has a transsphincteric fistula + also pelvic floor issues. She has had a second opinion with [facility]. She states she see's Dr Rodriguez and Dr Teague with [facility], and doesn't need a surgical opinion. Nothing scheduled per patient's request.

## 2024-10-07 ENCOUNTER — THERAPY VISIT (OUTPATIENT)
Dept: PHYSICAL THERAPY | Facility: CLINIC | Age: 79
End: 2024-10-07
Payer: COMMERCIAL

## 2024-10-07 ENCOUNTER — TRANSFERRED RECORDS (OUTPATIENT)
Dept: HEALTH INFORMATION MANAGEMENT | Facility: CLINIC | Age: 79
End: 2024-10-07

## 2024-10-07 DIAGNOSIS — M62.89 PELVIC FLOOR DYSFUNCTION IN FEMALE: Primary | ICD-10-CM

## 2024-10-07 PROCEDURE — 97140 MANUAL THERAPY 1/> REGIONS: CPT | Mod: GP | Performed by: PHYSICAL THERAPIST

## 2024-10-10 ENCOUNTER — MEDICAL CORRESPONDENCE (OUTPATIENT)
Dept: HEALTH INFORMATION MANAGEMENT | Facility: CLINIC | Age: 79
End: 2024-10-10

## 2024-10-10 ENCOUNTER — TELEPHONE (OUTPATIENT)
Dept: FAMILY MEDICINE | Facility: CLINIC | Age: 79
End: 2024-10-10
Payer: COMMERCIAL

## 2024-10-10 DIAGNOSIS — K63.8219 SMALL INTESTINAL BACTERIAL OVERGROWTH (SIBO): Primary | ICD-10-CM

## 2024-10-10 NOTE — TELEPHONE ENCOUNTER
Placed in provider's basket for review and signature.     Nathalia Figueroa  Lead   [facility]

## 2024-10-10 NOTE — TELEPHONE ENCOUNTER
Patient calling requesting a form be filled out.     She will drop it off at the clinic today. The form is requesting a prescription for 10g/15mL solution of Lactulose for patient's Trio smart breath test for SIBO. This test looks at all three gases and is the only test that does.     Patient reports provider has filled out requisition forms in the past, so that insurance can be billed rather than patient paying out of pocket.     Patient requesting form be completed by provider. She would like to  the written prescription with the completed form to mail in together.     Routing to primary care pool, so they can watch for form and send to provider.     Sherri Webber RN 10/10/2024 10:16 AM  [facility]

## 2024-10-10 NOTE — TELEPHONE ENCOUNTER
Forms/Letter Request    Type of form/letter: Tri Smart Breath Test       Do we have the form/letter: Yes:     Who is the form from? Patient    Where did/will the form come from? Patient or family brought in       When is form/letter needed by: ASAP    How would you like the form/letter returned:     Patient Notified form requests are processed in 5-7 business days:Yes    Could we send this information to you in [facility] or would you prefer to receive a phone call?:   Patient would prefer a phone call   Okay to leave a detailed message?: Yes at Cell number on file:    Telephone Information:        Home   Maria L [facility] Patient Representative

## 2024-10-14 ENCOUNTER — MEDICAL CORRESPONDENCE (OUTPATIENT)
Dept: HEALTH INFORMATION MANAGEMENT | Facility: CLINIC | Age: 79
End: 2024-10-14

## 2024-10-14 ENCOUNTER — THERAPY VISIT (OUTPATIENT)
Dept: PHYSICAL THERAPY | Facility: CLINIC | Age: 79
End: 2024-10-14
Payer: COMMERCIAL

## 2024-10-14 DIAGNOSIS — M62.89 PELVIC FLOOR DYSFUNCTION IN FEMALE: Primary | ICD-10-CM

## 2024-10-14 PROCEDURE — 20560 NDL INSJ W/O NJX 1 OR 2 MUSC: CPT | Mod: GA | Performed by: PHYSICAL THERAPIST

## 2024-10-14 RX ORDER — LACTULOSE 10 G/15ML
10 SOLUTION ORAL ONCE
Qty: 15 ML | Refills: 0 | Status: SHIPPED | OUTPATIENT
Start: 2024-10-14 | End: 2024-10-14

## 2024-10-14 NOTE — TELEPHONE ENCOUNTER
Signed. Patient called and notified formed has been mailed. and   Forms/Letter ready for     Patient called and notified form/letter is ready for .  Current location of form: Placed at    Nathalia Figueroa

## 2024-10-15 NOTE — TELEPHONE ENCOUNTER
"She stated the \"written\" prescription was missing, I told her the script was sent the [facility]    Maria L [facility] Patient Representative   "

## 2024-10-15 NOTE — PROGRESS NOTES
10/14/24 0500   Appointment Info   Signing clinician's name / credentials Desiree Paul, MPT, PRPC   Total/Authorized Visits EPIC 04/09/24   Visits Used 14   Medical Diagnosis Pelvic floor dysfunction in female (M62.89)   PT Tx Diagnosis incomplete emptying bowels, frequency   Other pertinent information hx seton/anal fistulas/abcesses   Progress Note/Certification   Start of Care Date 05/28/24   Onset of illness/injury or Date of Surgery 04/09/24  (order date)   Therapy Frequency 2x month   Predicted Duration 3 months   Certification date from 08/26/24   Certification date to 11/17/24   Progress Note Due Date 07/28/24   Progress Note Completed Date 08/20/24   PT Goal 1   Goal Identifier Report complete bowel evacuation 1-2 xday   Rationale to maximize safety and independence with performance of ADLs and functional tasks;to maximize safety and independence within the home;to maximize safety and independence within the community;to maximize safety and independence with transportation;to maximize safety and independence with self cares   Target Date 11/17/24   Subjective Report   Subjective Report Pt had seton removde about 1 week ago which was very emotional for her. She feels like she has excellent care at [facility] so was hard to go against what they recommended. It was just very irritating to her and felt like there were twist ties in between her legs. Wasn't preventing abcesses as she had 3 of them while she had it in place. Since getting the seton out, patient is much more comfortable. Is able to walk with a greater stride. Had CT scan 4 weeks ago and that showed she had no abcess, no hidden abcess. Was wondering over the weekend if she was developing another abcess. Talked to her acupuncturist and started a natual antibiotic and has been feeling better since then. Has been using Yincare which is an herbal supplement of 14 herbs that goes into the bath water after a bowel movement. Eating very clean  right now as well. after she had the seton out, she noticed a little less drainage but after starting on the natual antibiotics, she has noticed that there is more drainage now. Has been doing these hot baths 5-7 times/day and also using a heating pad on the area.   Objective Measures   Objective Measures Objective Measure 1;Objective Measure 2   Objective Measure 1   Objective Measure Palpation   Details Tension noted in B LB paraspinals and multifidi as well as sacral multifidi and gluts.   PT Modalities   PT Modalities Dry Needling   Dry Needling   Dry Needling, Insertion 1-2 Muscles Minutes (20650) 4   Patient Response/Progress Pt tolerated well.   Number of tissues 1;2   Tissue - 1 B sacral multifidi   Needle Thickness - 1 .25   Needle Depth - 1 30 mm   Needle Count - 1 1 eaci side   Position - 1 Prone   Technique - 1 with stim to thumping with alternating frequency   Tissue - 2 B lumbar multifidi   Needle Thickness - 2 .30   Needle Depth - 2 55 mm   Needle Count - 2 1 each side   Position - 2 Prone   Technique - 2 with stim to thumping with alternating frequency   Adverse reactions to treatment No   Plan   Updates to plan of care Trial of dry needling to increase blood flow for healing and decrease overall muscle tension.   Plan for next session Assess tolerance to dry needling.   Total Session Time   Total Treatment Time (sum of timed and untimed services) 4         PLAN  Continue therapy per current plan of care. Add dry needling to improve blood flow for healing and decrease overall tension. Did receive approval from colorectal MD to proceed with dry needling as well as internal rectal manual work. Will add that at next visit if tolerated.     Beginning/End Dates of Progress Note Reporting Period:  08/20/24 to 10/14/2024    Referring Provider:  Ami Aguilar

## 2024-10-16 ENCOUNTER — TELEPHONE (OUTPATIENT)
Dept: SLEEP MEDICINE | Facility: CLINIC | Age: 79
End: 2024-10-16

## 2024-10-16 NOTE — TELEPHONE ENCOUNTER
Reason for Call:  Appointment Request    Patient requesting this type of appt:  New sleep pt consult    Requested provider:  Dr. Chester    Reason patient unable to be scheduled: Needs to be scheduled by clinic    When does patient want to be seen/preferred time:  ASAP    Comments: Not showing any openings, new pt     Could we send this information to you in [facility] or would you prefer to receive a phone call?:   Patient would prefer a phone call   Okay to leave a detailed message?: Yes at Cell number on file:    Telephone Information:   Mobile [phone]       Call taken on 10/16/2024 at 10:42 AM by Nilda Ryan

## 2024-10-19 ENCOUNTER — MYC MEDICAL ADVICE (OUTPATIENT)
Dept: FAMILY MEDICINE | Facility: CLINIC | Age: 79
End: 2024-10-19
Payer: COMMERCIAL

## 2024-10-19 DIAGNOSIS — G47.33 OSA (OBSTRUCTIVE SLEEP APNEA): Primary | ICD-10-CM

## 2024-10-21 ENCOUNTER — THERAPY VISIT (OUTPATIENT)
Dept: PHYSICAL THERAPY | Facility: CLINIC | Age: 79
End: 2024-10-21
Payer: COMMERCIAL

## 2024-10-21 DIAGNOSIS — M62.89 PELVIC FLOOR DYSFUNCTION IN FEMALE: Primary | ICD-10-CM

## 2024-10-21 PROCEDURE — 97530 THERAPEUTIC ACTIVITIES: CPT | Mod: GP | Performed by: PHYSICAL THERAPIST

## 2024-10-21 PROCEDURE — 97140 MANUAL THERAPY 1/> REGIONS: CPT | Mod: GP | Performed by: PHYSICAL THERAPIST

## 2024-10-21 NOTE — TELEPHONE ENCOUNTER
Please see [email or web address] message and advise. Order started, edit as needed.    Sherri Webber RN 10/21/2024 10:37 AM  [facility]

## 2024-10-22 ENCOUNTER — THERAPY VISIT (OUTPATIENT)
Dept: PHYSICAL THERAPY | Facility: CLINIC | Age: 79
End: 2024-10-22
Payer: COMMERCIAL

## 2024-10-22 DIAGNOSIS — M62.89 PELVIC FLOOR DYSFUNCTION IN FEMALE: Primary | ICD-10-CM

## 2024-10-22 PROCEDURE — 20561 NDL INSJ W/O NJX 3+ MUSC: CPT | Performed by: PHYSICAL THERAPIST

## 2024-10-30 ENCOUNTER — THERAPY VISIT (OUTPATIENT)
Dept: PHYSICAL THERAPY | Facility: CLINIC | Age: 79
End: 2024-10-30
Payer: COMMERCIAL

## 2024-10-30 DIAGNOSIS — M62.89 PELVIC FLOOR DYSFUNCTION IN FEMALE: Primary | ICD-10-CM

## 2024-10-30 PROCEDURE — 20561 NDL INSJ W/O NJX 3+ MUSC: CPT | Mod: GA | Performed by: PHYSICAL THERAPIST

## 2024-11-07 ENCOUNTER — THERAPY VISIT (OUTPATIENT)
Dept: PHYSICAL THERAPY | Facility: CLINIC | Age: 79
End: 2024-11-07
Payer: COMMERCIAL

## 2024-11-07 DIAGNOSIS — M62.89 PELVIC FLOOR DYSFUNCTION IN FEMALE: Primary | ICD-10-CM

## 2024-11-07 PROCEDURE — 20561 NDL INSJ W/O NJX 3+ MUSC: CPT | Mod: GZ | Performed by: PHYSICAL THERAPIST

## 2024-11-14 ENCOUNTER — THERAPY VISIT (OUTPATIENT)
Dept: PHYSICAL THERAPY | Facility: CLINIC | Age: 79
End: 2024-11-14
Payer: COMMERCIAL

## 2024-11-14 DIAGNOSIS — M62.89 PELVIC FLOOR DYSFUNCTION IN FEMALE: Primary | ICD-10-CM

## 2024-11-15 NOTE — PROGRESS NOTES
11/14/24 0500   Appointment Info   Signing clinician's name / credentials Desiree Paul, MPT, PRPC   Total/Authorized Visits EPIC 04/09/24   Visits Used 19   Medical Diagnosis Pelvic floor dysfunction in female (M62.89)   PT Tx Diagnosis incomplete emptying bowels, frequency   Precautions/Limitations Pt late   Other pertinent information hx seton/anal fistulas/abcesses   Progress Note/Certification   Start of Care Date 05/28/24   Onset of illness/injury or Date of Surgery 04/09/24  (order date)   Therapy Frequency 1x/wk   Predicted Duration 12 weeks   Certification date from 11/14/24   Certification date to 02/06/25   Progress Note Due Date 02/06/25   Progress Note Completed Date 08/20/24   PT Goal 1   Goal Identifier Report complete bowel evacuation 1-2 xday   Rationale to maximize safety and independence with performance of ADLs and functional tasks;to maximize safety and independence within the home;to maximize safety and independence within the community;to maximize safety and independence with transportation;to maximize safety and independence with self cares   Goal Progress # f bowel movements depends on what she eats and is around 5 right now. Continues to not feel like she can empty without manual impaction.   Target Date 11/17/24   Subjective Report   Subjective Report Pt reports she's feeling about the same overall. Continues to feel like the EAS isn't relaxing. Fistula seems to be OK overall. Hasn't been going to [facility] for manual work on the pelvic floor. She is wondering if stopping the manual work has been missed. GI said today patient may need to have ongoing pelvic floor work. Feels like when she was doing regular MFR through pelvic floor, she was able to relax the EAS better and now that's not happening as easy as it had been before.   Objective Measure 2   Objective Measure Visualization   Details Appears fistula track is still open however no evidence of abcess or redness noted today.   PT  Modalities   PT Modalities Dry Needling   Dry Needling   Dry Needling, Insertion 3 or more muscles (20651) 4   Number of tissues 1;2;3;4   Tissue - 1 L semimembranosis   Needle Thickness - 1 .30   Needle Depth - 1 60 mm   Needle Count - 1 1   Position - 1 Sidelying   Technique - 1 with stim to thumping with alternating frequency   Tissue - 2 L glut med   Needle Thickness - 2 .30   Needle Depth - 2 50 mm   Needle Count - 2 1   Position - 2 Sidelying   Technique - 2 with stim to thumping with alternating frequency   Tissue - 3 R STP   Needle Thickness - 3 .25   Needle Depth - 3 40 mm   Needle Count - 3 1   Position - 3 Sidelying   Technique - 3 with stim to thumping with alternating frequency   Adverse reaction comments No   Patient Response/Progress Tolerated well today.   Tissue - 4 R puborectalis   Needle Thickness - 4 .30   Needle Depth - 4 50 mm   Needle Count - 4 1   Position - 4 Sidelying   Technique - 4 with stim to thumping with alternating frequency   Plan   Home program Continue with current HEP.   Updates to plan of care Recommend patient continue with PT 1x/wk for 12 weeks. Recommend re-adding manual work as adjunct to dry needling since it appears EAS and PF have tightened up desite continued dry needling.   Plan for next session Assess tolerance to change in dry needling placement. Did stay away from L side where fistula is.   Total Session Time   Total Treatment Time (sum of timed and untimed services) 4         [facility]                                                                                   OUTPATIENT PHYSICAL THERAPY    PLAN OF TREATMENT FOR OUTPATIENT REHABILITATION   Patient's Last Name, First Name, Shaina Talamantes YOB: 1945   Provider's Name   [facility]   Medical Record No.  [id number]     Onset Date: 04/09/24 (order date)  Start of Care Date: 05/28/24     Medical Diagnosis:  Pelvic floor dysfunction  in female (M62.89)      PT Treatment Diagnosis:  incomplete emptying bowels, frequency Plan of Treatment  Frequency/Duration: 1x/wk/ 12 weeks    Certification date from 11/14/24 to 02/06/25         See note for plan of treatment details and functional goals     Desiree Paul, PT                         I CERTIFY THE NEED FOR THESE SERVICES FURNISHED UNDER        THIS PLAN OF TREATMENT AND WHILE UNDER MY CARE     (Physician attestation of this document indicates review and certification of the therapy plan).              Referring Provider:  Ami Aguilar    Initial Assessment  See Epic Evaluation- Start of Care Date: 05/28/24            PLAN  Continue therapy per current plan of care.    Beginning/End Dates of Progress Note Reporting Period:  08/20/24 to 11/14/2024    Referring Provider:  Referred Self

## 2024-11-18 DIAGNOSIS — R14.0 ABDOMINAL BLOATING: ICD-10-CM

## 2024-11-18 DIAGNOSIS — M62.89 PELVIC FLOOR DYSFUNCTION: ICD-10-CM

## 2024-11-18 DIAGNOSIS — K58.2 IRRITABLE BOWEL SYNDROME WITH CONSTIPATION AND DIARRHEA: Primary | ICD-10-CM

## 2024-11-19 DIAGNOSIS — N18.31 STAGE 3A CHRONIC KIDNEY DISEASE (H): ICD-10-CM

## 2024-11-19 DIAGNOSIS — R14.0 ABDOMINAL BLOATING: ICD-10-CM

## 2024-11-19 DIAGNOSIS — K58.2 IRRITABLE BOWEL SYNDROME WITH CONSTIPATION AND DIARRHEA: Primary | ICD-10-CM

## 2024-11-19 DIAGNOSIS — M62.89 PELVIC FLOOR DYSFUNCTION: ICD-10-CM

## 2024-11-19 DIAGNOSIS — N19 RENAL FAILURE: ICD-10-CM

## 2024-11-25 ENCOUNTER — THERAPY VISIT (OUTPATIENT)
Dept: PHYSICAL THERAPY | Facility: CLINIC | Age: 79
End: 2024-11-25
Payer: COMMERCIAL

## 2024-11-25 DIAGNOSIS — M62.89 PELVIC FLOOR DYSFUNCTION IN FEMALE: Primary | ICD-10-CM

## 2024-11-25 DIAGNOSIS — I65.23 CAROTID ARTERY PLAQUE, BILATERAL: Primary | ICD-10-CM

## 2024-11-27 NOTE — TELEPHONE ENCOUNTER
Dr. Chester is not accepting new regular sleep patients. His practice is focusing specifically on RBD patients. Patient is scheduled as a new patient with Shandra Victoria PA-C. On wait list.  Maryellen Mcgill CMA

## 2024-12-05 ENCOUNTER — THERAPY VISIT (OUTPATIENT)
Dept: PHYSICAL THERAPY | Facility: CLINIC | Age: 79
End: 2024-12-05
Payer: COMMERCIAL

## 2024-12-05 DIAGNOSIS — M62.89 PELVIC FLOOR DYSFUNCTION IN FEMALE: Primary | ICD-10-CM

## 2024-12-09 ENCOUNTER — ANCILLARY PROCEDURE (OUTPATIENT)
Dept: GENERAL RADIOLOGY | Facility: CLINIC | Age: 79
End: 2024-12-09
Attending: INTERNAL MEDICINE
Payer: COMMERCIAL

## 2024-12-09 DIAGNOSIS — M62.89 PELVIC FLOOR DYSFUNCTION: ICD-10-CM

## 2024-12-09 DIAGNOSIS — R14.0 ABDOMINAL BLOATING: ICD-10-CM

## 2024-12-09 DIAGNOSIS — K58.2 IRRITABLE BOWEL SYNDROME WITH BOTH CONSTIPATION AND DIARRHEA: ICD-10-CM

## 2024-12-09 PROCEDURE — 74018 RADEX ABDOMEN 1 VIEW: CPT | Mod: TC | Performed by: RADIOLOGY

## 2024-12-09 NOTE — PROGRESS NOTES
[facility] [place]  [address] [place], [address] ~ [phone]                Infant Custody Release   8/20/2021    Girl Yanna           Admission Information     Date & Time  8/20/2021 Provider  Rukhsana Lira MD Department  [facility] results faxed to [facility], fax # [phone]    Elodia FELIX)

## 2024-12-10 ENCOUNTER — TRANSFERRED RECORDS (OUTPATIENT)
Dept: HEALTH INFORMATION MANAGEMENT | Facility: CLINIC | Age: 79
End: 2024-12-10
Payer: COMMERCIAL

## 2024-12-10 ENCOUNTER — NURSE TRIAGE (OUTPATIENT)
Dept: FAMILY MEDICINE | Facility: CLINIC | Age: 79
End: 2024-12-10
Payer: COMMERCIAL

## 2024-12-10 NOTE — TELEPHONE ENCOUNTER
Called pt to get more information on foot pain.     Pt states that back in 1995 she dropped 150 pounds on her right foot. Pt had multiple fractures, especially around her big toe. Pt states that she had surgery to fix all this which left her with reduced plantar flexion and shortened ligaments. Pt states that she has been fine up to a couple months ago when she started to notice the pain radiating to her other toes and tightness. Pt stated she has a bad callus on the right side of her rt big toe and a callus on the left side of her rt second toe that rub on each other. Pt states that this can cause her pain. Pt states that she had a chiropractor appt recently and they noticed a white dot on the big toe callus. Pt was not aware of this. Pt states that her foot must also be swollen as she cannot wear normal shoes  (can only wear wide sneakers with a pad). Pt states that when she wears the pad and wide sneakers the pain is a 5/10 but when she wears shoes without the pad pain is 9/10. Pt states that she has to hobble/limp when walking in order for her foot to not hurt. Pt had to miss multiple events due to not being able to wear certain shoes.   Denies fever, break in skin, infection symptoms, chest pain , and SOB.    Per protocol (see in office today or tomorrow) pt was scheduled an OV on 12/11 to discuss further. Pt was advised to seek help from the ED if the pain becomes unmanageable or any new or worsening symptoms arise before appt tomorrow. Pt understands and agrees with discussed plan.      Reason for Disposition   Limp when walking    Additional Information   Negative: Serious injury with multiple fractures (broken bones)   Negative: Major bleeding (e.g., actively dripping or spurting) and can't be stopped   Negative: Amputation   Negative: Looks like a dislocated joint (very crooked or deformed)   Negative: Sounds like a life-threatening emergency to the triager   Negative: Ankle injury is main concern    "Negative: Wound looks infected (e.g., spreading redness, pus)   Negative: Caused by an animal bite   Negative: Caused by a human bite   Negative: Puncture wound of foot   Negative: Toe injury is main concern   Negative: Cast problems or questions   Negative: Splint or elastic bandage problems or questions   Negative: Bullet wound, stabbed by knife, or other serious penetrating wound   Negative: Can't stand (bear weight) or walk   Negative: Skin is split open or gaping (or length > 1/2 inch or 12 mm)   Negative: Bleeding and won't stop after 10 minutes of direct pressure (using correct technique)   Negative: Dirt in the wound and not removed with 15 minutes of scrubbing   Negative: New numbness (loss of sensation) of foot or toe(s) and present now   Negative: Sounds like a serious injury to the triager   Negative: SEVERE pain and not improved 2 hours after pain medicine/ice packs   Negative: Large swelling or bruise (> 2 inches or 5 cm)   Negative: No prior tetanus shots (or is not fully vaccinated) and any wound (e.g., cut, scrape)   Negative: HIV positive or severe immunodeficiency (severely weak immune system) and DIRTY cut    Answer Assessment - Initial Assessment Questions  1. MECHANISM: \"How did the injury happen?\" (e.g., twisting injury, direct blow)       Dropped 150 pounds on foot in 1995  2. ONSET: \"When did the injury happen?\" (e.g., minutes or hours ago)       1995  3. LOCATION: \"Where is the injury located?\"       Right foot, Right big toe   4. APPEARANCE of INJURY: \"What does the injury look like?\"       Two calluses (right side of big toe and left side of second toe)   5. WEIGHT-BEARING: \"Can you put weight on that foot?\" \"Can you walk (four steps or more)?\"        Depends, if pad is in, can walk ok. Can't go to dress events. Can only wear sneakers  6. SIZE: For cuts, bruises, or swelling, ask: \"How large is it?\" (e.g., inches or centimeters;  entire joint)       N/A  7. PAIN: \"Is there pain?\" If Yes, " "ask: \"How bad is the pain?\" \"What does it keep you from doing?\" (Scale 0-10; or none, mild, moderate, severe)      Depends, when wearing wide toed sneakers (5/10), wear anything else (9/10)  8. TETANUS: For any breaks in the skin, ask: \"When was the last tetanus booster?\"      N/A  9. OTHER SYMPTOMS: \"Do you have any other symptoms?\"       swelling  10. PREGNANCY: \"Is there any chance you are pregnant?\" \"When was your last menstrual period?\"        N/A    Protocols used: Foot Injury-A-[place]    "

## 2024-12-10 NOTE — TELEPHONE ENCOUNTER
Reason for Call:  Appointment Request    Patient requesting this type of appt: sore foot from injury in 1995 and now she can't wear most of her shoes    Requested provider: Ami Aguilar Ra    Reason patient unable to be scheduled: Not within requested timeframe    When does patient want to be seen/preferred time:  asap    Comments: none    Could we send this information to you in [place] or would you prefer to receive a phone call?:   Patient would prefer a phone call   Okay to leave a detailed message?: Yes at Home number on file [phone] (home)    Call taken on 12/10/2024 at 9:14 AM by Shandra Guerra

## 2024-12-10 NOTE — TELEPHONE ENCOUNTER
Routing to nurses to triage when patient needs to be seen.     Nathalia Figueroa  Lead   [facility]

## 2024-12-11 ENCOUNTER — OFFICE VISIT (OUTPATIENT)
Dept: FAMILY MEDICINE | Facility: CLINIC | Age: 79
End: 2024-12-11
Payer: COMMERCIAL

## 2024-12-11 VITALS
DIASTOLIC BLOOD PRESSURE: 70 MMHG | SYSTOLIC BLOOD PRESSURE: 115 MMHG | BODY MASS INDEX: 22.16 KG/M2 | HEART RATE: 71 BPM | TEMPERATURE: 98.4 F | RESPIRATION RATE: 16 BRPM | OXYGEN SATURATION: 98 % | HEIGHT: 65 IN | WEIGHT: 133 LBS

## 2024-12-11 DIAGNOSIS — M79.674 PAIN OF TOE OF RIGHT FOOT: ICD-10-CM

## 2024-12-11 DIAGNOSIS — K58.2 IRRITABLE BOWEL SYNDROME WITH CONSTIPATION AND DIARRHEA: ICD-10-CM

## 2024-12-11 DIAGNOSIS — M62.89 PELVIC FLOOR DYSFUNCTION: ICD-10-CM

## 2024-12-11 DIAGNOSIS — G62.9 NEUROPATHY: ICD-10-CM

## 2024-12-11 DIAGNOSIS — N19 RENAL FAILURE, UNSPECIFIED CHRONICITY: ICD-10-CM

## 2024-12-11 DIAGNOSIS — R19.4 ALTERED BOWEL HABITS: Primary | ICD-10-CM

## 2024-12-11 DIAGNOSIS — R19.4 ALTERED BOWEL HABITS: ICD-10-CM

## 2024-12-11 DIAGNOSIS — R14.0 ABDOMINAL BLOATING: ICD-10-CM

## 2024-12-11 DIAGNOSIS — L84 CORN OR CALLUS: Primary | ICD-10-CM

## 2024-12-11 DIAGNOSIS — N18.31 STAGE 3A CHRONIC KIDNEY DISEASE (H): ICD-10-CM

## 2024-12-11 LAB
CRP SERPL-MCNC: <3 MG/L
ERYTHROCYTE [SEDIMENTATION RATE] IN BLOOD BY WESTERGREN METHOD: 6 MM/HR (ref 0–30)
PREALB SERPL-MCNC: 16.9 MG/DL (ref 20–40)

## 2024-12-11 PROCEDURE — G2211 COMPLEX E/M VISIT ADD ON: HCPCS | Performed by: PHYSICIAN ASSISTANT

## 2024-12-11 PROCEDURE — 99214 OFFICE O/P EST MOD 30 MIN: CPT | Performed by: PHYSICIAN ASSISTANT

## 2024-12-11 ASSESSMENT — PAIN SCALES - GENERAL: PAINLEVEL_OUTOF10: NO PAIN (0)

## 2024-12-11 NOTE — PROGRESS NOTES
"  Assessment & Plan     Corn or callus  Pain of toe of right foot  Discussed management options and recommend trying urea first instead of salicylic acid treatments due to peripheral neuropathy. Also recommend corn pads. Follow-up with podiatry if no improvement or if worsening.  - Orthopedic  Referral; Future    Neuropathy  Peripheral neuropathy in feet, chronic. Caution with corn treatment due to neuropathy.    Irritable bowel syndrome with constipation and diarrhea  Per specialist  - Prealbumin  - Selenium  - Zinc    Stage 3a chronic kidney disease (H)  Per specialist  - Prealbumin  - Selenium  - Zinc    Pelvic floor dysfunction  Per specialist  - Prealbumin  - Selenium  - Zinc    Abdominal bloating  Per specialist  - Prealbumin  - Selenium  - Zinc    Renal failure  Per specialist  - Prealbumin  - Selenium  - Zinc    Altered bowel habits  Per specialist  - CRP inflammation  - Calprotectin Feces  - Erythrocyte sedimentation rate auto    The longitudinal plan of care for the diagnosis(es)/condition(s) as documented were addressed during this visit. Due to the added complexity in care, I will continue to support Shaina in the subsequent management and with ongoing continuity of care.    35 minutes spent on the date of the encounter doing chart review, history and exam, documentation and further activities per the note      Subjective   Shaina is a 79 year old, presenting for the following health issues:  Foot Pain        12/11/2024     1:41 PM   Additional Questions   Roomed by chester verde   Accompanied by self         12/11/2024     1:41 PM   Patient Reported Additional Medications   Patient reports taking the following new medications na     HPI     Per nurse triage note from yesterday, 12/10/24:   \"Pt states that back in 1995 she dropped 150 pounds on her right foot. Pt had multiple fractures, especially around her big toe. Pt states that she had surgery to fix all this which left her with reduced plantar " "flexion and shortened ligaments. Pt states that she has been fine up to a couple months ago when she started to notice the pain radiating to her other toes and tightness. Pt stated she has a bad callus on the right side of her rt big toe and a callus on the left side of her rt second toe that rub on each other. Pt states that this can cause her pain. Pt states that she had a chiropractor appt recently and they noticed a white dot on the big toe callus. Pt was not aware of this. Pt states that her foot must also be swollen as she cannot wear normal shoes  (can only wear wide sneakers with a pad). Pt states that when she wears the pad and wide sneakers the pain is a 5/10 but when she wears shoes without the pad pain is 9/10. Pt states that she has to hobble/limp when walking in order for her foot to not hurt. Pt had to miss multiple events due to not being able to wear certain shoes.   Denies fever, break in skin, infection symptoms, chest pain , and SOB.\"    No swelling  Pain with certain shoes that are tighter in toe area because it pushes toes together more        Objective    /70   Pulse 71   Temp 98.4  F (36.9  C) (Oral)   Resp 16   Ht 1.651 m (5' 5\")   Wt 60.3 kg (133 lb)   LMP  (LMP Unknown)   SpO2 98%   BMI 22.13 kg/m    Body mass index is 22.13 kg/m .  Physical Exam   GENERAL: alert and no distress  RESP: lungs clear to auscultation - no rales, rhonchi or wheezes  CV: regular rate and rhythm, normal S1 S2, no S3 or S4, no murmur, click or rub, no peripheral edema  MS: no gross musculoskeletal defects noted, no edema  SKIN: right foot without swelling, warmth, tenderness, redness. Right great toe deviates laterally slightly and pushes into 2nd toe. There are corns on the lateral surface of right great toe and medial surface or right 2nd toe.  PSYCH: mentation appears normal, affect normal/bright          Signed Electronically by: Daniella Poe PA-C    "

## 2024-12-12 ENCOUNTER — THERAPY VISIT (OUTPATIENT)
Dept: PHYSICAL THERAPY | Facility: CLINIC | Age: 79
End: 2024-12-12
Payer: COMMERCIAL

## 2024-12-12 ENCOUNTER — PATIENT OUTREACH (OUTPATIENT)
Dept: CARE COORDINATION | Facility: CLINIC | Age: 79
End: 2024-12-12

## 2024-12-12 DIAGNOSIS — M62.89 PELVIC FLOOR DYSFUNCTION IN FEMALE: Primary | ICD-10-CM

## 2024-12-16 ENCOUNTER — LAB (OUTPATIENT)
Dept: LAB | Facility: CLINIC | Age: 79
End: 2024-12-16
Payer: COMMERCIAL

## 2024-12-16 ENCOUNTER — TELEPHONE (OUTPATIENT)
Dept: FAMILY MEDICINE | Facility: CLINIC | Age: 79
End: 2024-12-16

## 2024-12-16 ENCOUNTER — MYC MEDICAL ADVICE (OUTPATIENT)
Dept: FAMILY MEDICINE | Facility: CLINIC | Age: 79
End: 2024-12-16

## 2024-12-16 DIAGNOSIS — N19 RENAL FAILURE: ICD-10-CM

## 2024-12-16 DIAGNOSIS — M62.89 PELVIC FLOOR DYSFUNCTION: ICD-10-CM

## 2024-12-16 DIAGNOSIS — R14.0 ABDOMINAL BLOATING: ICD-10-CM

## 2024-12-16 DIAGNOSIS — N18.31 STAGE 3A CHRONIC KIDNEY DISEASE (H): ICD-10-CM

## 2024-12-16 DIAGNOSIS — K58.2 IRRITABLE BOWEL SYNDROME WITH CONSTIPATION AND DIARRHEA: ICD-10-CM

## 2024-12-16 PROCEDURE — 84255 ASSAY OF SELENIUM: CPT | Mod: 90

## 2024-12-16 PROCEDURE — 99000 SPECIMEN HANDLING OFFICE-LAB: CPT

## 2024-12-16 PROCEDURE — 84630 ASSAY OF ZINC: CPT | Mod: 90

## 2024-12-16 NOTE — TELEPHONE ENCOUNTER
Patient calling to ask for test results of the [employer] breath test she did through [facility]. This test checks for SIBO.    Ami Aguilar signed the order form in October. Patient completed the test and sent it in on 12/1/24.     [employer] states they sent results to the ordering provider on 12/4/24. No results are scanned into the chart.     Patient will ask results to be faxed again and is requesting message be sent to provider in case she received results directly.     Sherri Webber RN 12/16/2024 3:40 PM  [facility]

## 2024-12-19 ENCOUNTER — TRANSFERRED RECORDS (OUTPATIENT)
Dept: HEALTH INFORMATION MANAGEMENT | Facility: CLINIC | Age: 79
End: 2024-12-19

## 2024-12-19 LAB
SELENIUM SERPL-MCNC: 122.7 UG/L
ZINC SERPL-MCNC: 68.8 UG/DL

## 2024-12-30 NOTE — TELEPHONE ENCOUNTER
[employer] message sent with results to patient.     Sherri Webber RN 12/30/2024 3:42 PM  [facility]

## 2025-01-13 ENCOUNTER — THERAPY VISIT (OUTPATIENT)
Dept: PHYSICAL THERAPY | Facility: CLINIC | Age: 80
End: 2025-01-13
Payer: COMMERCIAL

## 2025-01-13 DIAGNOSIS — M62.89 PELVIC FLOOR DYSFUNCTION IN FEMALE: Primary | ICD-10-CM

## 2025-01-13 PROCEDURE — 97140 MANUAL THERAPY 1/> REGIONS: CPT | Mod: GP | Performed by: PHYSICAL THERAPIST

## 2025-01-13 PROCEDURE — 20561 NDL INSJ W/O NJX 3+ MUSC: CPT | Mod: GZ | Performed by: PHYSICAL THERAPIST

## 2025-02-03 ENCOUNTER — TRANSFERRED RECORDS (OUTPATIENT)
Dept: HEALTH INFORMATION MANAGEMENT | Facility: CLINIC | Age: 80
End: 2025-02-03
Payer: COMMERCIAL

## 2025-02-06 ENCOUNTER — LAB (OUTPATIENT)
Dept: LAB | Facility: CLINIC | Age: 80
End: 2025-02-06
Payer: COMMERCIAL

## 2025-02-06 DIAGNOSIS — I65.23 CAROTID ARTERY PLAQUE, BILATERAL: ICD-10-CM

## 2025-02-06 DIAGNOSIS — N18.31 STAGE 3A CHRONIC KIDNEY DISEASE (H): ICD-10-CM

## 2025-02-06 LAB
ANION GAP SERPL CALCULATED.3IONS-SCNC: 11 MMOL/L (ref 7–15)
BUN SERPL-MCNC: 25.6 MG/DL (ref 8–23)
CALCIUM SERPL-MCNC: 9.2 MG/DL (ref 8.8–10.4)
CHLORIDE SERPL-SCNC: 102 MMOL/L (ref 98–107)
CHOLEST SERPL-MCNC: 197 MG/DL
CREAT SERPL-MCNC: 0.99 MG/DL (ref 0.51–0.95)
EGFRCR SERPLBLD CKD-EPI 2021: 58 ML/MIN/1.73M2
FASTING STATUS PATIENT QL REPORTED: YES
FASTING STATUS PATIENT QL REPORTED: YES
GLUCOSE SERPL-MCNC: 78 MG/DL (ref 70–99)
HCO3 SERPL-SCNC: 26 MMOL/L (ref 22–29)
HDLC SERPL-MCNC: 93 MG/DL
LDLC SERPL CALC-MCNC: 95 MG/DL
NONHDLC SERPL-MCNC: 104 MG/DL
POTASSIUM SERPL-SCNC: 4 MMOL/L (ref 3.4–5.3)
SODIUM SERPL-SCNC: 139 MMOL/L (ref 135–145)
TRIGL SERPL-MCNC: 43 MG/DL

## 2025-02-13 ENCOUNTER — THERAPY VISIT (OUTPATIENT)
Dept: PHYSICAL THERAPY | Facility: CLINIC | Age: 80
End: 2025-02-13
Payer: COMMERCIAL

## 2025-02-13 DIAGNOSIS — M62.89 PELVIC FLOOR DYSFUNCTION IN FEMALE: Primary | ICD-10-CM

## 2025-02-14 NOTE — PROGRESS NOTES
02/13/25 0500   Appointment Info   Signing clinician's name / credentials Desiree Paul, MPT, PRPC/Shu Lozano, SPT   Total/Authorized Visits EPIC 04/09/24   Visits Used 25   Medical Diagnosis Pelvic floor dysfunction in female (M62.89)   PT Tx Diagnosis incomplete emptying bowels, frequency   Other pertinent information hx seton/anal fistulas/abcesses   Progress Note/Certification   Start of Care Date 05/28/24   Onset of illness/injury or Date of Surgery 04/09/24  (order date)   Therapy Frequency 1x/wk   Predicted Duration 12 weeks   Certification date from 02/07/25   Certification date to 05/08/25   Progress Note Completed Date 11/14/24   PT Goal 1   Goal Identifier Report complete bowel evacuation 1-2 xday   Rationale to maximize safety and independence with performance of ADLs and functional tasks;to maximize safety and independence within the home;to maximize safety and independence within the community;to maximize safety and independence with transportation;to maximize safety and independence with self cares   Goal Progress Pt reports she's been having a bowel movement 2-3 times/day which is significantly less than previous however has continued to manually evacuate every time.   Target Date 05/08/25   Subjective Report   Subjective Report Pt has not been seen in a month but has had a lot going on. Is scheduled with Dr Mac at [place] next week. She also has an appt with PFPT at [place]. Had MRI which revealed fistula is healing a little bit with no signs of current abcess. MD report noted pelvic floor tension was better but still somewhat tight. Also, recommended she stop digital evacuation. Pt notes that's very difficult for her yet. Feels like the stool gets stuck in the rectum and notes that she is pushing and breathing as hard as she comfortably can. If she can put her finger in and move it around, it will then come out. Once the intial harder stool comes out, she gets sticky stool. Has upped her  fiber intake. Consistency of texture is still a concern. Having a bowel movement 2-3 times/day now. Manually disimpacting all of the bowel movements. Will have a bowel movement before she goes to bed but doesn't always have an urge. If she doesn't go, she will be up at 3 AM and then has to do her whole bowel routine and it keeps her awake.   Objective Measure 1   Objective Measure Palpation   Details Internal vaginal muscle assessment revealed significant tension with good contraction, delayed relaxation and poor lengthening still.   PT Modalities   PT Modalities Dry Needling   Dry Needling   Dry Needling, Insertion 3 or more muscles (73265) 4   Number of tissues 1;2;3;4   Tissue - 1 L piriformis   Needle Thickness - 1 .30   Needle Depth - 1 60 mm   Needle Count - 1 1   Position - 1 Sidelying   Technique - 1 with stim to thumping with alternating frequency   Tissue - 2 L glut med   Needle Thickness - 2 .30   Needle Depth - 2 50 mm   Needle Count - 2 1   Position - 2 Sidelying   Technique - 2 with stim to thumping with alternating frequency   Tissue - 3 R STP   Needle Thickness - 3 .25   Needle Depth - 3 40 mm   Needle Count - 3 1   Position - 3 Sidelying   Technique - 3 with stim to thumping with alternating frequency   Tissue - 4 R puborectalis   Needle Thickness - 4 .30   Needle Depth - 4 50 mm   Needle Count - 4 1   Position - 4 Sidelying   Technique - 4 with stim to thumping with alternating frequency   Adverse reaction comments No   Patient Response/Progress Tolerated well today.   Therapeutic Activity   Therapeutic Activities: dynamic activities to improve functional performance minutes (65973) 15   Ther Act 1 Discussed importance in not manually evacuating. Discussed importance in not straining again. Also, discussed the implications of tightness in the pelvic floor on the ability to empty. Also, discussed that hemorrhoids could also be present making things feel like she's not completely empty. talked about  "the different urges and suggested that she wait for a 2-3 urge to have a bowel movement. To avoid straining just to \"try to have a bowel movement on command\". Pt also notes she has to go through this process as she can't have a bowel movement in public or she wouldn't be able to manually evacuate. Discussed again that she should avoid doing this. Is concerned about constipation. Discussed this with patient.   Ther Act 1 - Details Reminded patient to complete bowel massage.   Ther Act 2 During manual treatment, patient was focused on breathing however breathing was very forced and actually increased pelvic floor tension. Discussed with patient that the breathing should be relaxed and not forced and discussed the increase in tension.   Skilled Intervention Pt education   Patient Response/Progress Pt does understand however also made a comment that she's not sure she's completely ready to improve as she needs to work on her gut microbiome as well. Discussed the implications of both of these areas feeding off each other.   Manual Therapy   Manual Therapy: Mobilization, MFR, MLD, friction massage minutes (85772) 10   Manual Therapy 1 Internal vaginal muscle release   Manual Therapy 1 - Details manual trigger point release and sweeping.  Much tighter today than at last visit. Contract, relax, lengthen with focus on lengthening and added manual stretch with lengthening.   Skilled Intervention Manual therapy   Patient Response/Progress Tolerated well however continues to have significant tightness.   Plan   Home program Continue with HEP   Updates to plan of care Continue 1x/wk wit dry needling and manual therapy. Pt does have some behaviors that are likely creating a barrier to improvement. Have discussed these behaviors and habits with patient extensively. Encouraged patient again to work on not manually evacuating.   Plan for next session Continue with manual and dry needling.   Total Session Time   Timed Code Treatment " Minutes 25   Total Treatment Time (sum of timed and untimed services) 29       [facility]                                                                                   OUTPATIENT PHYSICAL THERAPY    PLAN OF TREATMENT FOR OUTPATIENT REHABILITATION   Patient's Last Name, First Name, Shaina Talamantes YOB: 1945   Provider's Name   [facility]   Medical Record No.  [id number]     Onset Date: 04/09/24 (order date)  Start of Care Date: 05/28/24     Medical Diagnosis:  Pelvic floor dysfunction in female (M62.89)      PT Treatment Diagnosis:  incomplete emptying bowels, frequency Plan of Treatment  Frequency/Duration: 1x/wk/ 12 weeks    Certification date from (P) 02/07/25 to (P) 05/08/25         See note for plan of treatment details and functional goals     Desiree Paul, PT                         I CERTIFY THE NEED FOR THESE SERVICES FURNISHED UNDER        THIS PLAN OF TREATMENT AND WHILE UNDER MY CARE     (Physician attestation of this document indicates review and certification of the therapy plan).              Referring Provider:  Ami Aguilar    Initial Assessment  See Epic Evaluation- Start of Care Date: 05/28/24            PLAN  Continue therapy per current plan of care.    Beginning/End Dates of Progress Note Reporting Period:  11/14/24 to 02/13/2025    Referring Provider:  Ami Aguilar

## 2025-02-17 ENCOUNTER — THERAPY VISIT (OUTPATIENT)
Dept: PHYSICAL THERAPY | Facility: CLINIC | Age: 80
End: 2025-02-17
Payer: COMMERCIAL

## 2025-02-17 DIAGNOSIS — M62.89 PELVIC FLOOR DYSFUNCTION IN FEMALE: Primary | ICD-10-CM

## 2025-02-17 PROCEDURE — 20561 NDL INSJ W/O NJX 3+ MUSC: CPT | Mod: GA | Performed by: PHYSICAL THERAPIST

## 2025-02-17 PROCEDURE — 97530 THERAPEUTIC ACTIVITIES: CPT | Mod: GP | Performed by: PHYSICAL THERAPIST

## 2025-02-25 ENCOUNTER — TELEPHONE (OUTPATIENT)
Dept: DERMATOLOGY | Facility: CLINIC | Age: 80
End: 2025-02-25

## 2025-02-25 ENCOUNTER — THERAPY VISIT (OUTPATIENT)
Dept: PHYSICAL THERAPY | Facility: CLINIC | Age: 80
End: 2025-02-25
Payer: COMMERCIAL

## 2025-02-25 DIAGNOSIS — M62.89 PELVIC FLOOR DYSFUNCTION IN FEMALE: Primary | ICD-10-CM

## 2025-02-25 PROCEDURE — 97140 MANUAL THERAPY 1/> REGIONS: CPT | Mod: GP | Performed by: PHYSICAL THERAPIST

## 2025-02-25 PROCEDURE — 20561 NDL INSJ W/O NJX 3+ MUSC: CPT | Mod: GA | Performed by: PHYSICAL THERAPIST

## 2025-02-25 NOTE — TELEPHONE ENCOUNTER
2/25 Patient confirmed scheduled appointment:  Date: 6/24/2025  Time: 10:40 am  Visit type: Return Dermatology  Provider: Samantha  Location: [facility]  Testing/imaging: n/a  Additional notes: n/a

## 2025-03-05 ENCOUNTER — THERAPY VISIT (OUTPATIENT)
Dept: PHYSICAL THERAPY | Facility: CLINIC | Age: 80
End: 2025-03-05
Payer: COMMERCIAL

## 2025-03-05 DIAGNOSIS — M62.89 PELVIC FLOOR DYSFUNCTION IN FEMALE: Primary | ICD-10-CM

## 2025-03-05 PROCEDURE — 97140 MANUAL THERAPY 1/> REGIONS: CPT | Mod: GP | Performed by: PHYSICAL THERAPIST

## 2025-03-05 PROCEDURE — 97530 THERAPEUTIC ACTIVITIES: CPT | Mod: GP | Performed by: PHYSICAL THERAPIST

## 2025-03-11 ENCOUNTER — THERAPY VISIT (OUTPATIENT)
Dept: PHYSICAL THERAPY | Facility: CLINIC | Age: 80
End: 2025-03-11
Payer: COMMERCIAL

## 2025-03-11 DIAGNOSIS — M62.89 PELVIC FLOOR DYSFUNCTION IN FEMALE: Primary | ICD-10-CM

## 2025-03-11 PROCEDURE — 97530 THERAPEUTIC ACTIVITIES: CPT | Mod: GP | Performed by: PHYSICAL THERAPIST

## 2025-03-11 PROCEDURE — 97140 MANUAL THERAPY 1/> REGIONS: CPT | Mod: GP | Performed by: PHYSICAL THERAPIST

## 2025-03-13 ENCOUNTER — TRANSFERRED RECORDS (OUTPATIENT)
Dept: HEALTH INFORMATION MANAGEMENT | Facility: CLINIC | Age: 80
End: 2025-03-13
Payer: COMMERCIAL

## 2025-03-17 ENCOUNTER — THERAPY VISIT (OUTPATIENT)
Dept: PHYSICAL THERAPY | Facility: CLINIC | Age: 80
End: 2025-03-17
Payer: COMMERCIAL

## 2025-03-17 DIAGNOSIS — M62.89 PELVIC FLOOR DYSFUNCTION IN FEMALE: Primary | ICD-10-CM

## 2025-03-17 PROCEDURE — 97140 MANUAL THERAPY 1/> REGIONS: CPT | Mod: GP | Performed by: PHYSICAL THERAPIST

## 2025-03-17 PROCEDURE — 20561 NDL INSJ W/O NJX 3+ MUSC: CPT | Mod: GZ | Performed by: PHYSICAL THERAPIST

## 2025-03-17 PROCEDURE — 97530 THERAPEUTIC ACTIVITIES: CPT | Mod: GP | Performed by: PHYSICAL THERAPIST

## 2025-03-24 ENCOUNTER — THERAPY VISIT (OUTPATIENT)
Dept: PHYSICAL THERAPY | Facility: CLINIC | Age: 80
End: 2025-03-24
Payer: COMMERCIAL

## 2025-03-24 DIAGNOSIS — M62.89 PELVIC FLOOR DYSFUNCTION IN FEMALE: Primary | ICD-10-CM

## 2025-03-24 PROCEDURE — 97530 THERAPEUTIC ACTIVITIES: CPT | Mod: GP | Performed by: PHYSICAL THERAPIST

## 2025-03-24 NOTE — PROGRESS NOTES
03/24/25 0500   Appointment Info   Signing clinician's name / credentials Desiree Paul, MPT, PRPC/Shu Lozano, SPT   Total/Authorized Visits EPIC 04/09/24   Visits Used 27   Medical Diagnosis Pelvic floor dysfunction in female (M62.89)   PT Tx Diagnosis incomplete emptying bowels, frequency   Precautions/Limitations verbal and written OK'd (see scanned 03/06/25) for rectal pelvic floor mm work   Other pertinent information hx seton/anal fistulas/abcesses   Progress Note/Certification   Start of Care Date 05/28/24   Onset of illness/injury or Date of Surgery 04/09/24  (order date)   Therapy Frequency 1x/wk   Predicted Duration 12 weeks   Certification date from 02/07/25   Certification date to 05/08/25   Progress Note Completed Date 02/13/25   PT Goal 1   Goal Identifier Report complete bowel evacuation 1-2 xday   Rationale to maximize safety and independence with performance of ADLs and functional tasks;to maximize safety and independence within the home;to maximize safety and independence within the community;to maximize safety and independence with transportation;to maximize safety and independence with self cares   Goal Progress Has been waiting for urge to 2/3 and notes she's only going 1-3x/day now and feels like she's having better bowel movements but not always feeling empty yet. Still manually disimpacting. Pt notes she can't stop.   Target Date 05/08/25   Subjective Report   Subjective Report Pt reports she's haing conflict. Understands she needs to break the habit of manually evacuate. Muscles, microbiome, etc is erratic and complicates her ability to go. Has a CBT therapy eval tomorrow. Hasn't been able to not manually disimpact.   Therapeutic Activity   Therapeutic Activities: dynamic activities to improve functional performance minutes (29423) 35   Ther Act 1 Had a very long converstaion with Shaina today throughout her entire appt discussing her POC. Discussed hyperviligence around bowel  movements, mental health side of this in relationship to physical sx's. Discussed benefit and cons of multiple people being involved. Pt reports she has reached out to other PTs, medical providers and practitioners to get their opinions. Discussed that while 2nd opinions are beneficial, reaching out to too many people and getting a lot of differening opinions may make things even more challenging for her. Discussed benefit of comprehensive management between different disciplines is very beneficial in complex and chronic cases but again even that may be overwhelming for some. Discussed the benefit on decreasing PT right now and focusing on the down training through breathwork and CBT therapy to help with some of the hyperviligence and then returning to PT once she's in a better place iwth those things. Discussed some of the breathing recommendations from previous and not forcing the breath as well. Pt appreciated that this was all brought up and talked about but notes it's a lot to think about and would like to consider options. Disucssed right now decreasing appts to every 2-3 weeks for PT to allow her to focus more on the CBT and mental health side of things but to continue with PT to assist with PF dysfunction as well. Also, discussed trial DC of dry needling and focusing more on internal manual work and NMR of pelvic floor now that we are cleared by MD for internal work. Overall, pt is in agreement with plan but will think about it over the next couple days and following CBT therapy tomorrow and then will decide.   Skilled Intervention Pt education and POC going forward   Plan   Home program Continue with current HEP   Updates to plan of care Decrease to 1x every 2 weeks   Plan for next session DC DN and continue with internal manual and exercise progression as tolerated.   Total Session Time   Timed Code Treatment Minutes 35   Total Treatment Time (sum of timed and untimed services) 35         PLAN  Decrease to  1x every 2 weeks to allow focus on CBT therapy.     Beginning/End Dates of Progress Note Reporting Period:  02/13/25 to 03/24/2025    Referring Provider:  Ami Aguilar

## 2025-04-07 ENCOUNTER — THERAPY VISIT (OUTPATIENT)
Dept: PHYSICAL THERAPY | Facility: CLINIC | Age: 80
End: 2025-04-07
Payer: COMMERCIAL

## 2025-04-07 DIAGNOSIS — M62.89 PELVIC FLOOR DYSFUNCTION IN FEMALE: Primary | ICD-10-CM

## 2025-04-07 PROCEDURE — 97140 MANUAL THERAPY 1/> REGIONS: CPT | Mod: GP | Performed by: PHYSICAL THERAPIST

## 2025-04-07 PROCEDURE — 97530 THERAPEUTIC ACTIVITIES: CPT | Mod: GP | Performed by: PHYSICAL THERAPIST

## 2025-04-14 ENCOUNTER — THERAPY VISIT (OUTPATIENT)
Dept: PHYSICAL THERAPY | Facility: CLINIC | Age: 80
End: 2025-04-14
Payer: COMMERCIAL

## 2025-04-14 DIAGNOSIS — M62.89 PELVIC FLOOR DYSFUNCTION IN FEMALE: Primary | ICD-10-CM

## 2025-04-14 PROCEDURE — 97530 THERAPEUTIC ACTIVITIES: CPT | Mod: GP | Performed by: PHYSICAL THERAPIST

## 2025-04-14 PROCEDURE — 97140 MANUAL THERAPY 1/> REGIONS: CPT | Mod: GP | Performed by: PHYSICAL THERAPIST

## 2025-04-21 ENCOUNTER — THERAPY VISIT (OUTPATIENT)
Dept: PHYSICAL THERAPY | Facility: CLINIC | Age: 80
End: 2025-04-21
Payer: COMMERCIAL

## 2025-04-21 DIAGNOSIS — M62.89 PELVIC FLOOR DYSFUNCTION IN FEMALE: Primary | ICD-10-CM

## 2025-04-21 PROCEDURE — 97530 THERAPEUTIC ACTIVITIES: CPT | Mod: GP | Performed by: PHYSICAL THERAPIST

## 2025-05-02 ENCOUNTER — TRANSFERRED RECORDS (OUTPATIENT)
Dept: HEALTH INFORMATION MANAGEMENT | Facility: CLINIC | Age: 80
End: 2025-05-02

## 2025-05-06 ENCOUNTER — TELEPHONE (OUTPATIENT)
Dept: DERMATOLOGY | Facility: CLINIC | Age: 80
End: 2025-05-06

## 2025-05-06 NOTE — TELEPHONE ENCOUNTER
Called pt to offer appointment with Dr. Singh if she is still interested in seeing him. Follow up was intended to be around 7/2025. Was going to offer TESFAYE on 8/19 at 7:55am. PAOs also available end of May if pt would like to be seen sooner than 1 year from previous skin check.    JAIDEN Vidales

## 2025-05-06 NOTE — TELEPHONE ENCOUNTER
----- Message from Anne Marie WILSON sent at 5/6/2025  8:53 AM CDT -----  Regarding: Turnaround check-in  Hi all,    Is there any way to have this patient seen with Dr. Singh prior to Jan/Feb 2026? This is the second time she's been rescheduled (though we were able to find an earlier appt the last time we rescheduled).    Originally Made On: 7/3/2024 8:35 AM    Rescheduled From: 7/1/2025 10:25 AM      Rescheduled:             2/25/2025 11:10 AM    Scheduled on:             6/24/25    Next available: 1/27/25 7:40 AM       It puts her about 6 months beyond where she was originally trying to schedule, so she has decided to cancel the appointment, unless we can find something. I advised her to sent [employer] message directly to you via [employer] and sent her the list of partner clinics.     Thanks for anything you can do to help!  [facility]

## 2025-05-22 ENCOUNTER — THERAPY VISIT (OUTPATIENT)
Dept: PHYSICAL THERAPY | Facility: CLINIC | Age: 80
End: 2025-05-22
Payer: COMMERCIAL

## 2025-05-22 DIAGNOSIS — M62.89 PELVIC FLOOR DYSFUNCTION IN FEMALE: Primary | ICD-10-CM

## 2025-05-22 NOTE — PROGRESS NOTES
05/22/25 0500   Appointment Info   Signing clinician's name / credentials Desiree Paul, MPT, PRPC   Total/Authorized Visits EPIC 04/09/24   Visits Used 31   Medical Diagnosis Pelvic floor dysfunction in female (M62.89)   PT Tx Diagnosis incomplete emptying bowels, frequency   Precautions/Limitations verbal and written OK'd (see scanned 03/06/25) for rectal pelvic floor mm work   Other pertinent information hx seton/anal fistulas/abcesses   Progress Note/Certification   Start of Care Date 05/28/24   Onset of illness/injury or Date of Surgery 04/09/24  (order date)   Therapy Frequency 2x/month   Predicted Duration 12 weeks   Certification date from 05/09/25   Certification date to 08/01/25   Progress Note Completed Date 03/24/25   PT Goal 1   Goal Identifier Report complete bowel evacuation 1-2 xday   Rationale to maximize safety and independence with performance of ADLs and functional tasks;to maximize safety and independence within the home;to maximize safety and independence within the community;to maximize safety and independence with transportation;to maximize safety and independence with self cares   Goal Progress Was actually doing better in that she was able to have almost a complete bowel movement with good relaxation of her PF until she went into the hospital recently. Feels like gut motility and consistency is now all over the place again. Date extended   Target Date 08/14/25   Subjective Report   Subjective Report Pt returns after being out of town. When she was out of town, She was supposed to be going to meet her friends and didn't show up so friends called her and she had transient global amnesia and was driven to the hospital. Was admitted for observation overnight. Did have MRI which no infarction or anything. Since then, did drive her car home and friend drove home with her. Since then, has been feeling somewhat fuzzy. Is wondering if this is a huge energetic shift. Did get an appt with PCP  "for next week. And also wants her to follow up with neuro and is scheduled for June with them. In terms of bowels, is scheduled for the high resolution anoscopy on June 19th. In terms of her sx's, her muscles are working much better and there are times she can just sit down on the toilet, the muscles relax and she is able to empty easily. Was still \"checking\"to make sure she was empty. Bowel consistency since hospitalization has been up and down. Consistency of stool has been up and down and also manual disimpaction has been worse. Notes that she feels it's the only thing she has control over and needs to be in this routine. Has been working on the exercises but has not been doing the wand at all anymore. Exercises are going well.   Objective Measure 1   Objective Measure Observation   Details Pt has slight red raised area at opening of fistula. With palpation around the area, it is firm and there's a larger firmness underneath the sruface. Did have some brownish discharge from the area as well.   Objective Measure 2   Objective Measure PF   Details With cue to contract, relax and bear down, pt did have appropriate contraction and relaxation but decreased overall bearing down.   Therapeutic Activity   Therapeutic Activities: dynamic activities to improve functional performance minutes (55896) 35   Ther Act 1 Talked through POC at this point. Discussed continued routine of self evacuation. Pt has stopped CBT because it was expensive and didn't think it was helping her but has continued to work with Michelle her other therapist. Discussed that overall, does appear to have improved functioning of the pelvic floor but continues to manually evacuate. She has had a flare up of sx's because of her hospitalization and change in diet so overall sx's are a little worse. Recommend that she get back on her normal routine and see how her sx's are. I suspect from a PF stand point, that she is not perfect but doing OK overall and " "things are improved however, from a \"habit\" standpoint, that is likely more of her limitation at this point. I would like her to hold on the wand given the look of the fistula and follow up with MD. I did take pictures of the area as well as the borders with use of patient's phone to be able to connect with her MD about.   Plan   Home program Continue with current HEP. Continue with external self manual release. hold on internalContinue with focus on positives   Updates to plan of care FU with MD regarding redness and firmness in area of fistula.   Plan for next session Pt to try things for a little bit and normalize gut. Will FU as needed.   Total Session Time   Timed Code Treatment Minutes 35   Total Treatment Time (sum of timed and untimed services) 35         [facility]                                                                                   OUTPATIENT PHYSICAL THERAPY    PLAN OF TREATMENT FOR OUTPATIENT REHABILITATION   Patient's Last Name, First Name, EDDADIANA  DurgaShaina  TASHIA YOB: 1945   Provider's Name   [facility]   Medical Record No.  [id number]     Onset Date: 04/09/24 (order date)  Start of Care Date: 05/28/24     Medical Diagnosis:  Pelvic floor dysfunction in female (M62.89)      PT Treatment Diagnosis:  incomplete emptying bowels, frequency Plan of Treatment  Frequency/Duration: 2x/month/ 12 weeks    Certification date from 05/09/25 to 08/01/25         See note for plan of treatment details and functional goals     Desiree Paul, PT                         I CERTIFY THE NEED FOR THESE SERVICES FURNISHED UNDER        THIS PLAN OF TREATMENT AND WHILE UNDER MY CARE     (Physician attestation of this document indicates review and certification of the therapy plan).              Referring Provider:  Ami Aguilar    Initial Assessment  See Epic Evaluation- Start of Care Date: 05/28/24            PLAN  Continue 2x/month " for 12 weeks.     Beginning/End Dates of Progress Note Reporting Period:  03/24/25 to 05/22/2025    Referring Provider:  Ami Aguilar

## 2025-05-29 ENCOUNTER — LAB REQUISITION (OUTPATIENT)
Dept: LAB | Facility: CLINIC | Age: 80
End: 2025-05-29
Payer: COMMERCIAL

## 2025-05-29 DIAGNOSIS — Z00.00 ENCOUNTER FOR GENERAL ADULT MEDICAL EXAMINATION WITHOUT ABNORMAL FINDINGS: ICD-10-CM

## 2025-05-29 PROCEDURE — 88112 CYTOPATH CELL ENHANCE TECH: CPT | Mod: TC,ORL | Performed by: COLON & RECTAL SURGERY

## 2025-05-29 PROCEDURE — 88112 CYTOPATH CELL ENHANCE TECH: CPT | Mod: 26 | Performed by: PATHOLOGY

## 2025-05-30 ENCOUNTER — OFFICE VISIT (OUTPATIENT)
Dept: FAMILY MEDICINE | Facility: CLINIC | Age: 80
End: 2025-05-30
Payer: COMMERCIAL

## 2025-05-30 VITALS
OXYGEN SATURATION: 99 % | HEART RATE: 60 BPM | DIASTOLIC BLOOD PRESSURE: 84 MMHG | WEIGHT: 135.7 LBS | SYSTOLIC BLOOD PRESSURE: 144 MMHG | HEIGHT: 64 IN | RESPIRATION RATE: 14 BRPM | TEMPERATURE: 97.5 F | BODY MASS INDEX: 23.17 KG/M2

## 2025-05-30 DIAGNOSIS — R63.8 OTHER SYMPTOMS AND SIGNS CONCERNING FOOD AND FLUID INTAKE: ICD-10-CM

## 2025-05-30 DIAGNOSIS — K58.2 IRRITABLE BOWEL SYNDROME WITH CONSTIPATION AND DIARRHEA: ICD-10-CM

## 2025-05-30 DIAGNOSIS — E55.9 VITAMIN D DEFICIENCY, UNSPECIFIED: ICD-10-CM

## 2025-05-30 DIAGNOSIS — N18.31 STAGE 3A CHRONIC KIDNEY DISEASE (H): Primary | ICD-10-CM

## 2025-05-30 DIAGNOSIS — Z23 NEED FOR VACCINATION: ICD-10-CM

## 2025-05-30 DIAGNOSIS — G45.4 TRANSIENT GLOBAL AMNESIA: ICD-10-CM

## 2025-05-30 LAB
CREAT UR-MCNC: 8.1 MG/DL
FERRITIN SERPL-MCNC: 68 NG/ML (ref 11–328)
IRON BINDING CAPACITY (ROCHE): 321 UG/DL (ref 240–430)
IRON SATN MFR SERPL: 27 % (ref 15–46)
IRON SERPL-MCNC: 88 UG/DL (ref 37–145)
MICROALBUMIN UR-MCNC: <12 MG/L
MICROALBUMIN/CREAT UR: NORMAL MG/G{CREAT}
VIT B12 SERPL-MCNC: 1754 PG/ML (ref 232–1245)
VIT D+METAB SERPL-MCNC: 60 NG/ML (ref 20–50)

## 2025-05-30 PROCEDURE — 36415 COLL VENOUS BLD VENIPUNCTURE: CPT | Performed by: NURSE PRACTITIONER

## 2025-05-30 PROCEDURE — 83540 ASSAY OF IRON: CPT | Performed by: NURSE PRACTITIONER

## 2025-05-30 PROCEDURE — 82043 UR ALBUMIN QUANTITATIVE: CPT | Performed by: NURSE PRACTITIONER

## 2025-05-30 PROCEDURE — 99000 SPECIMEN HANDLING OFFICE-LAB: CPT | Performed by: NURSE PRACTITIONER

## 2025-05-30 PROCEDURE — 82570 ASSAY OF URINE CREATININE: CPT | Performed by: NURSE PRACTITIONER

## 2025-05-30 PROCEDURE — 84207 ASSAY OF VITAMIN B-6: CPT | Mod: 90 | Performed by: NURSE PRACTITIONER

## 2025-05-30 PROCEDURE — 83550 IRON BINDING TEST: CPT | Performed by: NURSE PRACTITIONER

## 2025-05-30 PROCEDURE — 82306 VITAMIN D 25 HYDROXY: CPT | Performed by: NURSE PRACTITIONER

## 2025-05-30 PROCEDURE — 82607 VITAMIN B-12: CPT | Performed by: NURSE PRACTITIONER

## 2025-05-30 PROCEDURE — 82728 ASSAY OF FERRITIN: CPT | Performed by: NURSE PRACTITIONER

## 2025-05-30 ASSESSMENT — PAIN SCALES - GENERAL: PAINLEVEL_OUTOF10: NO PAIN (0)

## 2025-05-30 NOTE — PROGRESS NOTES
"  {PROVIDER CHARTING PREFERENCE:546391}    Subjective   Shaina is a 79 year old, presenting for the following health issues:  Hospital F/U      5/30/2025     8:18 AM   Additional Questions   Roomed by Rhael WILSON MA   Accompanied by self         5/30/2025     8:18 AM   Patient Reported Additional Medications   Patient reports taking the following new medications none     HPI      {MA/LPN/RN Pre-Provider Visit Orders- hCG/UA/Strep (Optional):077130}    Hospital Follow-up Visit:    Hospital/Nursing Home/IP Rehab Facility: [facility]   Date of Admission: 5/18/2025  Date of Discharge: 5/19/2025  Reason(s) for Admission: transient global amnesia   Do you have any other stressors you would like to discuss with your provider? No    Problems taking medications regularly:  None  Medication changes since discharge: None  Problems adhering to non-medication therapy:  None    Summary of hospitalization:  {:433812}  Diagnostic Tests/Treatments reviewed.  Follow up needed: {:720433:}  Other Healthcare Providers Involved in Patient s Care:         {those currently involved after discharge:359710::\"None\"}  Update since discharge: {:711987} {TIP  Include information from family/caregivers, SNF, Care Coordination :744519}        Plan of care communicated with {:525339}     {Reference  Coding guidelines- Moderate Complexity F2F/Video within 7 - 14 days of discharge 4046217, High Complexity F2F/Video within 7 days 7963925 or [id number] days 1406535 :501775}      {additonal problems for provider to add (Optional):779473}  {additonal problems for provider to add (Optional):918443}    {ROS Picklists (Optional):704845}      Objective    BP (!) 144/84 (BP Location: Right arm, Patient Position: Sitting, Cuff Size: Adult Regular)   Pulse 60   Temp 97.5  F (36.4  C) (Oral)   Resp 14   Ht 1.626 m (5' 4\")   Wt 61.6 kg (135 lb 11.2 oz)   LMP  (LMP Unknown)   SpO2 99%   BMI 23.29 kg/m    Body mass index is 23.29 kg/m .  Physical " Exam   {Exam List (Optional):571375}    {Diagnostic Test Results (Optional):533789}        Signed Electronically by: JULES Hickey CNP  {Email feedback regarding this note to [email or web address]   :085133}

## 2025-06-02 LAB
PATH REPORT.COMMENTS IMP SPEC: NORMAL
PATH REPORT.FINAL DX SPEC: NORMAL
PATH REPORT.GROSS SPEC: NORMAL
PATH REPORT.MICROSCOPIC SPEC OTHER STN: NORMAL

## 2025-06-03 ENCOUNTER — THERAPY VISIT (OUTPATIENT)
Dept: PHYSICAL THERAPY | Facility: CLINIC | Age: 80
End: 2025-06-03
Payer: COMMERCIAL

## 2025-06-03 DIAGNOSIS — M62.89 PELVIC FLOOR DYSFUNCTION IN FEMALE: Primary | ICD-10-CM

## 2025-06-03 LAB — PYRIDOXAL PHOS SERPL-SCNC: 394.5 NMOL/L

## 2025-06-03 PROCEDURE — 97530 THERAPEUTIC ACTIVITIES: CPT | Mod: GP | Performed by: PHYSICAL THERAPIST

## 2025-06-03 PROCEDURE — 97112 NEUROMUSCULAR REEDUCATION: CPT | Mod: GP | Performed by: PHYSICAL THERAPIST

## 2025-06-04 ENCOUNTER — TRANSFERRED RECORDS (OUTPATIENT)
Dept: HEALTH INFORMATION MANAGEMENT | Facility: CLINIC | Age: 80
End: 2025-06-04
Payer: COMMERCIAL

## 2025-06-04 ENCOUNTER — RESULTS FOLLOW-UP (OUTPATIENT)
Dept: SURGERY | Facility: CLINIC | Age: 80
End: 2025-06-04

## 2025-06-09 ENCOUNTER — VIRTUAL VISIT (OUTPATIENT)
Dept: PEDIATRICS | Facility: CLINIC | Age: 80
End: 2025-06-09
Payer: COMMERCIAL

## 2025-06-09 ENCOUNTER — MYC MEDICAL ADVICE (OUTPATIENT)
Dept: FAMILY MEDICINE | Facility: CLINIC | Age: 80
End: 2025-06-09
Payer: COMMERCIAL

## 2025-06-09 ENCOUNTER — TELEPHONE (OUTPATIENT)
Dept: FAMILY MEDICINE | Facility: CLINIC | Age: 80
End: 2025-06-09

## 2025-06-09 DIAGNOSIS — Z20.7 EXPOSURE TO PARASITIC DISEASE: Primary | ICD-10-CM

## 2025-06-09 PROCEDURE — 98012 SYNCH AUDIO-ONLY EST SF 10: CPT | Performed by: NURSE PRACTITIONER

## 2025-06-09 NOTE — PROGRESS NOTES
Shaina is a 79 year old who is being evaluated via a billable telephone visit.      Originating Location (pt. Location): Home    Distant Location (provider location):  On-site  Telephone visit completed due to the patient did not have access to video, while the distant provider did.    Assessment & Plan     Exposure to parasitic disease  Asymptomatic. Requesting referral to be seen at [facility].   - Adult Infectious Disease  Referral; Future              Subjective   Shaina is a 79 year old, presenting for the following health issues:  Symptoms    HPI      Presents to discuss exposure to tapeworm. She has a long complicated history of digestive problems including IBS, SIBO, pelvic floor dysfunction, positive anal pap. She does not cook so has a friend who has cooked for her every month for the past 3-4 years. This friend recently admitted to the patient that she had a tapeworm. She pulled the tapeworm out herself. The patient has not noticed any abnormalities in her stool. She has concerns about her exposure and risk and has contacted the nurse line at [facility]. Is recommended to get a referral to be seen there, which is what motivated her to schedule the appt today.     Fax: [phone]      Objective       Vitals:  No vitals were obtained today due to virtual visit.    Physical Exam   General: Alert and no distress //Respiratory: No audible wheeze, cough, or shortness of breath // Psychiatric:  Appropriate affect, tone, and pace of words          Phone call duration: 10 minutes  Signed Electronically by: JULES Qureshi CNP

## 2025-06-09 NOTE — TELEPHONE ENCOUNTER
Called the pt.     She has had someone cooking for her once a month for 3-4 years.  After cooking for the pt this week, the person who was cooking for her said she has a tapeworm.  The pt said she has severe multiple digestive issues.  She needs to know what she needs to do to not get infected.  She is afraid she could have ingested a tapeworm in the food that the worker prepared.  She is not showing symptoms.      Advised the pt will need an appt.  She really wants to go to infectious diseases at [facility].  She needs a referral.      The worker is going to [facility].      Talked to Ami Aguilar.  She advised the pt could be seen here.    The pt is very anxious to get an appt.  Appt scheduled for a phone visit.  Advised then orders could be placed if needed.  She still may want a referral to [facility]  Advised she can discuss this at her appt.  Appt is less than 30 minutes away.      Appointments in Next Year      Jun 09, 2025 1:00 PM  Provider Visit with JULES Qureshi CNP  [facility] ([facility] ) [phone]     Jun 30, 2025 10:40 AM  Pelvic Health Treatment with Desiree Paul PT  [facility] ([facility] ) [phone]

## 2025-06-09 NOTE — Clinical Note
Hi - could you please fax the infectious disease referral to Fax: [phone] ([facility]). Thank you!
MODERATE

## 2025-06-09 NOTE — TELEPHONE ENCOUNTER
Order/Referral Request    Who is requesting: patient    Orders being requested: Infection Disise[facility] to [facility]    Reason service is needed/diagnosis: May have been exposed to tape worm through an infected person who prepares her food and has history of digestive issues     When are orders needed by: ASAP    Has this been discussed with Provider: NO     Does patient have a preference on a Group/Provider/Facility? [facility]    Does patient have an appointment scheduled?: No    Where to send orders: Fax  [phone] [facility]    Could we send this information to you in [employer] or would you prefer to receive a phone call?:   Patient would prefer a phone call   Okay to leave a detailed message?: Yes at Home number on file [phone] (home)

## 2025-06-18 ENCOUNTER — RESULTS FOLLOW-UP (OUTPATIENT)
Dept: FAMILY MEDICINE | Facility: CLINIC | Age: 80
End: 2025-06-18

## 2025-06-30 ENCOUNTER — THERAPY VISIT (OUTPATIENT)
Dept: PHYSICAL THERAPY | Facility: CLINIC | Age: 80
End: 2025-06-30
Payer: COMMERCIAL

## 2025-06-30 DIAGNOSIS — M62.89 PELVIC FLOOR DYSFUNCTION IN FEMALE: Primary | ICD-10-CM

## 2025-06-30 PROCEDURE — 97530 THERAPEUTIC ACTIVITIES: CPT | Mod: GP | Performed by: PHYSICAL THERAPIST

## 2025-06-30 PROCEDURE — 97112 NEUROMUSCULAR REEDUCATION: CPT | Mod: GP | Performed by: PHYSICAL THERAPIST

## 2025-06-30 NOTE — PROGRESS NOTES
06/30/25 0500   Appointment Info   Signing clinician's name / credentials Desiree Paul, MPT, PRPC   Total/Authorized Visits EPIC 04/09/24   Visits Used 33   Medical Diagnosis Pelvic floor dysfunction in female (M62.89)   PT Tx Diagnosis incomplete emptying bowels, frequency   Precautions/Limitations verbal and written OK'd (see scanned 03/06/25) for rectal pelvic floor mm work   Other pertinent information hx seton/anal fistulas/abcesses   Progress Note/Certification   Start of Care Date 05/28/24   Onset of illness/injury or Date of Surgery 04/09/24  (order date)   Therapy Frequency 2x/month   Predicted Duration 12 weeks   Certification date from 05/09/25   Certification date to 08/01/25   Progress Note Completed Date 05/22/25   PT Goal 1   Goal Identifier Report complete bowel evacuation 1-2 xday   Rationale to maximize safety and independence with performance of ADLs and functional tasks;to maximize safety and independence within the home;to maximize safety and independence within the community;to maximize safety and independence with transportation;to maximize safety and independence with self cares   Goal Progress Is down to 2-3 bowel movements/day however is still manually evacuating most of the bowel movements but much less than she ever has before.   Target Date 08/14/25   Subjective Report   Subjective Report Pt returns after not being seen for almost a month. Did see ID MD and was told she couldn't get it from her friend as it has to be from an infected host which means infected meat. Did do some stool testing but not related to that but it's all normal and fine. In terms of the HRA, talked to [place] and told them she would prefer not to do the HRA right now if she didn't have to. Because her current HPV test was negative and anal pap that was postiitve and then negative. Also, told her the fistula is healing and pelvic floor is starting to function better. Microbiome is healing as she's only having  2-3 bowel movements/day. Requested 2 additional anal paps before going through with this now which she agreed. Cancelled HRA for now and is still trying to decide what to do. Did try to eat some seeds and nuts recently which didn't go well at all. Fistula is still draining and the opening is still open and small. No abcess or sign of abcess. Feels like she's in a good status quo place right now. Did sign up for a 12 week neuroynamic program. Goal today is one more PT session Recently has had maybe a couple times/week where she has a bowel movement and things just come out eaily without straining and doesn't have to manually evacuate. Does feel she's at a point she's ready to try things on her own and feels she's at a point she's able to manage for the most part and is on a good path forward.   Objective Measure 1   Objective Measure Observation   Details Slight drainage noted at area of fistula today. Does have external raised bump at opening of fistula. No redness noted and pt notes it's been like this since last seeing MD.   Objective Measure 2   Objective Measure PF   Details Internal palpation reveals overall good resting tone. Able to contract and relax pelvic floor without difficulty. Does cotninue to have slight difficulty with cue to lengthen. Upon continued cuing, patient did have improved ability to lengthen and movement was more appropriate.   Therapeutic Activity   Therapeutic Activities: dynamic activities to improve functional performance minutes (90630) 20   Ther Act 1 Talked through full HEP, what to continue with, what to focus on and discussed proper PF momvement again. Discussed good contract, relax but gave analogies about bearing down and opening through EAS to avoid need to manually evacuate.   Ther Act 1 - Details Again, spent several minutes discussing POC with patient and talking through HEP going forward as well as continued managament and goals.   Skilled Intervention Pt education    Patient Response/Progress Pt to continue with current HEP.   Neuromuscular Re-education   Neuromuscular re-ed of mvmt, balance, coord, kinesthetic sense, posture, proprioception minutes (12643) 10   Neuro Re-ed 1 PF ROM with internal manual cuing.   Neuro Re-ed 1 - Details Focused on proper contract, relax, bulge. Pt did not have significant tension, just had poor ability to go through motion and lack of coordination. No manual releasing done, focused on proper coordination and form.   Skilled Intervention Motor control and coordination   Patient Response/Progress Continues to struggle with consistency of lengthening.   Plan   Home program Continue with current HEP   Updates to plan of care DC to I HEP at this point. Pt to follow up as needed.   Total Session Time   Timed Code Treatment Minutes 30   Total Treatment Time (sum of timed and untimed services) 30       DISCHARGE  Reason for Discharge: Pt to be DC'd to HEP. Pt is at a place where she has been making consistent progress on her own and is I with HEP. Will follow up with PT as needed.     Equipment Issued:     Discharge Plan: Patient to continue home program.    Referring Provider:  Ami Aguilar

## 2025-07-10 ENCOUNTER — TELEPHONE (OUTPATIENT)
Dept: FAMILY MEDICINE | Facility: CLINIC | Age: 80
End: 2025-07-10
Payer: COMMERCIAL

## 2025-07-10 NOTE — TELEPHONE ENCOUNTER
Patient calling and states she has a question.  Patient is wondering if we do anal pap smears in clinic.  She states she is being followed by [facility] and had a positive anal pap there and had one with [facility] and was negative.  Patient states she wants to have another 3rd anal pap smear and wondering if she can do here.  Advised I have not seen or heard of anal pap being done here.  She will follow-up with [facility] to discuss 3rd test.  Magaly Law RN

## 2025-07-13 PROBLEM — M62.89 PELVIC FLOOR DYSFUNCTION IN FEMALE: Status: RESOLVED | Noted: 2022-08-15 | Resolved: 2025-07-13

## 2025-08-13 DIAGNOSIS — I34.0 MITRAL INCOMPETENCE: ICD-10-CM

## 2025-08-13 DIAGNOSIS — N18.31 STAGE 3A CHRONIC KIDNEY DISEASE (H): Primary | ICD-10-CM

## 2025-08-13 DIAGNOSIS — I07.1 RHEUMATIC TRICUSPID VALVE REGURGITATION: ICD-10-CM

## 2025-08-19 ENCOUNTER — LAB (OUTPATIENT)
Dept: LAB | Facility: CLINIC | Age: 80
End: 2025-08-19
Payer: COMMERCIAL

## 2025-08-19 DIAGNOSIS — I34.0 MITRAL INCOMPETENCE: ICD-10-CM

## 2025-08-19 DIAGNOSIS — I07.1 RHEUMATIC TRICUSPID VALVE REGURGITATION: ICD-10-CM

## 2025-08-19 DIAGNOSIS — N18.31 STAGE 3A CHRONIC KIDNEY DISEASE (H): ICD-10-CM

## 2025-08-19 LAB
ANION GAP SERPL CALCULATED.3IONS-SCNC: 11 MMOL/L (ref 7–15)
BUN SERPL-MCNC: 31.9 MG/DL (ref 8–23)
CALCIUM SERPL-MCNC: 9.2 MG/DL (ref 8.8–10.4)
CHLORIDE SERPL-SCNC: 99 MMOL/L (ref 98–107)
CREAT SERPL-MCNC: 0.97 MG/DL (ref 0.51–0.95)
EGFRCR SERPLBLD CKD-EPI 2021: 60 ML/MIN/1.73M2
GLUCOSE SERPL-MCNC: 61 MG/DL (ref 70–99)
HCO3 SERPL-SCNC: 26 MMOL/L (ref 22–29)
HGB BLD-MCNC: 15.3 G/DL (ref 11.7–15.7)
MCV RBC AUTO: 99.6 FL (ref 78–100)
NT-PROBNP SERPL-MCNC: 1135 PG/ML (ref 0–624)
POTASSIUM SERPL-SCNC: 4.2 MMOL/L (ref 3.4–5.3)
SODIUM SERPL-SCNC: 136 MMOL/L (ref 135–145)

## 2025-08-19 PROCEDURE — 80048 BASIC METABOLIC PNL TOTAL CA: CPT

## 2025-08-19 PROCEDURE — 36415 COLL VENOUS BLD VENIPUNCTURE: CPT

## 2025-08-19 PROCEDURE — 83880 ASSAY OF NATRIURETIC PEPTIDE: CPT | Mod: GA

## 2025-08-19 PROCEDURE — 85018 HEMOGLOBIN: CPT

## 2025-08-28 ENCOUNTER — TRANSFERRED RECORDS (OUTPATIENT)
Dept: HEALTH INFORMATION MANAGEMENT | Facility: CLINIC | Age: 80
End: 2025-08-28
Payer: COMMERCIAL

## (undated) DEVICE — PREP POVIDONE IODINE SOLUTION 10% 4OZ BOTTLE 29906-004

## (undated) DEVICE — SUCTION CANISTER MEDIVAC LINER 3000ML W/LID 65651-530

## (undated) DEVICE — LUBRICATING JELLY 4.25OZ

## (undated) DEVICE — ESU ELEC NDL 1" COATED/INSULATED E1465

## (undated) DEVICE — LINEN FULL SHEET 5511

## (undated) DEVICE — SYR 10ML LL W/O NDL 302995

## (undated) DEVICE — LINEN HALF SHEET 5512

## (undated) DEVICE — DRAPE LAP PEDS DISP 29492

## (undated) DEVICE — NDL 18GA 1.5" 305196

## (undated) DEVICE — SU VICRYL 3-0 SH 27" UND J416H

## (undated) DEVICE — DRSG KERLIX FLUFFS X5

## (undated) DEVICE — GLOVE BIOGEL PI MICRO SZ 7.0 48570

## (undated) DEVICE — SUCTION MANIFOLD NEPTUNE 2 SYS 4 PORT 0702-020-000

## (undated) DEVICE — PANTIES MESH LG/XLG 2PK 706M2

## (undated) DEVICE — TUBING SUCTION 12"X1/4" N612

## (undated) DEVICE — LINEN TOWEL PACK X10 5473

## (undated) DEVICE — NDL 25GA 1.5" 305127

## (undated) DEVICE — SYR 05ML LL W/O NDL

## (undated) DEVICE — SOL NACL 0.9% IRRIG 1000ML BOTTLE 2F7124

## (undated) DEVICE — SUCTION TIP YANKAUER W/O VENT K86

## (undated) DEVICE — ESU GROUND PAD ADULT W/CORD E7507

## (undated) DEVICE — SYR 30ML LL W/O NDL 302832

## (undated) DEVICE — TRAY PREP DRY SKIN SCRUB 067

## (undated) DEVICE — SU SILK 2-0 TIE 24" SA75H

## (undated) DEVICE — TAPE DURAPORE 3" SILK 1538-3

## (undated) DEVICE — PACK MINOR CUSTOM RIDGES SBA32RMRMA

## (undated) DEVICE — GLOVE PROTEXIS W/NEU-THERA 7.0  2D73TE70

## (undated) DEVICE — BAG CLEAR TRASH 1.3M 39X33" P4040C

## (undated) DEVICE — SYR BULB IRRIG 50ML LATEX FREE 0035280

## (undated) DEVICE — TAPE CLOTH ADHESIVE 3" LATEX FREE

## (undated) DEVICE — NDL ANGIOCATH 14GA 1.25" 3068

## (undated) DEVICE — SYR 10ML FINGER CONTROL W/O NDL 309695

## (undated) DEVICE — VESSEL LOOPS RED MAXI

## (undated) DEVICE — PREP SKIN SCRUB TRAY 4461A

## (undated) DEVICE — DECANTER VIAL 2006S

## (undated) RX ORDER — LIDOCAINE HYDROCHLORIDE 10 MG/ML
INJECTION, SOLUTION EPIDURAL; INFILTRATION; INTRACAUDAL; PERINEURAL
Status: DISPENSED
Start: 2024-05-01

## (undated) RX ORDER — ACETAMINOPHEN 325 MG/1
TABLET ORAL
Status: DISPENSED
Start: 2024-05-01

## (undated) RX ORDER — LIDOCAINE HYDROCHLORIDE 10 MG/ML
INJECTION, SOLUTION EPIDURAL; INFILTRATION; INTRACAUDAL; PERINEURAL
Status: DISPENSED
Start: 2022-03-15

## (undated) RX ORDER — ONDANSETRON 2 MG/ML
INJECTION INTRAMUSCULAR; INTRAVENOUS
Status: DISPENSED
Start: 2022-03-15

## (undated) RX ORDER — BUPIVACAINE HYDROCHLORIDE AND EPINEPHRINE 2.5; 5 MG/ML; UG/ML
INJECTION, SOLUTION EPIDURAL; INFILTRATION; INTRACAUDAL; PERINEURAL
Status: DISPENSED
Start: 2022-03-15

## (undated) RX ORDER — GLYCOPYRROLATE 0.2 MG/ML
INJECTION INTRAMUSCULAR; INTRAVENOUS
Status: DISPENSED
Start: 2022-03-15

## (undated) RX ORDER — FENTANYL CITRATE 50 UG/ML
INJECTION, SOLUTION INTRAMUSCULAR; INTRAVENOUS
Status: DISPENSED
Start: 2024-05-01

## (undated) RX ORDER — FENTANYL CITRATE 50 UG/ML
INJECTION, SOLUTION INTRAMUSCULAR; INTRAVENOUS
Status: DISPENSED
Start: 2022-03-15

## (undated) RX ORDER — ACETAMINOPHEN 325 MG/1
TABLET ORAL
Status: DISPENSED
Start: 2022-03-15

## (undated) RX ORDER — BUPIVACAINE HYDROCHLORIDE 2.5 MG/ML
INJECTION, SOLUTION EPIDURAL; INFILTRATION; INTRACAUDAL
Status: DISPENSED
Start: 2024-05-01

## (undated) RX ORDER — PROPOFOL 10 MG/ML
INJECTION, EMULSION INTRAVENOUS
Status: DISPENSED
Start: 2022-03-15